# Patient Record
Sex: FEMALE | Race: WHITE | NOT HISPANIC OR LATINO | Employment: OTHER | URBAN - METROPOLITAN AREA
[De-identification: names, ages, dates, MRNs, and addresses within clinical notes are randomized per-mention and may not be internally consistent; named-entity substitution may affect disease eponyms.]

---

## 2017-11-01 ENCOUNTER — ALLSCRIPTS OFFICE VISIT (OUTPATIENT)
Dept: OTHER | Facility: OTHER | Age: 82
End: 2017-11-01

## 2018-01-09 NOTE — PROGRESS NOTES
Chief Complaint  Patient is here today to drop off urine sample per H P , L Mcpherson/LPN      Active Problems    1  Current use of beta blocker (V58 69) (Z79 899)   2  Encounter for lipid screening for cardiovascular disease (V77 91,V81 2) (Z13 220,Z13 6)   3  Hypertension (401 9) (I10)   4  Need for immunization against influenza (V04 81) (Z23)   5  Need for Streptococcus pneumoniae vaccination (V03 82) (Z23)   6  Screening for thyroid disorder (V77 0) (Z13 29)    Current Meds   1  Hydrochlorothiazide 12 5 MG Oral Capsule; TAKE 1 CAPSULE DAILY; Therapy: 26ZIS6691 to (Evaluate:11Nov2016)  Requested for: 24SZX0110; Last   Rx:17Nov2015 Ordered   2  Metoprolol Succinate ER 50 MG Oral Tablet Extended Release 24 Hour; TAKE 1 TABLET   ONCE DAILY; Therapy: 90GXW3749 to (Evaluate:11Nov2016)  Requested for: 07NQD6455; Last   Rx:17Nov2015 Ordered    Allergies    1  Penicillins   2   Sulfa Drugs    Results/Data  Urine Dip Automated- POC 59HWU4525 11:22AM Aziza Sheth     Test Name Result Flag Reference   Color Yellow     Clarity Transparent     Leukocytes negative     Nitrite negative     Blood negative     Bilirubin negative     Urobilinogen normal     Protein negative     Ph 5     Specific Gravity 1 020     Ketone negative     Glucose normal     Color Yellow     Clarity Transparent     Leukocytes negative     Nitrite negative     Blood negative     Bilirubin negative     Urobilinogen normal     Protein negative     Ph 5     Specific Gravity 1 020     Ketone negative     Glucose normal               Plan  Health Maintenance    · Urine Dip Automated- POC; Status:Resulted - Requires Verification,Retrospective By  Protocol Authorization;   Done: 56ZSG4125 11:22AM    Future Appointments    Date/Time Provider Specialty Site   06/06/2016 08:30 AM [facility]     Signatures   Electronically signed by : Remedios Gutierrez MD; May 20 2016 11:36AM EST (Author)

## 2018-01-10 NOTE — PROGRESS NOTES
Assessment   1  Encounter for lipid screening for cardiovascular disease (V77 91,V81 2) (Z13 220,Z13 6)  2  Encounter for preventive health examination (V70 0) (Z00 00)  3  Current use of beta blocker (V58 69) (Z79 899)  4  Hypertension (401 9) (I10)  5  Screening for thyroid disorder (V77 0) (Z13 29)  6  Need for Streptococcus pneumoniae vaccination (V03 82) (Z23)    Plan  Current use of beta blocker, Hypertension, Need for immunization against influenza    · (1) CBC/PLT/DIFF; Status:Active; Requested for:61Osq7563;    · (1) LIPID PANEL, FASTING; Status:Active; Requested for:50Pah0282;    · (1) TSH; Status:Active; Requested for:78Vfo0032;   Current use of beta blocker, Need for immunization against influenza    · (1) VITAMIN D 25-HYDROXY; Status:Active; Requested for:05Ejd8165; Health Maintenance    · (1) URINALYSIS w URINE C/S REFLEX (will reflex a microscopy if leukocytes, occult  blood, or nitrites are not within normal limits); Status:Active; Requested for:23Mvo3582;   Need for immunization against influenza    · (1) COMPREHENSIVE METABOLIC PANEL; Status:Active; Requested for:41Tgk0161;    · Administered: Prevnar 13 Intramuscular Suspension    Discussion/Summary    Healthy diet and exercise  Patient is active and social  Independent in [place]  No recent falls  Monitor BP 3X weekly  Target 140/80  If trending higher, will need additional medication or change  Adequate sleep and rest   Limit salt in diet  Adequate fluids daily  Has had follow up with Dr Luis Alfredo Marie for past hx  breast ca/lumpectomy Mammo in 2015  Has seen Dr Turner Grier, GI who advised no longer need for colonoscopy  No change in bowel or bladder  Is over 80  Impression: Subsequent Annual Wellness Visit  Cardiovascular screening and counseling: the risks and benefits of screening were discussed, screening is current, counseling was given on maintaining a healthy diet and counseling was given on maintaining a healthy weight     Diabetes screening and counseling: the risks and benefits of screening were discussed, counseling was given on maintaining a healthy diet and patient is active and social    Colorectal cancer screening and counseling: the risks and benefits of screening were discussed, screening not indicated and GI advises colon not indicated related to age  Breast cancer screening and counseling: the risks and benefits of screening were discussed and screening is current  Cervical cancer screening and counseling: the risks and benefits of screening were discussed and screening is current  Osteoporosis screening and counseling: the risks and benefits of screening were discussed, screening is current, counseling was given on obtaining adequate amounts of calcium and vitamin D on a daily basis and counseling was given on the importance of regular weightbearing exercise  Abdominal aortic aneurysm screening and counseling: the risks and benefits of screening were discussed and screening not indicated  Glaucoma screening and counseling: the risks and benefits of screening were discussed and screening is current  HIV screening and counseling: screening not indicated  Chief Complaint  Patient is here today for an Annual Wellness Exam  lb/lpn      History of Present Illness  HPI: feeling well  No health concerns  Is active, social and happy  No recent falls  Dances every morning to keep in shape  No hair loss, skin changes, heat or cold intolerance  No dizziness or balance issues  No chest pain, syncope, sob, palpitations, skipped beats  No abdominal or urinary complaints  No change in bowel or bladder  Daily bms  Had colonoscopy 10 years ago but has checked with GI who told patient, no longer needs them  No dark or blood stools  No stress incontinence  Occas  mild joint pain that resolves with movement  +living will  Grab bars in bathroom   Welcome to [employer] and Wellness Visits:  The patient is being seen for the subsequent annual wellness visit  Medicare Screening and Risk Factors   Medicare Screening Tests Risk Questions   Abdominal aortic aneurysm risk assessment: none indicated  Osteoporosis risk assessment: , female gender, over 48years of age, past medical history of fracture(s) and arm  HIV risk assessment: none indicated  Drug and Alcohol Use: The patient has never smoked cigarettes  The patient reports never drinking alcohol  Diet and Physical Activity: Current diet includes well balanced meals  She exercises daily  Exercise: walking, dancing 20 minutes daily minutes per day  Advance Directives: Advance directives: living will  end of life decisions were reviewed with the patient  Co-Managers and Medical Equipment/Suppliers: See Patient Care Team   Reviewed Updated [facility]:   Last Medicare Wellness Visit Information was reviewed, patient interviewed and updates made to the record today  Preventive Quality Program 65 and Older: Falls Risk: The patient fell 0 times in the past 12 months  The fall resulted from falling on stairs and a few years ago, person knocked into patient at top of steps causing fall  The fall resulted in fracture(s) of arm  Associated symptoms:  no pain from the injury  The patient currently has no urinary incontinence symptoms  Date of last glaucoma screen was 2015      Review of Systems    Constitutional: negative  Head and Face: negative  Eyes: negative  ENT: negative  Cardiovascular: negative  Respiratory: negative  Gastrointestinal: negative  Genitourinary: negative  Musculoskeletal: negative  Integumentary and Breasts: mole on upper left chest and back  Neurological: negative  Psychiatric: negative  Endocrine: negative  Hematologic and Lymphatic: negative  Active Problems   1  Current use of beta blocker (V58 69) (Z79 899)  2  Encounter for lipid screening for cardiovascular disease (V77 91,V81 2) (Z13 220,Z13 6)  3   Hypertension (401 9) (I10)  4  Need for immunization against influenza (V04 81) (Z23)  5  Screening for thyroid disorder (V77 0) (Z13 29)    Past Medical History    · History of essential hypertension (V12 59) (Z86 79)   · History of osteoporosis (V13 59) (Z87 39)   · History of Malignant neoplasm of right breast (174 9) (C50 911)    Surgical History    · History of Bladder Surgery   · History of Breast Surgery Lumpectomy    Family History  Mother    · Family history of cerebrovascular accident (V17 1) (Z82 3)   · Family history of hypertension (V17 49) (Z82 49)   · Family history of Ovarian cancer   · Family history of Pancreas carcinoma  Father    · No pertinent family history  Brother    · Family history of Pancreas carcinoma  Maternal Relatives    · Family history of Bipolar depression    The family history was reviewed and updated today  Social History    · Alcohol use (V49 89) (Z78 9)   · wine on occasion   · Caffeine use (V49 89) (F15 90)   · Dental care, regularly   · Drinks coffee   · Never smoked   · Occasional alcohol use   · Tea    Current Meds  1  Hydrochlorothiazide 12 5 MG Oral Capsule; TAKE 1 CAPSULE DAILY; Therapy: 53IZI5272 to (Evaluate:11Nov2016)  Requested for: 44LKT9154; Last   Rx:17Nov2015 Ordered  2  Metoprolol Succinate ER 50 MG Oral Tablet Extended Release 24 Hour; TAKE 1 TABLET   ONCE DAILY; Therapy: 67EYG7520 to (Evaluate:11Nov2016)  Requested for: 95VII7014; Last   Rx:17Nov2015 Ordered    Allergies   1  Penicillins  2   Sulfa Drugs    Immunizations   1 2 3    Influenza  [id number] 94Juq7312 09Zib2252     Vitals  Signs [Data Includes: Current Encounter]    Temperature: 97 5 F, Tympanic  Heart Rate: 76, L Radial  Pulse Quality: Irregular, L Radial  Respiration: 16  Respiration Quality: Normal  Systolic: 774, LUE, Sitting  Diastolic: 94, LUE, Sitting  Height: 5 ft 1 75 in  Weight: 159 lb   BMI Calculated: 29 32  BSA Calculated: 1 73  Patient Refused Height: No  Patient Refused Weight: No  Patient Refused Height: No  Patient Refused Weight: No    Physical Exam    Constitutional   General appearance: No acute distress, well appearing and well nourished  Head and Face   Head and face: Normal     Palpation of the face and sinuses: No sinus tenderness  Eyes   Conjunctiva and lids: No swelling, erythema or discharge  wearing glasses  Pupils and irises: Equal, round, reactive to light  Ophthalmoscopic examination: Normal fundi and optic discs  Ears, Nose, Mouth, and Throat   External inspection of ears and nose: Normal     Otoscopic examination: Tympanic membranes translucent with normal light reflex  Canals patent without erythema  Hearing: Normal     Nasal mucosa, septum, and turbinates: Normal without edema or erythema  Lips, teeth, and gums: Normal, good dentition  Oropharynx: Normal with no erythema, edema, exudate or lesions  Neck   Neck: Supple, symmetric, trachea midline, no masses  Thyroid: Normal, no thyromegaly  Pulmonary   Respiratory effort: No increased work of breathing or signs of respiratory distress  Percussion of chest: Normal     Palpation of chest: Normal     Auscultation of lungs: Clear to auscultation  Cardiovascular   Palpation of heart: Normal PMI, no thrills  Auscultation of heart: Abnormal   The rhythm was regularly irregular  Heart sounds: normal S1, normal S2 and no gallop heard  Carotid pulses: 2+ bilaterally  Abdominal aorta: Normal     Femoral pulses: 2+ bilaterally  Pedal pulses: 2+ bilaterally  Examination of extremities for edema and/or varicosities: Normal     Abdomen   Abdomen: Non-tender, no masses  Liver and spleen: No hepatomegaly or splenomegaly  Examination for hernias: No hernia appreciated  Lymphatic   Palpation of lymph nodes in neck: No lymphadenopathy  Palpation of lymph nodes in axillae: No lymphadenopathy  Palpation of lymph nodes in groin: No lymphadenopathy      Musculoskeletal   Gait and station: Normal  Digits and nails: Normal without clubbing or cyanosis  Joints, bones, and muscles: Normal     Range of motion: Normal     Stability: Normal     Muscle strength/tone: Normal     Skin   Skin and subcutaneous tissue: Abnormal     Examination of the skin for lesions: Abnormal   1 on back, black crust(s)  Alayna Decant macule(s) with symmetrical borders, with smooth borders, left upper chest  Described as not hot  Brown papule(s) with smooth borders  Neurologic   Cranial nerves: Cranial nerves II-XII intact  Reflexes: 2+ and symmetric  Sensation: No sensory loss  Coordination: Normal finger to nose and heel to shin  Psychiatric   Judgment and insight: Normal     Orientation to person, place, and time: Normal     Recent and remote memory: Intact  Mood and affect: Normal        Results/Data  PHQ-2 Adult Depression Screening [id number] 08:52AM User, [employer]     Test Name Result Flag Reference   PHQ-2 Adult Depression Score 0     Over the last two weeks, how often have you been bothered by any of the following problems?   Little interest or pleasure in doing things: Not at all - 0  Feeling down, depressed, or hopeless: Not at all - 0   PHQ-2 Adult Depression Screening Negative       Falls Risk Assessment (Dx V80 09 Screen for Neurologic Disorder) 63GHQ2943 08:52AM User, [employer]     Test Name Result Flag Reference   Falls Risk      No falls in the past year       Future Appointments    Date/Time Provider Specialty Site   06/06/2016 08:30 AM [facility] [address]     Signatures   Electronically signed by : SHELLI Villar; May 17 2016  5:57PM EST                       (Author)    Electronically signed by : Leigh Morgan MD; May 17 2016  7:30PM EST                       (Author)

## 2018-02-20 DIAGNOSIS — I10 ESSENTIAL HYPERTENSION: Primary | ICD-10-CM

## 2018-02-20 RX ORDER — METOPROLOL TARTRATE 50 MG/1
50 TABLET, FILM COATED ORAL DAILY
Qty: 90 TABLET | Refills: 3 | Status: SHIPPED | OUTPATIENT
Start: 2018-02-20 | End: 2018-04-12 | Stop reason: ALTCHOICE

## 2018-02-20 RX ORDER — METOPROLOL SUCCINATE 50 MG/1
50 TABLET, EXTENDED RELEASE ORAL DAILY
Qty: 90 TABLET | Refills: 0 | Status: SHIPPED | OUTPATIENT
Start: 2018-02-20 | End: 2018-06-11 | Stop reason: SDUPTHER

## 2018-02-20 RX ORDER — HYDROCHLOROTHIAZIDE 12.5 MG/1
12.5 CAPSULE, GELATIN COATED ORAL DAILY
Qty: 90 CAPSULE | Refills: 0 | Status: SHIPPED | OUTPATIENT
Start: 2018-02-20 | End: 2018-04-12

## 2018-02-20 RX ORDER — METOPROLOL SUCCINATE 25 MG/1
25 TABLET, EXTENDED RELEASE ORAL DAILY
Qty: 90 TABLET | Refills: 0 | Status: SHIPPED | OUTPATIENT
Start: 2018-02-20 | End: 2018-06-11 | Stop reason: SDUPTHER

## 2018-04-12 ENCOUNTER — OFFICE VISIT (OUTPATIENT)
Dept: FAMILY MEDICINE CLINIC | Facility: CLINIC | Age: 83
End: 2018-04-12
Payer: MEDICARE

## 2018-04-12 VITALS
DIASTOLIC BLOOD PRESSURE: 80 MMHG | TEMPERATURE: 98.7 F | HEIGHT: 63 IN | RESPIRATION RATE: 16 BRPM | WEIGHT: 141 LBS | HEART RATE: 72 BPM | BODY MASS INDEX: 24.98 KG/M2 | SYSTOLIC BLOOD PRESSURE: 134 MMHG | OXYGEN SATURATION: 96 %

## 2018-04-12 DIAGNOSIS — I10 ESSENTIAL HYPERTENSION: Primary | ICD-10-CM

## 2018-04-12 PROCEDURE — 99213 OFFICE O/P EST LOW 20 MIN: CPT | Performed by: FAMILY MEDICINE

## 2018-04-12 RX ORDER — HYDROCHLOROTHIAZIDE 12.5 MG/1
1 CAPSULE, GELATIN COATED ORAL DAILY
COMMUNITY
Start: 2014-10-21 | End: 2018-04-12

## 2018-04-12 RX ORDER — HYDROCHLOROTHIAZIDE 12.5 MG/1
12.5 TABLET ORAL DAILY
Qty: 90 TABLET | Refills: 1
Start: 2018-04-12 | End: 2018-12-05 | Stop reason: SDUPTHER

## 2018-04-12 NOTE — ASSESSMENT & PLAN NOTE
STABLE  DENIES ANY CP, SOB, PALPITATIONS, OR HEADACHE  NOTES NO WATER RETENTION  COMPLIANT WITH MEDICATION  NO CONCERNS    - CONTINUE CURRENT TREATMENT PLAN  - MONITOR DIETARY SODIUM INTAKE  - ENCOURAGE PHYSICAL ACTIVITY  - RV 6 MONTHS

## 2018-04-12 NOTE — PATIENT INSTRUCTIONS
PLENTY OF FLUIDS  MONITOR SODIUM ( SALT ) IN YOUR DIET  ENCOURAGE AN INCREASE IN EXERCISE AND ACTIVITY  MEDICATION AS DIRECTED  REVISIT IN 6 MONTHS, SOONER PRN

## 2018-04-12 NOTE — PROGRESS NOTES
Assessment/Plan:    Problem List Items Addressed This Visit     Hypertension - Primary     STABLE  DENIES ANY CP, SOB, PALPITATIONS, OR HEADACHE  NOTES NO WATER RETENTION  COMPLIANT WITH MEDICATION  NO CONCERNS    - CONTINUE CURRENT TREATMENT PLAN  - MONITOR DIETARY SODIUM INTAKE  - ENCOURAGE PHYSICAL ACTIVITY  - RV 6 MONTHS         Relevant Medications    hydrochlorothiazide (HYDRODIURIL) 12 5 mg tablet          Patient Instructions   PLENTY OF FLUIDS  MONITOR SODIUM ( SALT ) IN YOUR DIET  ENCOURAGE AN INCREASE IN EXERCISE AND ACTIVITY  MEDICATION AS DIRECTED  REVISIT IN 6 MONTHS, SOONER PRN      Return in about 6 months (around 10/12/2018) for Recheck AWV  Subjective:      Patient ID: Bridgre Fam is a 80 y o  female  Chief Complaint   Patient presents with    medicatoin check       REVIEW OF MEDICAL ISSUES        The following portions of the patient's history were reviewed and updated as appropriate: allergies, current medications, past family history, past medical history, past social history, past surgical history and problem list     Review of Systems   Constitutional: Negative for chills, fatigue and fever  HENT: Negative for congestion, ear discharge, ear pain, mouth sores, postnasal drip, sore throat and trouble swallowing  Eyes: Negative for pain, discharge and visual disturbance  Respiratory: Negative for cough, shortness of breath and wheezing  Cardiovascular: Negative for chest pain, palpitations and leg swelling  Gastrointestinal: Negative for abdominal distention, abdominal pain, blood in stool, diarrhea and nausea  Endocrine: Negative for polydipsia, polyphagia and polyuria  Genitourinary: Negative for dysuria, frequency, hematuria and urgency  Musculoskeletal: Negative for arthralgias, gait problem and joint swelling  Skin: Negative for pallor and rash  Neurological: Negative for dizziness, syncope, speech difficulty, weakness, light-headedness, numbness and headaches  Hematological: Negative for adenopathy  Psychiatric/Behavioral: Negative for behavioral problems, confusion and sleep disturbance  The patient is not nervous/anxious  Current Outpatient Prescriptions   Medication Sig Dispense Refill    metoprolol succinate (TOPROL-XL) 25 mg 24 hr tablet Take 1 tablet (25 mg total) by mouth daily 90 tablet 0    metoprolol succinate (TOPROL-XL) 50 mg 24 hr tablet Take 1 tablet (50 mg total) by mouth daily 90 tablet 0    hydrochlorothiazide (HYDRODIURIL) 12 5 mg tablet Take 1 tablet (12 5 mg total) by mouth daily 90 tablet 1     No current facility-administered medications for this visit  Objective:    /80 (BP Location: Left arm, Patient Position: Sitting, Cuff Size: Standard)   Pulse 72   Temp 98 7 °F (37 1 °C) (Temporal)   Resp 16   Ht 5' 3" (1 6 m)   Wt 64 kg (141 lb)   SpO2 96%   BMI 24 98 kg/m²        Physical Exam   Constitutional: She is oriented to person, place, and time  She appears well-developed and well-nourished  HENT:   Head: Normocephalic and atraumatic  Eyes: Conjunctivae and EOM are normal  Pupils are equal, round, and reactive to light  Right eye exhibits no discharge  Left eye exhibits no discharge  Neck: Normal range of motion  Neck supple  No thyromegaly present  Cardiovascular: Normal rate, regular rhythm and normal heart sounds  No murmur heard  Pulmonary/Chest: Effort normal and breath sounds normal  No respiratory distress  She has no wheezes  She has no rales  Abdominal: Soft  Bowel sounds are normal  There is no tenderness  Musculoskeletal: Normal range of motion  She exhibits no edema or tenderness  Lymphadenopathy:     She has no cervical adenopathy  Neurological: She is alert and oriented to person, place, and time  Skin: Skin is warm and dry  No rash noted  No erythema  Psychiatric: She has a normal mood and affect   Her behavior is normal  Judgment and thought content normal               Annie Kaiser Anthony Johnston MD

## 2018-06-11 DIAGNOSIS — I10 ESSENTIAL HYPERTENSION: ICD-10-CM

## 2018-06-11 RX ORDER — METOPROLOL SUCCINATE 50 MG/1
50 TABLET, EXTENDED RELEASE ORAL DAILY
Qty: 90 TABLET | Refills: 1 | Status: SHIPPED | OUTPATIENT
Start: 2018-06-11 | End: 2019-01-15 | Stop reason: SDUPTHER

## 2018-06-11 RX ORDER — METOPROLOL SUCCINATE 25 MG/1
25 TABLET, EXTENDED RELEASE ORAL DAILY
Qty: 90 TABLET | Refills: 1 | Status: SHIPPED | OUTPATIENT
Start: 2018-06-11 | End: 2019-01-15 | Stop reason: SDUPTHER

## 2018-10-11 ENCOUNTER — TELEPHONE (OUTPATIENT)
Dept: FAMILY MEDICINE CLINIC | Facility: CLINIC | Age: 83
End: 2018-10-11

## 2018-10-11 ENCOUNTER — OFFICE VISIT (OUTPATIENT)
Dept: FAMILY MEDICINE CLINIC | Facility: CLINIC | Age: 83
End: 2018-10-11
Payer: MEDICARE

## 2018-10-11 VITALS
TEMPERATURE: 97.9 F | HEIGHT: 63 IN | RESPIRATION RATE: 16 BRPM | BODY MASS INDEX: 24.27 KG/M2 | OXYGEN SATURATION: 97 % | WEIGHT: 137 LBS | SYSTOLIC BLOOD PRESSURE: 138 MMHG | HEART RATE: 72 BPM | DIASTOLIC BLOOD PRESSURE: 70 MMHG

## 2018-10-11 DIAGNOSIS — Z23 NEED FOR INFLUENZA VACCINATION: ICD-10-CM

## 2018-10-11 DIAGNOSIS — Z13.220 SCREENING FOR HYPERLIPIDEMIA: ICD-10-CM

## 2018-10-11 DIAGNOSIS — I10 ESSENTIAL HYPERTENSION: Primary | ICD-10-CM

## 2018-10-11 DIAGNOSIS — Z23 NEED FOR PNEUMOCOCCAL VACCINATION: ICD-10-CM

## 2018-10-11 DIAGNOSIS — Z13.29 SCREENING FOR HYPOTHYROIDISM: ICD-10-CM

## 2018-10-11 LAB
ALBUMIN SERPL BCP-MCNC: 3.8 G/DL (ref 3.5–5)
ALP SERPL-CCNC: 59 U/L (ref 46–116)
ALT SERPL W P-5'-P-CCNC: 20 U/L (ref 12–78)
ANION GAP SERPL CALCULATED.3IONS-SCNC: 4 MMOL/L (ref 4–13)
AST SERPL W P-5'-P-CCNC: 18 U/L (ref 5–45)
BILIRUB SERPL-MCNC: 0.74 MG/DL (ref 0.2–1)
BUN SERPL-MCNC: 23 MG/DL (ref 5–25)
CALCIUM SERPL-MCNC: 9.6 MG/DL (ref 8.3–10.1)
CHLORIDE SERPL-SCNC: 104 MMOL/L (ref 100–108)
CHOLEST SERPL-MCNC: 177 MG/DL (ref 50–200)
CO2 SERPL-SCNC: 30 MMOL/L (ref 21–32)
CREAT SERPL-MCNC: 0.86 MG/DL (ref 0.6–1.3)
ECG INTERP DURING EX: ABNORMAL MS
GFR SERPL CREATININE-BSD FRML MDRD: 61 ML/MIN/1.73SQ M
GLUCOSE SERPL-MCNC: 98 MG/DL (ref 65–140)
HDLC SERPL-MCNC: 61 MG/DL (ref 40–60)
LDLC SERPL CALC-MCNC: 95 MG/DL (ref 0–100)
NONHDLC SERPL-MCNC: 116 MG/DL
POTASSIUM SERPL-SCNC: 4.4 MMOL/L (ref 3.5–5.3)
PROT SERPL-MCNC: 7.5 G/DL (ref 6.4–8.2)
SL AMB  POCT GLUCOSE, UA: 0
SL AMB LEUKOCYTE ESTERASE,UA: 0
SL AMB POCT BILIRUBIN,UA: 0
SL AMB POCT BLOOD,UA: 0
SL AMB POCT CLARITY,UA: CLEAR
SL AMB POCT COLOR,UA: YELLOW
SL AMB POCT KETONES,UA: 0
SL AMB POCT NITRITE,UA: 0
SL AMB POCT PH,UA: 5
SL AMB POCT SPECIFIC GRAVITY,UA: 1.01
SL AMB POCT URINE PROTEIN: 0
SL AMB POCT UROBILINOGEN: 0
SODIUM SERPL-SCNC: 138 MMOL/L (ref 136–145)
TRIGL SERPL-MCNC: 107 MG/DL
TSH SERPL DL<=0.05 MIU/L-ACNC: 1.8 UIU/ML (ref 0.36–3.74)

## 2018-10-11 PROCEDURE — 84443 ASSAY THYROID STIM HORMONE: CPT | Performed by: FAMILY MEDICINE

## 2018-10-11 PROCEDURE — 80053 COMPREHEN METABOLIC PANEL: CPT | Performed by: FAMILY MEDICINE

## 2018-10-11 PROCEDURE — G0008 ADMIN INFLUENZA VIRUS VAC: HCPCS

## 2018-10-11 PROCEDURE — 36415 COLL VENOUS BLD VENIPUNCTURE: CPT | Performed by: FAMILY MEDICINE

## 2018-10-11 PROCEDURE — G0439 PPPS, SUBSEQ VISIT: HCPCS | Performed by: FAMILY MEDICINE

## 2018-10-11 PROCEDURE — 99214 OFFICE O/P EST MOD 30 MIN: CPT | Performed by: FAMILY MEDICINE

## 2018-10-11 PROCEDURE — G0009 ADMIN PNEUMOCOCCAL VACCINE: HCPCS

## 2018-10-11 PROCEDURE — 93000 ELECTROCARDIOGRAM COMPLETE: CPT | Performed by: FAMILY MEDICINE

## 2018-10-11 PROCEDURE — 81003 URINALYSIS AUTO W/O SCOPE: CPT | Performed by: FAMILY MEDICINE

## 2018-10-11 PROCEDURE — 80061 LIPID PANEL: CPT | Performed by: FAMILY MEDICINE

## 2018-10-11 PROCEDURE — 90732 PPSV23 VACC 2 YRS+ SUBQ/IM: CPT

## 2018-10-11 PROCEDURE — 90662 IIV NO PRSV INCREASED AG IM: CPT

## 2018-10-11 NOTE — TELEPHONE ENCOUNTER
States that you were going to call her  Do not use her land line  It is not working    Call [phone]

## 2018-10-11 NOTE — PROGRESS NOTES
Assessment and Plan:    Problem List Items Addressed This Visit     Hypertension - Primary     STABLE  DENIES ANY CP, SOB, PALPITATIONS, OR HEADACHE  NOTES NO WATER RETENTION  COMPLIANT WITH MEDICATION  NO CONCERNS    - CONTINUE CURRENT TREATMENT PLAN  - MONITOR DIETARY SODIUM INTAKE  - ENCOURAGE PHYSICAL ACTIVITY  - RV 3 MONTHS           Relevant Orders    POCT ECG (Completed)    POCT urine dip auto non-scope (Completed)    Comprehensive metabolic panel    Lipid panel    TSH, 3rd generation    Screening for hyperlipidemia    Relevant Orders    Lipid panel    TSH, 3rd generation    Screening for hypothyroidism    Relevant Orders    Lipid panel    TSH, 3rd generation    Need for pneumococcal vaccination    Relevant Orders    PNEUMOCOCCAL POLYSACCHARIDE VACCINE 23-VALENT =>1YO SQ IM (Completed)    Need for influenza vaccination    Relevant Orders    influenza vaccine, 5461-4373, high-dose, PF 0 5 mL, for patients 65 yr+ (FLUZONE HIGH-DOSE) (Completed)        Health Maintenance Due   Topic Date Due    Depression Screening PHQ  08/20/1930    DTaP,Tdap,and Td Vaccines (1 - Tdap) 08/20/1951    Fall Risk  08/20/1995    Urinary Incontinence Screening  08/20/1995    Pneumococcal PPSV23/PCV13 65+ Years / High and Highest Risk (2 of 2 - PPSV23) 07/12/2016    INFLUENZA VACCINE  07/01/2018         HPI:  Brissa Savage is a 80 y o  female here for her Subsequent Wellness Visit      Patient Active Problem List   Diagnosis    Hypertension    Screening for hyperlipidemia    Screening for hypothyroidism    Need for pneumococcal vaccination    Need for influenza vaccination     Past Medical History:   Diagnosis Date    Essential hypertension     LAST ASSESSED: 4/27/15    Malignant neoplasm of breast ([facility] [address] )     RIGHT; LAST ASSESSED: 4/27/15    Osteoporosis     LAST ASSESSED: 4/27/15     Past Surgical History:   Procedure Laterality Date    BLADDER SURGERY      BREAST LUMPECTOMY      LAST ASSESSED: 4/27/15     Family History Problem Relation Age of Onset    Stroke Mother         CEREBROVASCULAR ACCIDENT    Hypertension Mother     Ovarian cancer Mother     Pancreatic cancer Mother         PANCREAS CARCINOMA    Pancreatic cancer Brother         PANCREAS CARCINOMA    Bipolar disorder Other     Substance Abuse Neg Hx     Mental illness Neg Hx      History   Smoking Status    Never Smoker   Smokeless Tobacco    Never Used     History   Alcohol Use No     Comment: ALCOHOL USE: WINE ON OCCASION, OCCASIONAL ALCOHOL USE AS PER [facility]      History   Drug Use No       Current Outpatient Prescriptions   Medication Sig Dispense Refill    metoprolol succinate (TOPROL-XL) 25 mg 24 hr tablet TAKE 1 TABLET (25 MG TOTAL) BY MOUTH DAILY 90 tablet 1    metoprolol succinate (TOPROL-XL) 50 mg 24 hr tablet TAKE 1 TABLET (50 MG TOTAL) BY MOUTH DAILY 90 tablet 1    hydrochlorothiazide (HYDRODIURIL) 12 5 mg tablet Take 1 tablet (12 5 mg total) by mouth daily (Patient not taking: Reported on 10/11/2018 ) 90 tablet 1     No current facility-administered medications for this visit  Allergies   Allergen Reactions    Penicillins     Sulfa Antibiotics      Immunization History   Administered Date(s) Administered    Influenza Quadrivalent Preservative Free 3 years and older IM 10/22/2015    Influenza Quadrivalent, 6-35 Months IM 10/22/2015    Influenza Split High Dose Preservative Free IM 11/01/2017    Influenza TIV (IM) 11/21/2014    Influenza, high dose seasonal 0 5 mL 10/11/2018    Pneumococcal Conjugate 13-Valent 05/17/2016    Pneumococcal Polysaccharide PPV23 10/11/2018       Patient Care Team:  Casper Negron MD as PCP - General    Medicare Screening Tests and Risk Assessments:  Kathy Carr is here for her Subsequent Wellness visit  Last Medicare Wellness visit information reviewed, patient interviewed and updates made to the record today  Health Risk Assessment:  Patient rates overall health as very good   Patient feels that their physical health rating is Same  Eyesight was rated as Slightly worse  Hearing was rated as Same  Patient feels that their emotional and mental health rating is Same  Pain experienced by patient in the last 7 days has been None  Patient states that she has experienced no weight loss or gain in last 6 months  Emotional/Mental Health:  Patient has been feeling nervous/anxious  PHQ-9 Depression Screening:    Frequency of the following problems over the past two weeks:      1  Little interest or pleasure in doing things: 0 - not at all      2  Feeling down, depressed, or hopeless: 0 - not at all  PHQ-2 Score: 0          Broken Bones/Falls: Fall Risk Assessment:    In the past year, patient has experienced: No history of falling in past year          Bladder/Bowel:  Patient has not leaked urine accidently in the last six months  Patient reports no loss of bowel control  Immunizations:  Patient has had a flu vaccination within the last year  Patient has received a pneumonia shot  Patient has not received a shingles shot  Home Safety:  Patient does not have trouble with stairs inside or outside of their home  Patient currently reports that there are no safety hazards present in home, working smoke alarms, working carbon monoxide detectors  Preventative Screenings:   Breast cancer screening performed, 11/15/2017  no colon cancer screen completed, no cholesterol screen completed, no glaucoma eye exam completed    Nutrition:  Current diet: Regular with servings of the following:    Medications:  Patient is not currently taking any over-the-counter supplements  Patient is able to manage medications  Lifestyle Choices:  Patient reports no tobacco use  Patient has not smoked or used tobacco in the past   Patient reports no alcohol use  Patient drives a vehicle  Patient wears seat belt      Current level of exercise of physical activity described by patient as: walk everyday,  weather permitting  Activities of Daily Living:  Can get out of bed by his or her self, able to dress self, able to make own meals, able to do own shopping, able to bathe self, can do own laundry/housekeeping, can manage own money, pay bills and track expenses    Previous Hospitalizations:  No hospitalization or ED visit in past 12 months        Advanced Directives:  Patient has decided on a power of   Patient has spoken to designated power of   Patient has not completed advanced directive  Preventative Screening/Counseling:      Cardiovascular:      General: Screening Current      Counseling: Healthy Weight and Healthy Diet     Due for Labs/Analytes/Optional EKG: Cholesterol          Diabetes:      General: Screening Current      Counseling: Healthy Diet and Healthy Weight      Due for labs: Blood Glucose          Colorectal Cancer:      General: Screening Current          Breast Cancer:      General: Screening Current          Cervical Cancer:      General: Screening Not Indicated          Osteoporosis:      General: Screening Current      Counseling: Calcium and Vitamin D Intake and Regular Weightbearing Exercise          AAA:      General: Screening Not Indicated          Glaucoma:      General: Risks and Benefits Discussed          HIV:      General: Screening Not Indicated          Hepatitis C:      General: Screening Not Indicated        Advanced Directives:   Patient has no living will for healthcare, does not have durable POA for healthcare, patient does not have an advanced directive  Information on ACP and/or AD provided  5 wishes given  No end of life assessment reviewed with patient

## 2018-10-11 NOTE — PATIENT INSTRUCTIONS
DISCUSSED HEALTH ISSUES  HEALTHY DIET AND EXERCISE  BW WILL BE OBTAINED  RECOMMEND CALCIUM 2524-3803 MG DAILY  VITAMIN D3  1000 IU DAILY  RV IN 1 YEAR FOR ANNUAL EXAM, SOONER IF NEEDED

## 2018-10-14 NOTE — PROGRESS NOTES
Assessment/Plan:    Problem List Items Addressed This Visit        Cardiovascular and Mediastinum    Hypertension - Primary     STABLE  DENIES ANY CP, SOB, PALPITATIONS, OR HEADACHE  NOTES NO WATER RETENTION  COMPLIANT WITH MEDICATION  NO CONCERNS    - CONTINUE CURRENT TREATMENT PLAN  - MONITOR DIETARY SODIUM INTAKE  - ENCOURAGE PHYSICAL ACTIVITY  - RV 3 MONTHS           Relevant Orders    POCT ECG (Completed)    POCT urine dip auto non-scope (Completed)    Comprehensive metabolic panel (Completed)    Lipid panel (Completed)    TSH, 3rd generation (Completed)       Other    Screening for hyperlipidemia    Relevant Orders    Lipid panel (Completed)    TSH, 3rd generation (Completed)    Screening for hypothyroidism    Relevant Orders    Lipid panel (Completed)    TSH, 3rd generation (Completed)    Need for pneumococcal vaccination    Relevant Orders    PNEUMOCOCCAL POLYSACCHARIDE VACCINE 23-VALENT =>1YO SQ IM (Completed)    Need for influenza vaccination    Relevant Orders    influenza vaccine, 3360-1558, high-dose, PF 0 5 mL, for patients 65 yr+ (FLUZONE HIGH-DOSE) (Completed)          Patient Instructions   DISCUSSED HEALTH ISSUES  HEALTHY DIET AND EXERCISE  BW WILL BE OBTAINED  RECOMMEND CALCIUM 4902-0430 MG DAILY  VITAMIN D3  1000 IU DAILY  RV IN 1 YEAR FOR ANNUAL EXAM, SOONER IF NEEDED        Return in about 6 months (around 4/11/2019) for Recheck BP  Subjective:      Patient ID: Stiven Yap is a 80 y o  female      Chief Complaint   Patient presents with    Medicare Wellness Visit    Flu Vaccine       PATIENT RETURNS FOR ANNUAL WELLNESS EXAM AND ANNUAL EVALUATION OF PATIENT'S MEDICAL ISSUES    INDIVIDUAL MEDICAL ISSUES WITH THEIR CURRENT STATUS, ASSESSMENT AND PLANS ARE LISTED ABOVE            The following portions of the patient's history were reviewed and updated as appropriate: allergies, current medications, past family history, past medical history, past social history, past surgical history and problem list     Review of Systems   Constitutional: Negative for chills, fatigue and fever  HENT: Negative for congestion, ear discharge, ear pain, mouth sores, postnasal drip, sore throat and trouble swallowing  Eyes: Negative for pain, discharge and visual disturbance  Respiratory: Negative for cough, shortness of breath and wheezing  Cardiovascular: Negative for chest pain, palpitations and leg swelling  Gastrointestinal: Negative for abdominal distention, abdominal pain, blood in stool, diarrhea and nausea  Endocrine: Negative for polydipsia, polyphagia and polyuria  Genitourinary: Negative for dysuria, frequency, hematuria and urgency  Musculoskeletal: Negative for arthralgias, gait problem and joint swelling  Skin: Negative for pallor and rash  Neurological: Negative for dizziness, syncope, speech difficulty, weakness, light-headedness, numbness and headaches  Hematological: Negative for adenopathy  Psychiatric/Behavioral: Negative for behavioral problems, confusion and sleep disturbance  The patient is not nervous/anxious  Current Outpatient Prescriptions   Medication Sig Dispense Refill    metoprolol succinate (TOPROL-XL) 25 mg 24 hr tablet TAKE 1 TABLET (25 MG TOTAL) BY MOUTH DAILY 90 tablet 1    metoprolol succinate (TOPROL-XL) 50 mg 24 hr tablet TAKE 1 TABLET (50 MG TOTAL) BY MOUTH DAILY 90 tablet 1    hydrochlorothiazide (HYDRODIURIL) 12 5 mg tablet Take 1 tablet (12 5 mg total) by mouth daily (Patient not taking: Reported on 10/11/2018 ) 90 tablet 1     No current facility-administered medications for this visit  Objective:    /70   Pulse 72   Temp 97 9 °F (36 6 °C) (Temporal)   Resp 16   Ht 5' 2 5" (1 588 m)   Wt 62 1 kg (137 lb)   SpO2 97%   BMI 24 66 kg/m²        Physical Exam   Constitutional: She is oriented to person, place, and time  She appears well-developed and well-nourished  HENT:   Head: Normocephalic and atraumatic     Eyes: Pupils are equal, round, and reactive to light  Conjunctivae and EOM are normal  Right eye exhibits no discharge  Left eye exhibits no discharge  Neck: Normal range of motion  Neck supple  No thyromegaly present  Cardiovascular: Normal rate, regular rhythm and normal heart sounds  No murmur heard  Pulmonary/Chest: Effort normal and breath sounds normal  No respiratory distress  She has no wheezes  She has no rales  Abdominal: Soft  Bowel sounds are normal  There is no tenderness  Musculoskeletal: Normal range of motion  She exhibits no edema or tenderness  Lymphadenopathy:     She has no cervical adenopathy  Neurological: She is alert and oriented to person, place, and time  Skin: Skin is warm and dry  No rash noted  No erythema  Psychiatric: She has a normal mood and affect   Her behavior is normal  Judgment and thought content normal               Mehreen Blake MD

## 2018-12-05 ENCOUNTER — OFFICE VISIT (OUTPATIENT)
Dept: FAMILY MEDICINE CLINIC | Facility: CLINIC | Age: 83
End: 2018-12-05
Payer: MEDICARE

## 2018-12-05 VITALS
DIASTOLIC BLOOD PRESSURE: 90 MMHG | OXYGEN SATURATION: 98 % | HEART RATE: 88 BPM | HEIGHT: 63 IN | TEMPERATURE: 97.9 F | BODY MASS INDEX: 24.8 KG/M2 | SYSTOLIC BLOOD PRESSURE: 142 MMHG | WEIGHT: 140 LBS | RESPIRATION RATE: 12 BRPM

## 2018-12-05 DIAGNOSIS — G89.29 CHRONIC RIGHT SHOULDER PAIN: ICD-10-CM

## 2018-12-05 DIAGNOSIS — Z23 NEED FOR SHINGLES VACCINE: ICD-10-CM

## 2018-12-05 DIAGNOSIS — M25.511 CHRONIC RIGHT SHOULDER PAIN: ICD-10-CM

## 2018-12-05 DIAGNOSIS — I10 ESSENTIAL HYPERTENSION: Primary | ICD-10-CM

## 2018-12-05 PROBLEM — Z13.220 SCREENING FOR HYPERLIPIDEMIA: Status: RESOLVED | Noted: 2018-10-11 | Resolved: 2018-12-05

## 2018-12-05 PROBLEM — Z13.29 SCREENING FOR HYPOTHYROIDISM: Status: RESOLVED | Noted: 2018-10-11 | Resolved: 2018-12-05

## 2018-12-05 PROCEDURE — 99214 OFFICE O/P EST MOD 30 MIN: CPT | Performed by: FAMILY MEDICINE

## 2018-12-05 RX ORDER — HYDROCHLOROTHIAZIDE 12.5 MG/1
12.5 TABLET ORAL DAILY
Qty: 90 TABLET | Refills: 1
Start: 2018-12-05 | End: 2019-03-05 | Stop reason: SDUPTHER

## 2018-12-05 NOTE — PATIENT INSTRUCTIONS
CONTINUE CURRENT TREATMENT PLAN  MONITOR DIETARY SODIUM, CHOL INTAKE  ENCOURAGE PHYSICAL ACTIVITY    CONSIDER ORTHOPEDIC REFERRAL WITH DR TUCKER FOR R SHOULDER        RV 4  M, SOONER PRN

## 2018-12-05 NOTE — PROGRESS NOTES
Assessment/Plan:    Problem List Items Addressed This Visit        Cardiovascular and Mediastinum    Hypertension - Primary     STABLE  DENIES ANY CP, SOB, PALPITATIONS, OR HEADACHE  NOTES NO WATER RETENTION  COMPLIANT WITH MEDICATION  NO CONCERNS    - CONTINUE CURRENT TREATMENT PLAN  - MONITOR DIETARY SODIUM INTAKE  - ENCOURAGE PHYSICAL ACTIVITY  - RV 4 MONTHS           Relevant Medications    hydrochlorothiazide (HYDRODIURIL) 12 5 mg tablet       Other    Chronic right shoulder pain     LONGSTANDING ISSUE  MILD DISCOMFORT  MARKED DECREASE ROM  HAD AN ACCIDENT  ( FALL ) SEVERAL YEARS AGO    - ORTHO REFERRAL WITH DR TUCKER         Relevant Orders    Ambulatory referral to Orthopedic Surgery    Need for shingles vaccine     DISCUSSED ISSUES  RECOMMEND          Relevant Orders    Zoster Vaccine Recombinant IM          Patient Instructions   CONTINUE CURRENT TREATMENT PLAN  MONITOR DIETARY SODIUM, CHOL INTAKE  ENCOURAGE PHYSICAL ACTIVITY    CONSIDER ORTHOPEDIC REFERRAL WITH DR TUCKER FOR R SHOULDER        RV 4  M, SOONER PRN          Return in about 4 months (around 4/5/2019) for Recheck  Subjective:      Patient ID: Ginna Garcia is a 80 y o  female  Chief Complaint   Patient presents with    med check       PATIENT RETURNS FOR ROUTINE EVALUATION OF PATIENT'S MEDICAL ISSUES    INDIVIDUAL MEDICAL ISSUES WITH THEIR CURRENT STATUS, ASSESSMENT AND PLANS ARE LISTED ABOVE            The following portions of the patient's history were reviewed and updated as appropriate: allergies, current medications, past family history, past medical history, past social history, past surgical history and problem list     Review of Systems   Constitutional: Negative for chills, fatigue and fever  HENT: Negative for congestion, ear discharge, ear pain, mouth sores, postnasal drip, sore throat and trouble swallowing  Eyes: Negative for pain, discharge and visual disturbance     Respiratory: Negative for cough, shortness of breath and wheezing  Cardiovascular: Negative for chest pain, palpitations and leg swelling  Gastrointestinal: Negative for abdominal distention, abdominal pain, blood in stool, diarrhea and nausea  Endocrine: Negative for polydipsia, polyphagia and polyuria  Genitourinary: Negative for dysuria, frequency, hematuria and urgency  Musculoskeletal: Positive for arthralgias  Negative for gait problem and joint swelling  Skin: Negative for pallor and rash  Neurological: Negative for dizziness, syncope, speech difficulty, weakness, light-headedness, numbness and headaches  Hematological: Negative for adenopathy  Psychiatric/Behavioral: Negative for behavioral problems, confusion and sleep disturbance  The patient is not nervous/anxious  Current Outpatient Prescriptions   Medication Sig Dispense Refill    metoprolol succinate (TOPROL-XL) 25 mg 24 hr tablet TAKE 1 TABLET (25 MG TOTAL) BY MOUTH DAILY 90 tablet 1    metoprolol succinate (TOPROL-XL) 50 mg 24 hr tablet TAKE 1 TABLET (50 MG TOTAL) BY MOUTH DAILY 90 tablet 1    hydrochlorothiazide (HYDRODIURIL) 12 5 mg tablet Take 1 tablet (12 5 mg total) by mouth daily 90 tablet 1     No current facility-administered medications for this visit  Objective:    /90 (BP Location: Left arm, Patient Position: Sitting, Cuff Size: Adult)   Pulse 88   Temp 97 9 °F (36 6 °C) (Temporal)   Resp 12   Ht 5' 2 5" (1 588 m)   Wt 63 5 kg (140 lb)   SpO2 98%   BMI 25 20 kg/m²        Physical Exam   Constitutional: She is oriented to person, place, and time  She appears well-developed and well-nourished  HENT:   Head: Normocephalic and atraumatic  Eyes: Pupils are equal, round, and reactive to light  Conjunctivae and EOM are normal  Right eye exhibits no discharge  Left eye exhibits no discharge  Neck: Normal range of motion  Neck supple  No thyromegaly present  Cardiovascular: Normal rate, regular rhythm and normal heart sounds      No murmur heard   Pulmonary/Chest: Effort normal and breath sounds normal  No respiratory distress  She has no wheezes  She has no rales  Abdominal: Soft  Bowel sounds are normal  There is no tenderness  Musculoskeletal: Normal range of motion  She exhibits tenderness  She exhibits no edema  MODERATE DJD CHANGES    R SHOULDER  VERY MILD DISCOMFORT ON PALPATION AND MOVEMENT  PASSIVE ROM DECREASED WITH SOME CREPITUS  ACTIVE ABDUCTION MARKEDLY DECREASED  ? IMPINGEMENT   Lymphadenopathy:     She has no cervical adenopathy  Neurological: She is alert and oriented to person, place, and time  Skin: Skin is warm and dry  No rash noted  No erythema  Psychiatric: She has a normal mood and affect   Her behavior is normal  Judgment and thought content normal               Leigh Morgan MD

## 2018-12-05 NOTE — ASSESSMENT & PLAN NOTE
LONGSTANDING ISSUE  MILD DISCOMFORT  MARKED DECREASE ROM  HAD AN ACCIDENT  ( FALL ) SEVERAL YEARS AGO    - ORTHO REFERRAL WITH DR Michael Ha

## 2018-12-05 NOTE — ASSESSMENT & PLAN NOTE
STABLE  DENIES ANY CP, SOB, PALPITATIONS, OR HEADACHE  NOTES NO WATER RETENTION  COMPLIANT WITH MEDICATION  NO CONCERNS    - CONTINUE CURRENT TREATMENT PLAN  - MONITOR DIETARY SODIUM INTAKE  - ENCOURAGE PHYSICAL ACTIVITY  - RV 4 MONTHS

## 2019-01-15 DIAGNOSIS — I10 ESSENTIAL HYPERTENSION: ICD-10-CM

## 2019-01-15 RX ORDER — METOPROLOL SUCCINATE 25 MG/1
25 TABLET, EXTENDED RELEASE ORAL DAILY
Qty: 90 TABLET | Refills: 1 | Status: SHIPPED | OUTPATIENT
Start: 2019-01-15 | End: 2019-04-11 | Stop reason: SDUPTHER

## 2019-01-15 RX ORDER — METOPROLOL SUCCINATE 50 MG/1
50 TABLET, EXTENDED RELEASE ORAL DAILY
Qty: 90 TABLET | Refills: 1 | Status: SHIPPED | OUTPATIENT
Start: 2019-01-15 | End: 2019-04-11 | Stop reason: SDUPTHER

## 2019-03-05 DIAGNOSIS — I10 ESSENTIAL HYPERTENSION: ICD-10-CM

## 2019-03-05 RX ORDER — HYDROCHLOROTHIAZIDE 12.5 MG/1
12.5 CAPSULE, GELATIN COATED ORAL DAILY
Qty: 90 CAPSULE | Refills: 0 | Status: SHIPPED | OUTPATIENT
Start: 2019-03-05 | End: 2019-04-11 | Stop reason: SDUPTHER

## 2019-04-11 ENCOUNTER — OFFICE VISIT (OUTPATIENT)
Dept: FAMILY MEDICINE CLINIC | Facility: CLINIC | Age: 84
End: 2019-04-11
Payer: MEDICARE

## 2019-04-11 VITALS
DIASTOLIC BLOOD PRESSURE: 80 MMHG | WEIGHT: 141.4 LBS | SYSTOLIC BLOOD PRESSURE: 136 MMHG | HEIGHT: 63 IN | TEMPERATURE: 97.2 F | RESPIRATION RATE: 12 BRPM | BODY MASS INDEX: 25.05 KG/M2 | OXYGEN SATURATION: 98 % | HEART RATE: 90 BPM

## 2019-04-11 DIAGNOSIS — M25.511 CHRONIC RIGHT SHOULDER PAIN: ICD-10-CM

## 2019-04-11 DIAGNOSIS — L84 CORN: ICD-10-CM

## 2019-04-11 DIAGNOSIS — I10 ESSENTIAL HYPERTENSION: Primary | ICD-10-CM

## 2019-04-11 DIAGNOSIS — G89.29 CHRONIC RIGHT SHOULDER PAIN: ICD-10-CM

## 2019-04-11 PROCEDURE — 99214 OFFICE O/P EST MOD 30 MIN: CPT | Performed by: FAMILY MEDICINE

## 2019-04-11 RX ORDER — METOPROLOL SUCCINATE 25 MG/1
25 TABLET, EXTENDED RELEASE ORAL DAILY
Qty: 90 TABLET | Refills: 1
Start: 2019-04-11 | End: 2019-11-18 | Stop reason: SDUPTHER

## 2019-04-11 RX ORDER — HYDROCHLOROTHIAZIDE 12.5 MG/1
12.5 CAPSULE, GELATIN COATED ORAL DAILY
Qty: 90 CAPSULE | Refills: 1
Start: 2019-04-11 | End: 2020-01-02 | Stop reason: SDUPTHER

## 2019-04-11 RX ORDER — METOPROLOL SUCCINATE 50 MG/1
50 TABLET, EXTENDED RELEASE ORAL DAILY
Qty: 90 TABLET | Refills: 1
Start: 2019-04-11 | End: 2019-11-18 | Stop reason: SDUPTHER

## 2019-06-03 ENCOUNTER — OFFICE VISIT (OUTPATIENT)
Dept: FAMILY MEDICINE CLINIC | Facility: CLINIC | Age: 84
End: 2019-06-03
Payer: MEDICARE

## 2019-06-03 VITALS
RESPIRATION RATE: 16 BRPM | TEMPERATURE: 98 F | WEIGHT: 140.2 LBS | HEIGHT: 63 IN | BODY MASS INDEX: 24.84 KG/M2 | OXYGEN SATURATION: 97 % | SYSTOLIC BLOOD PRESSURE: 140 MMHG | DIASTOLIC BLOOD PRESSURE: 70 MMHG | HEART RATE: 71 BPM

## 2019-06-03 DIAGNOSIS — L84 CORN: ICD-10-CM

## 2019-06-03 DIAGNOSIS — I10 ESSENTIAL HYPERTENSION: ICD-10-CM

## 2019-06-03 DIAGNOSIS — R21 RASH: Primary | ICD-10-CM

## 2019-06-03 DIAGNOSIS — L56.8 PHOTODERMATITIS DUE TO SUN: ICD-10-CM

## 2019-06-03 PROBLEM — Z23 NEED FOR SHINGLES VACCINE: Status: RESOLVED | Noted: 2018-12-05 | Resolved: 2019-06-03

## 2019-06-03 PROCEDURE — 99214 OFFICE O/P EST MOD 30 MIN: CPT | Performed by: FAMILY MEDICINE

## 2019-06-21 DIAGNOSIS — I10 ESSENTIAL HYPERTENSION: ICD-10-CM

## 2019-06-21 RX ORDER — HYDROCHLOROTHIAZIDE 12.5 MG/1
12.5 CAPSULE, GELATIN COATED ORAL DAILY
Qty: 90 CAPSULE | Refills: 0 | Status: SHIPPED | OUTPATIENT
Start: 2019-06-21 | End: 2020-01-02

## 2019-10-13 PROBLEM — Z85.3 HISTORY OF BREAST CANCER: Status: ACTIVE | Noted: 2019-10-13

## 2019-10-13 NOTE — PATIENT INSTRUCTIONS
DISCUSSED HEALTH ISSUES  HEALTHY DIET AND EXERCISE  BW WILL BE OBTAINED  RECOMMEND CALCIUM 3046-9150 MG DAILY  VITAMIN D3  1000 IU DAILY  RV IN 1 YEAR FOR ANNUAL EXAM, SOONER IF NEEDED    Medicare Preventive Visit Patient Instructions  Thank you for completing your Welcome to Medicare Visit or Medicare Annual Wellness Visit today  Your next wellness visit will be due in one year (10/14/2020)  The screening/preventive services that you may require over the next 5-10 years are detailed below  Some tests may not apply to you based off risk factors and/or age  Screening tests ordered at today's visit but not completed yet may show as past due  Also, please note that scanned in results may not display below  Preventive Screenings:  Service Recommendations Previous Testing/Comments   Colorectal Cancer Screening  * Colonoscopy    * Fecal Occult Blood Test (FOBT)/Fecal Immunochemical Test (FIT)  * Fecal DNA/Cologuard Test  * Flexible Sigmoidoscopy Age: 54-65 years old   Colonoscopy: every 10 years (may be performed more frequently if at higher risk)  OR  FOBT/FIT: every 1 year  OR  Cologuard: every 3 years  OR  Sigmoidoscopy: every 5 years  Screening may be recommended earlier than age 48 if at higher risk for colorectal cancer  Also, an individualized decision between you and your healthcare provider will decide whether screening between the ages of 74-80 would be appropriate  Colonoscopy: Not on file  FOBT/FIT: Not on file  Cologuard: Not on file  Sigmoidoscopy: Not on file    Screening Not Indicated     Breast Cancer Screening Age: 36 years old  Frequency: every 1-2 years  Not required if history of left and right mastectomy Mammogram: 01/07/2019    History Breast Cancer   Cervical Cancer Screening Between the ages of 21-29, pap smear recommended once every 3 years  Between the ages of 33-67, can perform pap smear with HPV co-testing every 5 years     Recommendations may differ for women with a history of total hysterectomy, cervical cancer, or abnormal pap smears in past  Pap Smear: Not on file    Screening Not Indicated   Hepatitis C Screening Once for adults born between 1945 and 1965  More frequently in patients at high risk for Hepatitis C Hep C Antibody: Not on file       Diabetes Screening 1-2 times per year if you're at risk for diabetes or have pre-diabetes Fasting glucose: No results in last 5 years   A1C: No results in last 5 years       Cholesterol Screening Once every 5 years if you don't have a lipid disorder  May order more often based on risk factors  Lipid panel: 10/11/2018    Screening Current     Other Preventive Screenings Covered by Medicare:  1  Abdominal Aortic Aneurysm (AAA) Screening: covered once if your at risk  You're considered to be at risk if you have a family history of AAA  2  Lung Cancer Screening: covers low dose CT scan once per year if you meet all of the following conditions: (1) Age 50-69; (2) No signs or symptoms of lung cancer; (3) Current smoker or have quit smoking within the last 15 years; (4) You have a tobacco smoking history of at least 30 pack years (packs per day multiplied by number of years you smoked); (5) You get a written order from a healthcare provider  3  Glaucoma Screening: covered annually if you're considered high risk: (1) You have diabetes OR (2) Family history of glaucoma OR (3)  aged 48 and older OR (3)  American aged 72 and older  3  Osteoporosis Screening: covered every 2 years if you meet one of the following conditions: (1) You're estrogen deficient and at risk for osteoporosis based off medical history and other findings; (2) Have a vertebral abnormality; (3) On glucocorticoid therapy for more than 3 months; (4) Have primary hyperparathyroidism; (5) On osteoporosis medications and need to assess response to drug therapy  · Last bone density test (DXA Scan): Not on file    5  HIV Screening: covered annually if you're between the age of 12-76  Also covered annually if you are younger than 13 and older than 72 with risk factors for HIV infection  For pregnant patients, it is covered up to 3 times per pregnancy  Immunizations:  Immunization Recommendations   Influenza Vaccine Annual influenza vaccination during flu season is recommended for all persons aged >= 6 months who do not have contraindications   Pneumococcal Vaccine (Prevnar and Pneumovax)  * Prevnar = PCV13  * Pneumovax = PPSV23   Adults 25-60 years old: 1-3 doses may be recommended based on certain risk factors  Adults 72 years old: Prevnar (PCV13) vaccine recommended followed by Pneumovax (PPSV23) vaccine  If already received PPSV23 since turning 65, then PCV13 recommended at least one year after PPSV23 dose  Hepatitis B Vaccine 3 dose series if at intermediate or high risk (ex: diabetes, end stage renal disease, liver disease)   Tetanus (Td) Vaccine - COST NOT COVERED BY MEDICARE PART B Following completion of primary series, a booster dose should be given every 10 years to maintain immunity against tetanus  Td may also be given as tetanus wound prophylaxis  Tdap Vaccine - COST NOT COVERED BY MEDICARE PART B Recommended at least once for all adults  For pregnant patients, recommended with each pregnancy  Shingles Vaccine (Shingrix) - COST NOT COVERED BY MEDICARE PART B  2 shot series recommended in those aged 48 and above     Health Maintenance Due:  There are no preventive care reminders to display for this patient  Immunizations Due:      Topic Date Due    DTaP,Tdap,and Td Vaccines (1 - Tdap) 08/20/1951    INFLUENZA VACCINE  07/01/2019     Advance Directives   What are advance directives? Advance directives are legal documents that state your wishes and plans for medical care  These plans are made ahead of time in case you lose your ability to make decisions for yourself   Advance directives can apply to any medical decision, such as the treatments you want, and if you want to donate organs  What are the types of advance directives? There are many types of advance directives, and each state has rules about how to use them  You may choose a combination of any of the following:  · Living will: This is a written record of the treatment you want  You can also choose which treatments you do not want, which to limit, and which to stop at a certain time  This includes surgery, medicine, IV fluid, and tube feedings  · Durable power of  for healthcare [facility]): This is a written record that states who you want to make healthcare choices for you when you are unable to make them for yourself  This person, called a proxy, is usually a family member or a friend  You may choose more than 1 proxy  · Do not resuscitate (DNR) order:  A DNR order is used in case your heart stops beating or you stop breathing  It is a request not to have certain forms of treatment, such as CPR  A DNR order may be included in other types of advance directives  · Medical directive: This covers the care that you want if you are in a coma, near death, or unable to make decisions for yourself  You can list the treatments you want for each condition  Treatment may include pain medicine, surgery, blood transfusions, dialysis, IV or tube feedings, and a ventilator (breathing machine)  · Values history: This document has questions about your views, beliefs, and how you feel and think about life  This information can help others choose the care that you would choose  Why are advance directives important? An advance directive helps you control your care  Although spoken wishes may be used, it is better to have your wishes written down  Spoken wishes can be misunderstood, or not followed  Treatments may be given even if you do not want them  An advance directive may make it easier for your family to make difficult choices about your care     Weight Management   Why it is important to manage your weight: Being overweight increases your risk of health conditions such as heart disease, high blood pressure, type 2 diabetes, and certain types of cancer  It can also increase your risk for osteoarthritis, sleep apnea, and other respiratory problems  Aim for a slow, steady weight loss  Even a small amount of weight loss can lower your risk of health problems  How to lose weight safely:  A safe and healthy way to lose weight is to eat fewer calories and get regular exercise  You can lose up about 1 pound a week by decreasing the number of calories you eat by 500 calories each day  Healthy meal plan for weight management:  A healthy meal plan includes a variety of foods, contains fewer calories, and helps you stay healthy  A healthy meal plan includes the following:  · Eat whole-grain foods more often  A healthy meal plan should contain fiber  Fiber is the part of grains, fruits, and vegetables that is not broken down by your body  Whole-grain foods are healthy and provide extra fiber in your diet  Some examples of whole-grain foods are whole-wheat breads and pastas, oatmeal, brown rice, and bulgur  · Eat a variety of vegetables every day  Include dark, leafy greens such as spinach, kale, uma greens, and mustard greens  Eat yellow and orange vegetables such as carrots, sweet potatoes, and winter squash  · Eat a variety of fruits every day  Choose fresh or canned fruit (canned in its own juice or light syrup) instead of juice  Fruit juice has very little or no fiber  · Eat low-fat dairy foods  Drink fat-free (skim) milk or 1% milk  Eat fat-free yogurt and low-fat cottage cheese  Try low-fat cheeses such as mozzarella and other reduced-fat cheeses  · Choose meat and other protein foods that are low in fat  Choose beans or other legumes such as split peas or lentils  Choose fish, skinless poultry (chicken or turkey), or lean cuts of red meat (beef or pork)   Before you cook meat or poultry, cut off any visible fat    · Use less fat and oil  Try baking foods instead of frying them  Add less fat, such as margarine, sour cream, regular salad dressing and mayonnaise to foods  Eat fewer high-fat foods  Some examples of high-fat foods include french fries, doughnuts, ice cream, and cakes  · Eat fewer sweets  Limit foods and drinks that are high in sugar  This includes candy, cookies, regular soda, and sweetened drinks  Exercise:  Exercise at least 30 minutes per day on most days of the week  Some examples of exercise include walking, biking, dancing, and swimming  You can also fit in more physical activity by taking the stairs instead of the elevator or parking farther away from stores  Ask your healthcare provider about the best exercise plan for you  © Copyright [employer] 2018 Information is for End User's use only and may not be sold, redistributed or otherwise used for commercial purposes   All illustrations and images included in CareNotes® are the copyrighted property of [employer]  or [address] [place] [address]

## 2019-10-14 ENCOUNTER — OFFICE VISIT (OUTPATIENT)
Dept: FAMILY MEDICINE CLINIC | Facility: CLINIC | Age: 84
End: 2019-10-14
Payer: MEDICARE

## 2019-10-14 VITALS
SYSTOLIC BLOOD PRESSURE: 158 MMHG | HEART RATE: 65 BPM | OXYGEN SATURATION: 98 % | TEMPERATURE: 97.9 F | BODY MASS INDEX: 25.48 KG/M2 | DIASTOLIC BLOOD PRESSURE: 80 MMHG | RESPIRATION RATE: 16 BRPM | HEIGHT: 63 IN | WEIGHT: 143.8 LBS

## 2019-10-14 DIAGNOSIS — R82.998 LEUKOCYTES IN URINE: ICD-10-CM

## 2019-10-14 DIAGNOSIS — Z13.29 SCREENING FOR HYPOTHYROIDISM: ICD-10-CM

## 2019-10-14 DIAGNOSIS — G89.29 CHRONIC RIGHT SHOULDER PAIN: ICD-10-CM

## 2019-10-14 DIAGNOSIS — Z13.220 SCREENING FOR HYPERLIPIDEMIA: ICD-10-CM

## 2019-10-14 DIAGNOSIS — M25.511 CHRONIC RIGHT SHOULDER PAIN: ICD-10-CM

## 2019-10-14 DIAGNOSIS — I10 ESSENTIAL HYPERTENSION: Primary | ICD-10-CM

## 2019-10-14 DIAGNOSIS — Z85.3 HISTORY OF BREAST CANCER: ICD-10-CM

## 2019-10-14 PROBLEM — L84 CORN: Status: RESOLVED | Noted: 2019-04-11 | Resolved: 2019-10-14

## 2019-10-14 PROBLEM — R21 RASH: Status: RESOLVED | Noted: 2019-06-03 | Resolved: 2019-10-14

## 2019-10-14 PROBLEM — L56.8 PHOTODERMATITIS DUE TO SUN: Status: RESOLVED | Noted: 2019-06-03 | Resolved: 2019-10-14

## 2019-10-14 LAB
ALBUMIN SERPL BCP-MCNC: 3.8 G/DL (ref 3.5–5)
ALP SERPL-CCNC: 57 U/L (ref 46–116)
ALT SERPL W P-5'-P-CCNC: 21 U/L (ref 12–78)
ANION GAP SERPL CALCULATED.3IONS-SCNC: 6 MMOL/L (ref 4–13)
AST SERPL W P-5'-P-CCNC: 20 U/L (ref 5–45)
BILIRUB SERPL-MCNC: 0.88 MG/DL (ref 0.2–1)
BUN SERPL-MCNC: 31 MG/DL (ref 5–25)
CALCIUM SERPL-MCNC: 9.8 MG/DL (ref 8.3–10.1)
CHLORIDE SERPL-SCNC: 103 MMOL/L (ref 100–108)
CO2 SERPL-SCNC: 29 MMOL/L (ref 21–32)
CREAT SERPL-MCNC: 1.06 MG/DL (ref 0.6–1.3)
GFR SERPL CREATININE-BSD FRML MDRD: 47 ML/MIN/1.73SQ M
GLUCOSE SERPL-MCNC: 99 MG/DL (ref 65–140)
POTASSIUM SERPL-SCNC: 4.7 MMOL/L (ref 3.5–5.3)
PROT SERPL-MCNC: 7.3 G/DL (ref 6.4–8.2)
SL AMB  POCT GLUCOSE, UA: 0
SL AMB LEUKOCYTE ESTERASE,UA: ABNORMAL
SL AMB POCT BILIRUBIN,UA: 0
SL AMB POCT BLOOD,UA: 0
SL AMB POCT CLARITY,UA: CLEAR
SL AMB POCT COLOR,UA: YELLOW
SL AMB POCT KETONES,UA: 0
SL AMB POCT NITRITE,UA: ABNORMAL
SL AMB POCT PH,UA: 5
SL AMB POCT SPECIFIC GRAVITY,UA: 1.02
SL AMB POCT URINE PROTEIN: 0
SL AMB POCT UROBILINOGEN: 0
SODIUM SERPL-SCNC: 138 MMOL/L (ref 136–145)

## 2019-10-14 PROCEDURE — G0439 PPPS, SUBSEQ VISIT: HCPCS | Performed by: FAMILY MEDICINE

## 2019-10-14 PROCEDURE — 99213 OFFICE O/P EST LOW 20 MIN: CPT | Performed by: FAMILY MEDICINE

## 2019-10-14 PROCEDURE — 36415 COLL VENOUS BLD VENIPUNCTURE: CPT | Performed by: FAMILY MEDICINE

## 2019-10-14 PROCEDURE — 87077 CULTURE AEROBIC IDENTIFY: CPT | Performed by: FAMILY MEDICINE

## 2019-10-14 PROCEDURE — 81003 URINALYSIS AUTO W/O SCOPE: CPT | Performed by: FAMILY MEDICINE

## 2019-10-14 PROCEDURE — 80053 COMPREHEN METABOLIC PANEL: CPT | Performed by: FAMILY MEDICINE

## 2019-10-14 PROCEDURE — 87186 SC STD MICRODIL/AGAR DIL: CPT | Performed by: FAMILY MEDICINE

## 2019-10-14 PROCEDURE — 87086 URINE CULTURE/COLONY COUNT: CPT | Performed by: FAMILY MEDICINE

## 2019-10-14 NOTE — PROGRESS NOTES
Assessment/Plan:    Hypertension  STABLE  DENIES ANY CP, SOB, PALPITATIONS, OR HEADACHE  NOTES NO WATER RETENTION  COMPLIANT WITH MEDICATION  NO CONCERNS    - CONTINUE CURRENT TREATMENT PLAN  - MONITOR DIETARY SODIUM INTAKE  - ENCOURAGE PHYSICAL ACTIVITY  - RV 6 MONTHS         Diagnoses and all orders for this visit:    Essential hypertension  -     Comprehensive metabolic panel  -     POCT urine dip auto non-scope    Screening for hyperlipidemia    Screening for hypothyroidism    Chronic right shoulder pain    History of breast cancer    Leukocytes in urine  -     POCT urine dip auto non-scope  -     Urine culture          Patient Instructions     DISCUSSED HEALTH ISSUES  HEALTHY DIET AND EXERCISE  BW WILL BE OBTAINED  RECOMMEND CALCIUM 2047-5696 MG DAILY  VITAMIN D3  1000 IU DAILY  RV IN 1 YEAR FOR ANNUAL EXAM, SOONER IF NEEDED    Medicare Preventive Visit Patient Instructions  Thank you for completing your Welcome to Medicare Visit or Medicare Annual Wellness Visit today  Your next wellness visit will be due in one year (10/14/2020)  The screening/preventive services that you may require over the next 5-10 years are detailed below  Some tests may not apply to you based off risk factors and/or age  Screening tests ordered at today's visit but not completed yet may show as past due  Also, please note that scanned in results may not display below  Preventive Screenings:  Service Recommendations Previous Testing/Comments   Colorectal Cancer Screening  * Colonoscopy    * Fecal Occult Blood Test (FOBT)/Fecal Immunochemical Test (FIT)  * Fecal DNA/Cologuard Test  * Flexible Sigmoidoscopy Age: 54-65 years old   Colonoscopy: every 10 years (may be performed more frequently if at higher risk)  OR  FOBT/FIT: every 1 year  OR  Cologuard: every 3 years  OR  Sigmoidoscopy: every 5 years  Screening may be recommended earlier than age 48 if at higher risk for colorectal cancer   Also, an individualized decision between you and your healthcare provider will decide whether screening between the ages of 74-80 would be appropriate  Colonoscopy: Not on file  FOBT/FIT: Not on file  Cologuard: Not on file  Sigmoidoscopy: Not on file    Screening Not Indicated     Breast Cancer Screening Age: 36 years old  Frequency: every 1-2 years  Not required if history of left and right mastectomy Mammogram: 01/07/2019    History Breast Cancer   Cervical Cancer Screening Between the ages of 21-29, pap smear recommended once every 3 years  Between the ages of 33-67, can perform pap smear with HPV co-testing every 5 years  Recommendations may differ for women with a history of total hysterectomy, cervical cancer, or abnormal pap smears in past  Pap Smear: Not on file    Screening Not Indicated   Hepatitis C Screening Once for adults born between 1945 and 1965  More frequently in patients at high risk for Hepatitis C Hep C Antibody: Not on file       Diabetes Screening 1-2 times per year if you're at risk for diabetes or have pre-diabetes Fasting glucose: No results in last 5 years   A1C: No results in last 5 years       Cholesterol Screening Once every 5 years if you don't have a lipid disorder  May order more often based on risk factors  Lipid panel: 10/11/2018    Screening Current     Other Preventive Screenings Covered by Medicare:  1  Abdominal Aortic Aneurysm (AAA) Screening: covered once if your at risk  You're considered to be at risk if you have a family history of AAA  2  Lung Cancer Screening: covers low dose CT scan once per year if you meet all of the following conditions: (1) Age 50-69; (2) No signs or symptoms of lung cancer; (3) Current smoker or have quit smoking within the last 15 years; (4) You have a tobacco smoking history of at least 30 pack years (packs per day multiplied by number of years you smoked); (5) You get a written order from a healthcare provider    3  Glaucoma Screening: covered annually if you're considered high risk: (1) You have diabetes OR (2) Family history of glaucoma OR (3)  aged 48 and older OR (4)  American aged 72 and older  3  Osteoporosis Screening: covered every 2 years if you meet one of the following conditions: (1) You're estrogen deficient and at risk for osteoporosis based off medical history and other findings; (2) Have a vertebral abnormality; (3) On glucocorticoid therapy for more than 3 months; (4) Have primary hyperparathyroidism; (5) On osteoporosis medications and need to assess response to drug therapy  · Last bone density test (DXA Scan): Not on file  5  HIV Screening: covered annually if you're between the age of 12-76  Also covered annually if you are younger than 13 and older than 72 with risk factors for HIV infection  For pregnant patients, it is covered up to 3 times per pregnancy  Immunizations:  Immunization Recommendations   Influenza Vaccine Annual influenza vaccination during flu season is recommended for all persons aged >= 6 months who do not have contraindications   Pneumococcal Vaccine (Prevnar and Pneumovax)  * Prevnar = PCV13  * Pneumovax = PPSV23   Adults 25-60 years old: 1-3 doses may be recommended based on certain risk factors  Adults 72 years old: Prevnar (PCV13) vaccine recommended followed by Pneumovax (PPSV23) vaccine  If already received PPSV23 since turning 65, then PCV13 recommended at least one year after PPSV23 dose  Hepatitis B Vaccine 3 dose series if at intermediate or high risk (ex: diabetes, end stage renal disease, liver disease)   Tetanus (Td) Vaccine - COST NOT COVERED BY MEDICARE PART B Following completion of primary series, a booster dose should be given every 10 years to maintain immunity against tetanus  Td may also be given as tetanus wound prophylaxis  Tdap Vaccine - COST NOT COVERED BY MEDICARE PART B Recommended at least once for all adults  For pregnant patients, recommended with each pregnancy     Shingles Vaccine (Shingrix) - COST NOT COVERED BY MEDICARE PART B  2 shot series recommended in those aged 48 and above     Health Maintenance Due:  There are no preventive care reminders to display for this patient  Immunizations Due:      Topic Date Due    DTaP,Tdap,and Td Vaccines (1 - Tdap) 08/20/1951    INFLUENZA VACCINE  07/01/2019     Advance Directives   What are advance directives? Advance directives are legal documents that state your wishes and plans for medical care  These plans are made ahead of time in case you lose your ability to make decisions for yourself  Advance directives can apply to any medical decision, such as the treatments you want, and if you want to donate organs  What are the types of advance directives? There are many types of advance directives, and each state has rules about how to use them  You may choose a combination of any of the following:  · Living will: This is a written record of the treatment you want  You can also choose which treatments you do not want, which to limit, and which to stop at a certain time  This includes surgery, medicine, IV fluid, and tube feedings  · Durable power of  for healthcare [facility]): This is a written record that states who you want to make healthcare choices for you when you are unable to make them for yourself  This person, called a proxy, is usually a family member or a friend  You may choose more than 1 proxy  · Do not resuscitate (DNR) order:  A DNR order is used in case your heart stops beating or you stop breathing  It is a request not to have certain forms of treatment, such as CPR  A DNR order may be included in other types of advance directives  · Medical directive: This covers the care that you want if you are in a coma, near death, or unable to make decisions for yourself  You can list the treatments you want for each condition   Treatment may include pain medicine, surgery, blood transfusions, dialysis, IV or tube feedings, and a ventilator (breathing machine)  · Values history: This document has questions about your views, beliefs, and how you feel and think about life  This information can help others choose the care that you would choose  Why are advance directives important? An advance directive helps you control your care  Although spoken wishes may be used, it is better to have your wishes written down  Spoken wishes can be misunderstood, or not followed  Treatments may be given even if you do not want them  An advance directive may make it easier for your family to make difficult choices about your care  Weight Management   Why it is important to manage your weight:  Being overweight increases your risk of health conditions such as heart disease, high blood pressure, type 2 diabetes, and certain types of cancer  It can also increase your risk for osteoarthritis, sleep apnea, and other respiratory problems  Aim for a slow, steady weight loss  Even a small amount of weight loss can lower your risk of health problems  How to lose weight safely:  A safe and healthy way to lose weight is to eat fewer calories and get regular exercise  You can lose up about 1 pound a week by decreasing the number of calories you eat by 500 calories each day  Healthy meal plan for weight management:  A healthy meal plan includes a variety of foods, contains fewer calories, and helps you stay healthy  A healthy meal plan includes the following:  · Eat whole-grain foods more often  A healthy meal plan should contain fiber  Fiber is the part of grains, fruits, and vegetables that is not broken down by your body  Whole-grain foods are healthy and provide extra fiber in your diet  Some examples of whole-grain foods are whole-wheat breads and pastas, oatmeal, brown rice, and bulgur  · Eat a variety of vegetables every day  Include dark, leafy greens such as spinach, kale, uma greens, and mustard greens   Eat yellow and orange vegetables such as carrots, sweet potatoes, and winter squash  · Eat a variety of fruits every day  Choose fresh or canned fruit (canned in its own juice or light syrup) instead of juice  Fruit juice has very little or no fiber  · Eat low-fat dairy foods  Drink fat-free (skim) milk or 1% milk  Eat fat-free yogurt and low-fat cottage cheese  Try low-fat cheeses such as mozzarella and other reduced-fat cheeses  · Choose meat and other protein foods that are low in fat  Choose beans or other legumes such as split peas or lentils  Choose fish, skinless poultry (chicken or turkey), or lean cuts of red meat (beef or pork)  Before you cook meat or poultry, cut off any visible fat  · Use less fat and oil  Try baking foods instead of frying them  Add less fat, such as margarine, sour cream, regular salad dressing and mayonnaise to foods  Eat fewer high-fat foods  Some examples of high-fat foods include french fries, doughnuts, ice cream, and cakes  · Eat fewer sweets  Limit foods and drinks that are high in sugar  This includes candy, cookies, regular soda, and sweetened drinks  Exercise:  Exercise at least 30 minutes per day on most days of the week  Some examples of exercise include walking, biking, dancing, and swimming  You can also fit in more physical activity by taking the stairs instead of the elevator or parking farther away from stores  Ask your healthcare provider about the best exercise plan for you  © Copyright [employer] 2018 Information is for End User's use only and may not be sold, redistributed or otherwise used for commercial purposes  All illustrations and images included in CareNotes® are the copyrighted property of [employer]  or [employer]         Return in about 6 months (around 4/14/2020) for Recheck  Subjective:      Patient ID: Mckenzie Sheridan is a 80 y o  female      Chief Complaint   Patient presents with   [facility] Wellness Visit       PATIENT RETURNS FOR ANNUAL WELLNESS EXAM AND ANNUAL EVALUATION OF PATIENT'S MEDICAL ISSUES    INDIVIDUAL MEDICAL ISSUES WITH THEIR CURRENT STATUS, ASSESSMENT AND PLANS ARE LISTED ABOVE          The following portions of the patient's history were reviewed and updated as appropriate: allergies, current medications, past family history, past medical history, past social history, past surgical history and problem list     Review of Systems   Constitutional: Negative for chills, fatigue and fever  HENT: Negative for congestion, ear discharge, ear pain, mouth sores, postnasal drip, sore throat and trouble swallowing  Eyes: Negative for pain, discharge and visual disturbance  Respiratory: Negative for cough, shortness of breath and wheezing  Cardiovascular: Negative for chest pain, palpitations and leg swelling  Gastrointestinal: Negative for abdominal distention, abdominal pain, blood in stool, diarrhea and nausea  Endocrine: Negative for polydipsia, polyphagia and polyuria  Genitourinary: Negative for dysuria, frequency, hematuria and urgency  Musculoskeletal: Negative for arthralgias, gait problem and joint swelling  Skin: Negative for pallor and rash  Neurological: Negative for dizziness, syncope, speech difficulty, weakness, light-headedness, numbness and headaches  Hematological: Negative for adenopathy  Psychiatric/Behavioral: Negative for behavioral problems, confusion and sleep disturbance  The patient is not nervous/anxious            Current Outpatient Medications   Medication Sig Dispense Refill    hydrochlorothiazide (MICROZIDE) 12 5 mg capsule Take 1 capsule (12 5 mg total) by mouth daily 90 capsule 1    metoprolol succinate (TOPROL-XL) 25 mg 24 hr tablet Take 1 tablet (25 mg total) by mouth daily 90 tablet 1    metoprolol succinate (TOPROL-XL) 50 mg 24 hr tablet Take 1 tablet (50 mg total) by mouth daily 90 tablet 1    hydrochlorothiazide (MICROZIDE) 12 5 mg capsule TAKE 1 CAPSULE (12 5 MG TOTAL) BY MOUTH DAILY (Patient not taking: Reported on 10/14/2019) 90 capsule 0     No current facility-administered medications for this visit  Objective:    /80 (BP Location: Left arm, Patient Position: Sitting, Cuff Size: Standard)   Pulse 65   Temp 97 9 °F (36 6 °C) (Temporal)   Resp 16   Ht 5' 3" (1 6 m)   Wt 65 2 kg (143 lb 12 8 oz)   SpO2 98%   BMI 25 47 kg/m²        Physical Exam   Constitutional: She is oriented to person, place, and time  She appears well-developed and well-nourished  HENT:   Head: Normocephalic and atraumatic  Right Ear: External ear normal    Left Ear: External ear normal    Nose: Nose normal    Mouth/Throat: Oropharynx is clear and moist    Eyes: Pupils are equal, round, and reactive to light  Conjunctivae and EOM are normal  Right eye exhibits no discharge  Left eye exhibits no discharge  Neck: Normal range of motion  Neck supple  No thyromegaly present  Cardiovascular: Normal rate, regular rhythm, normal heart sounds and intact distal pulses  No murmur heard  Pulmonary/Chest: Effort normal and breath sounds normal  She has no wheezes  She has no rales  No breast tenderness or discharge  Abdominal: Soft  Bowel sounds are normal  She exhibits no distension and no mass  There is no tenderness  There is no rebound and no guarding  Genitourinary: No breast tenderness or discharge  Musculoskeletal: Normal range of motion  She exhibits edema  She exhibits no tenderness or deformity  MILD DJD CHANGES  MILD LYMPHEDEMA   Lymphadenopathy:     She has no cervical adenopathy  Neurological: She is alert and oriented to person, place, and time  She has normal reflexes  No cranial nerve deficit  She exhibits normal muscle tone  Coordination normal    Skin: Skin is warm and dry  No rash noted  No erythema  Psychiatric: She has a normal mood and affect  Her behavior is normal  Judgment and thought content normal    Vitals reviewed               Lolis Mancera MD

## 2019-10-14 NOTE — PROGRESS NOTES
Assessment and Plan:     Problem List Items Addressed This Visit        Cardiovascular and Mediastinum    Hypertension - Primary     STABLE  DENIES ANY CP, SOB, PALPITATIONS, OR HEADACHE  NOTES NO WATER RETENTION  COMPLIANT WITH MEDICATION  NO CONCERNS    - CONTINUE CURRENT TREATMENT PLAN  - MONITOR DIETARY SODIUM INTAKE  - ENCOURAGE PHYSICAL ACTIVITY  - RV 6 MONTHS           Relevant Orders    Comprehensive metabolic panel    POCT urine dip auto non-scope (Completed)       Other    Chronic right shoulder pain    History of breast cancer    RESOLVED: Screening for hyperlipidemia    RESOLVED: Screening for hypothyroidism      Other Visit Diagnoses     Leukocytes in urine        Relevant Orders    POCT urine dip auto non-scope (Completed)    Urine culture           Preventive health issues were discussed with patient, and age appropriate screening tests were ordered as noted in patient's After Visit Summary  Personalized health advice and appropriate referrals for health education or preventive services given if needed, as noted in patient's After Visit Summary       History of Present Illness:     Patient presents for Medicare Annual Wellness visit    Patient Care Team:  Kimberly Russo MD as PCP - Adeola Sharpe MD (Oncology)     Problem List:     Patient Active Problem List   Diagnosis    Hypertension    Chronic right shoulder pain    History of breast cancer      Past Medical and Surgical History:     Past Medical History:   Diagnosis Date    Essential hypertension     LAST ASSESSED: 4/27/15    Malignant neoplasm of breast ([address] )     RIGHT; LAST ASSESSED: 4/27/15    Osteoporosis     LAST ASSESSED: 4/27/15     Past Surgical History:   Procedure Laterality Date    BLADDER SURGERY      BREAST LUMPECTOMY      LAST ASSESSED: 4/27/15      Family History:     Family History   Problem Relation Age of Onset    Stroke Mother         CEREBROVASCULAR ACCIDENT    Hypertension Mother     Ovarian cancer Mother     Pancreatic cancer Mother         PANCREAS CARCINOMA    Pancreatic cancer Brother         PANCREAS CARCINOMA    Bipolar disorder Other     Substance Abuse Neg Hx     Mental illness Neg Hx       Social History:     Social History     Socioeconomic History    Marital status:       Spouse name: None    Number of children: None    Years of education: None    Highest education level: None   Occupational History    None   Social Needs    Financial resource strain: None    Food insecurity:     Worry: None     Inability: None    Transportation needs:     Medical: None     Non-medical: None   Tobacco Use    Smoking status: Never Smoker    Smokeless tobacco: Never Used   Substance and Sexual Activity    Alcohol use: No     Comment: ALCOHOL USE: WINE ON OCCASION, OCCASIONAL ALCOHOL USE AS PER ALLSCRIPTS    Drug use: No    Sexual activity: None   Lifestyle    Physical activity:     Days per week: None     Minutes per session: None    Stress: None   Relationships    Social connections:     Talks on phone: None     Gets together: None     Attends Jew service: None     Active member of club or organization: None     Attends meetings of clubs or organizations: None     Relationship status: None    Intimate partner violence:     Fear of current or ex partner: None     Emotionally abused: None     Physically abused: None     Forced sexual activity: None   Other Topics Concern    None   Social History Narrative    CAFFEINE USE    DENTAL CARE, REGULARLY    DRINKS COFFEE    TEA       Medications and Allergies:     Current Outpatient Medications   Medication Sig Dispense Refill    hydrochlorothiazide (MICROZIDE) 12 5 mg capsule Take 1 capsule (12 5 mg total) by mouth daily 90 capsule 1    metoprolol succinate (TOPROL-XL) 25 mg 24 hr tablet Take 1 tablet (25 mg total) by mouth daily 90 tablet 1    metoprolol succinate (TOPROL-XL) 50 mg 24 hr tablet Take 1 tablet (50 mg total) by mouth daily 90 tablet 1    hydrochlorothiazide (MICROZIDE) 12 5 mg capsule TAKE 1 CAPSULE (12 5 MG TOTAL) BY MOUTH DAILY (Patient not taking: Reported on 10/14/2019) 90 capsule 0     No current facility-administered medications for this visit  Allergies   Allergen Reactions    Penicillins     Sulfa Antibiotics       Immunizations:     Immunization History   Administered Date(s) Administered    Influenza Quadrivalent Preservative Free 3 years and older IM 10/22/2015    Influenza Quadrivalent, 6-35 Months IM 10/22/2015    Influenza Split High Dose Preservative Free IM 11/01/2017    Influenza TIV (IM) 11/21/2014    Influenza, high dose seasonal 0 5 mL 10/11/2018    Pneumococcal Conjugate 13-Valent 05/17/2016    Pneumococcal Polysaccharide PPV23 10/11/2018      Health Maintenance: There are no preventive care reminders to display for this patient  Topic Date Due    DTaP,Tdap,and Td Vaccines (1 - Tdap) 08/20/1951    INFLUENZA VACCINE  07/01/2019      Medicare Health Risk Assessment:     /80 (BP Location: Left arm, Patient Position: Sitting, Cuff Size: Standard)   Pulse 65   Temp 97 9 °F (36 6 °C) (Temporal)   Resp 16   Ht 5' 3" (1 6 m)   Wt 65 2 kg (143 lb 12 8 oz)   SpO2 98%   BMI 25 47 kg/m²      Minerva is here for her Subsequent Wellness visit  Health Risk Assessment:   Patient rates overall health as very good  Patient feels that their physical health rating is same  Eyesight was rated as much worse  Hearing was rated as same  Patient feels that their emotional and mental health rating is same  Pain experienced in the last 7 days has been none  Depression Screening:   PHQ-2 Score: 0      Fall Risk Screening: In the past year, patient has experienced: no history of falling in past year      Urinary Incontinence Screening:   Patient has not leaked urine accidently in the last six months  Home Safety:  Patient does not have trouble with stairs inside or outside of their home  Patient has working smoke alarms and has working carbon monoxide detector  Home safety hazards include: none  Nutrition:   Current diet is Regular  Medications:   Patient is currently taking over-the-counter supplements  OTC medications include: see medication list  Patient is able to manage medications  Activities of Daily Living (ADLs)/Instrumental Activities of Daily Living (IADLs):   Walk and transfer into and out of bed and chair?: Yes  Dress and groom yourself?: Yes    Bathe or shower yourself?: Yes    Feed yourself?  Yes  Do your laundry/housekeeping?: Yes  Manage your money, pay your bills and track your expenses?: Yes  Make your own meals?: Yes    Do your own shopping?: Yes    Previous Hospitalizations:   Any hospitalizations or ED visits within the last 12 months?: No      Advance Care Planning:   Living will: No    Durable POA for healthcare: No    Advanced directive: No    Five wishes given: No    Patient declined ACP directive: No    End of Life Decisions reviewed with patient: No      Cognitive Screening:   Provider or family/friend/caregiver concerned regarding cognition?: No    PREVENTIVE SCREENINGS      Cardiovascular Screening:    General: Screening Current      Diabetes Screening:     General: Screening Current    Due for: Blood Glucose      Colorectal Cancer Screening:     General: Screening Not Indicated      Breast Cancer Screening:     General: History Breast Cancer      Cervical Cancer Screening:    General: Screening Not Indicated      Osteoporosis Screening:    General: Screening Not Indicated      Abdominal Aortic Aneurysm (AAA) Screening:        General: Screening Not Indicated      Lung Cancer Screening:     General: Screening Not Indicated      Hepatitis C Screening:    General: Screening Not Indicated      Bekah Grimaldo MD

## 2019-10-16 DIAGNOSIS — N30.90 CYSTITIS: Primary | ICD-10-CM

## 2019-10-16 LAB — BACTERIA UR CULT: ABNORMAL

## 2019-10-16 RX ORDER — CIPROFLOXACIN 250 MG/1
250 TABLET, FILM COATED ORAL EVERY 12 HOURS SCHEDULED
Qty: 20 TABLET | Refills: 0 | Status: SHIPPED | OUTPATIENT
Start: 2019-10-16 | End: 2019-10-26

## 2019-11-18 DIAGNOSIS — I10 ESSENTIAL HYPERTENSION: ICD-10-CM

## 2019-11-18 RX ORDER — METOPROLOL SUCCINATE 25 MG/1
25 TABLET, EXTENDED RELEASE ORAL DAILY
Qty: 90 TABLET | Refills: 1 | Status: SHIPPED | OUTPATIENT
Start: 2019-11-18 | End: 2020-05-26 | Stop reason: SDUPTHER

## 2019-11-18 RX ORDER — METOPROLOL SUCCINATE 50 MG/1
50 TABLET, EXTENDED RELEASE ORAL DAILY
Qty: 90 TABLET | Refills: 1 | Status: SHIPPED | OUTPATIENT
Start: 2019-11-18 | End: 2020-05-26 | Stop reason: SDUPTHER

## 2020-01-02 DIAGNOSIS — I10 ESSENTIAL HYPERTENSION: ICD-10-CM

## 2020-01-02 RX ORDER — HYDROCHLOROTHIAZIDE 12.5 MG/1
12.5 CAPSULE, GELATIN COATED ORAL DAILY
Qty: 90 CAPSULE | Refills: 1 | Status: SHIPPED | OUTPATIENT
Start: 2020-01-02 | End: 2020-07-06 | Stop reason: SDUPTHER

## 2020-02-20 PROBLEM — Z01.818 PREOPERATIVE CLEARANCE: Status: ACTIVE | Noted: 2020-02-20

## 2020-02-20 PROBLEM — H25.013 CORTICAL AGE-RELATED CATARACT OF BOTH EYES: Status: ACTIVE | Noted: 2020-02-20

## 2020-02-26 ENCOUNTER — OFFICE VISIT (OUTPATIENT)
Dept: FAMILY MEDICINE CLINIC | Facility: CLINIC | Age: 85
End: 2020-02-26
Payer: MEDICARE

## 2020-02-26 VITALS
HEIGHT: 63 IN | BODY MASS INDEX: 25.52 KG/M2 | OXYGEN SATURATION: 97 % | RESPIRATION RATE: 16 BRPM | WEIGHT: 144 LBS | DIASTOLIC BLOOD PRESSURE: 74 MMHG | HEART RATE: 66 BPM | SYSTOLIC BLOOD PRESSURE: 130 MMHG | TEMPERATURE: 98.1 F

## 2020-02-26 DIAGNOSIS — I10 ESSENTIAL HYPERTENSION: ICD-10-CM

## 2020-02-26 DIAGNOSIS — Z01.818 PREOPERATIVE CLEARANCE: Primary | ICD-10-CM

## 2020-02-26 DIAGNOSIS — H25.013 CORTICAL AGE-RELATED CATARACT OF BOTH EYES: ICD-10-CM

## 2020-02-26 DIAGNOSIS — Z85.3 HISTORY OF BREAST CANCER: ICD-10-CM

## 2020-02-26 LAB — ECG INTERP DURING EX: ABNORMAL MS

## 2020-02-26 PROCEDURE — 4040F PNEUMOC VAC/ADMIN/RCVD: CPT | Performed by: FAMILY MEDICINE

## 2020-02-26 PROCEDURE — 3008F BODY MASS INDEX DOCD: CPT | Performed by: FAMILY MEDICINE

## 2020-02-26 PROCEDURE — 3075F SYST BP GE 130 - 139MM HG: CPT | Performed by: FAMILY MEDICINE

## 2020-02-26 PROCEDURE — 99214 OFFICE O/P EST MOD 30 MIN: CPT | Performed by: FAMILY MEDICINE

## 2020-02-26 PROCEDURE — 1160F RVW MEDS BY RX/DR IN RCRD: CPT | Performed by: FAMILY MEDICINE

## 2020-02-26 PROCEDURE — 93000 ELECTROCARDIOGRAM COMPLETE: CPT | Performed by: FAMILY MEDICINE

## 2020-02-26 PROCEDURE — 3078F DIAST BP <80 MM HG: CPT | Performed by: FAMILY MEDICINE

## 2020-02-26 PROCEDURE — 1036F TOBACCO NON-USER: CPT | Performed by: FAMILY MEDICINE

## 2020-02-26 NOTE — PROGRESS NOTES
BMI Counseling: Body mass index is 25 51 kg/m²  The BMI is above normal  Nutrition recommendations include encouraging healthy choices of fruits and vegetables and moderation in carbohydrate intake  Exercise recommendations include exercising 3-5 times per week  No pharmacotherapy was ordered  Assessment/Plan:    No problem-specific Assessment & Plan notes found for this encounter  Diagnoses and all orders for this visit:    Preoperative clearance  -     POCT ECG  -     Basic metabolic panel    Cortical age-related cataract of both eyes    Essential hypertension  -     POCT ECG  -     Basic metabolic panel    History of breast cancer          Patient Instructions   THERE IS NO OBVIOUS MEDICAL CONTRAINDICATION FOR SURGERY UNDER LOCAL ANESTHESIA WITH SEDATION      Return if symptoms worsen or fail to improve, for Next scheduled follow up  Subjective:      Patient ID: Ge Velázquez is a 80 y o  female  Chief Complaint   Patient presents with    Pre-op Exam     cataracts       PATIENT COMES IN FOR PRE OPERATIVE CLEARANCE FOR ELECTIVE CATARACT SURGERY    DATE OF PROCEDURE 3/3/2020    SURGEON - DR SARAH    PAST MEDICAL HISTORY WAS REVIEWED    ECG WAS REVIEWED    THERE IS NO OBVIOUS MEDICAL CONTRAINDICATION FOR SURGERY UNDER LOCAL ANESTHESIA WITH SEDATION      The following portions of the patient's history were reviewed and updated as appropriate: allergies, current medications, past family history, past medical history, past social history, past surgical history and problem list     Review of Systems   Constitutional: Negative for chills, fatigue and fever  HENT: Negative for congestion, ear discharge, ear pain, mouth sores, postnasal drip, sore throat and trouble swallowing  Eyes: Negative for pain, discharge and visual disturbance  Respiratory: Negative for cough, shortness of breath and wheezing  Cardiovascular: Negative for chest pain, palpitations and leg swelling     Gastrointestinal: Negative for abdominal distention, abdominal pain, blood in stool, diarrhea and nausea  Endocrine: Negative for polydipsia, polyphagia and polyuria  Genitourinary: Negative for dysuria, frequency, hematuria and urgency  Musculoskeletal: Negative for arthralgias, gait problem and joint swelling  Skin: Negative for pallor and rash  Neurological: Negative for dizziness, syncope, speech difficulty, weakness, light-headedness, numbness and headaches  Hematological: Negative for adenopathy  Psychiatric/Behavioral: Negative for behavioral problems, confusion and sleep disturbance  The patient is not nervous/anxious  Current Outpatient Medications   Medication Sig Dispense Refill    hydrochlorothiazide (MICROZIDE) 12 5 mg capsule Take 1 capsule (12 5 mg total) by mouth daily 90 capsule 1    metoprolol succinate (TOPROL-XL) 25 mg 24 hr tablet Take 1 tablet (25 mg total) by mouth daily 90 tablet 1    metoprolol succinate (TOPROL-XL) 50 mg 24 hr tablet Take 1 tablet (50 mg total) by mouth daily 90 tablet 1     No current facility-administered medications for this visit  Objective:    /74   Pulse 66   Temp 98 1 °F (36 7 °C) (Temporal)   Resp 16   Ht 5' 3" (1 6 m)   Wt 65 3 kg (144 lb)   SpO2 97%   BMI 25 51 kg/m²        Physical Exam   Constitutional: She is oriented to person, place, and time  She appears well-developed and well-nourished  HENT:   Head: Normocephalic and atraumatic  Eyes: Pupils are equal, round, and reactive to light  Conjunctivae and EOM are normal  Right eye exhibits no discharge  Left eye exhibits no discharge  Neck: Normal range of motion  Neck supple  No thyromegaly present  Cardiovascular: Normal rate, regular rhythm and normal heart sounds  No murmur heard  Pulmonary/Chest: Effort normal and breath sounds normal  No respiratory distress  She has no wheezes  She has no rales  Abdominal: Soft  Bowel sounds are normal  There is no tenderness  Musculoskeletal: Normal range of motion  She exhibits no edema or tenderness  Lymphadenopathy:     She has no cervical adenopathy  Neurological: She is alert and oriented to person, place, and time  Skin: Skin is warm and dry  No rash noted  No erythema  Psychiatric: She has a normal mood and affect   Her behavior is normal  Judgment and thought content normal               Alysia Dorsey MD

## 2020-02-27 ENCOUNTER — TELEPHONE (OUTPATIENT)
Dept: FAMILY MEDICINE CLINIC | Facility: CLINIC | Age: 85
End: 2020-02-27

## 2020-02-27 LAB
BUN SERPL-MCNC: 27 MG/DL (ref 8–27)
BUN/CREAT SERPL: 23 (ref 12–28)
CALCIUM SERPL-MCNC: 9.8 MG/DL (ref 8.7–10.3)
CHLORIDE SERPL-SCNC: 97 MMOL/L (ref 96–106)
CO2 SERPL-SCNC: 25 MMOL/L (ref 20–29)
CREAT SERPL-MCNC: 1.2 MG/DL (ref 0.57–1)
GLUCOSE SERPL-MCNC: 87 MG/DL (ref 65–99)
POTASSIUM SERPL-SCNC: 5.1 MMOL/L (ref 3.5–5.2)
SL AMB EGFR AFRICAN AMERICAN: 46 ML/MIN/1.73
SL AMB EGFR NON AFRICAN AMERICAN: 40 ML/MIN/1.73
SODIUM SERPL-SCNC: 139 MMOL/L (ref 134–144)

## 2020-02-27 NOTE — TELEPHONE ENCOUNTER
Called to see if her medical clearance was done and approved  They have not received anything    Her surgery is on Tuesday    Fax - [phone]

## 2020-05-26 DIAGNOSIS — I10 ESSENTIAL HYPERTENSION: ICD-10-CM

## 2020-05-26 RX ORDER — METOPROLOL SUCCINATE 25 MG/1
25 TABLET, EXTENDED RELEASE ORAL DAILY
Qty: 90 TABLET | Refills: 1 | Status: SHIPPED | OUTPATIENT
Start: 2020-05-26 | End: 2020-11-19 | Stop reason: SDUPTHER

## 2020-05-26 RX ORDER — METOPROLOL SUCCINATE 50 MG/1
50 TABLET, EXTENDED RELEASE ORAL DAILY
Qty: 90 TABLET | Refills: 1 | Status: SHIPPED | OUTPATIENT
Start: 2020-05-26 | End: 2020-11-19 | Stop reason: SDUPTHER

## 2020-07-06 DIAGNOSIS — I10 ESSENTIAL HYPERTENSION: ICD-10-CM

## 2020-07-06 RX ORDER — HYDROCHLOROTHIAZIDE 12.5 MG/1
12.5 CAPSULE, GELATIN COATED ORAL DAILY
Qty: 90 CAPSULE | Refills: 1 | Status: SHIPPED | OUTPATIENT
Start: 2020-07-06 | End: 2021-01-04 | Stop reason: SDUPTHER

## 2020-11-19 ENCOUNTER — TELEPHONE (OUTPATIENT)
Dept: FAMILY MEDICINE CLINIC | Facility: CLINIC | Age: 85
End: 2020-11-19

## 2020-11-19 DIAGNOSIS — I10 ESSENTIAL HYPERTENSION: ICD-10-CM

## 2020-11-19 RX ORDER — METOPROLOL SUCCINATE 50 MG/1
50 TABLET, EXTENDED RELEASE ORAL DAILY
Qty: 90 TABLET | Refills: 1 | Status: SHIPPED | OUTPATIENT
Start: 2020-11-19 | End: 2021-05-27 | Stop reason: SDUPTHER

## 2020-11-19 RX ORDER — METOPROLOL SUCCINATE 25 MG/1
25 TABLET, EXTENDED RELEASE ORAL DAILY
Qty: 90 TABLET | Refills: 1 | Status: SHIPPED | OUTPATIENT
Start: 2020-11-19 | End: 2021-05-10 | Stop reason: ALTCHOICE

## 2021-01-04 DIAGNOSIS — I10 ESSENTIAL HYPERTENSION: ICD-10-CM

## 2021-01-04 RX ORDER — HYDROCHLOROTHIAZIDE 12.5 MG/1
12.5 CAPSULE, GELATIN COATED ORAL DAILY
Qty: 90 CAPSULE | Refills: 1 | Status: SHIPPED | OUTPATIENT
Start: 2021-01-04 | End: 2022-03-28

## 2021-03-12 ENCOUNTER — IMMUNIZATIONS (OUTPATIENT)
Dept: FAMILY MEDICINE CLINIC | Facility: HOSPITAL | Age: 86
End: 2021-03-12

## 2021-03-12 DIAGNOSIS — Z23 ENCOUNTER FOR IMMUNIZATION: Primary | ICD-10-CM

## 2021-03-12 PROCEDURE — 0011A SARS-COV-2 / COVID-19 MRNA VACCINE (MODERNA) 100 MCG: CPT

## 2021-03-12 PROCEDURE — 91301 SARS-COV-2 / COVID-19 MRNA VACCINE (MODERNA) 100 MCG: CPT

## 2021-04-23 ENCOUNTER — TELEPHONE (OUTPATIENT)
Dept: FAMILY MEDICINE CLINIC | Facility: CLINIC | Age: 86
End: 2021-04-23

## 2021-04-23 NOTE — TELEPHONE ENCOUNTER
Wanted to let you know that Cheri Bryan fell on 4/14 while at [place]  Went to [facility] and she broke her hip  While she was there she was having heart issues and they put in a pacemaker  She also had a catherization  Then on 4/16 she had hip replacement  [facility]  She will call when she gets discharged

## 2021-04-27 ENCOUNTER — TELEPHONE (OUTPATIENT)
Dept: FAMILY MEDICINE CLINIC | Facility: CLINIC | Age: 86
End: 2021-04-27

## 2021-04-27 NOTE — TELEPHONE ENCOUNTER
Coming in on 5/7 for SALLIE with Carlitos Sotelo    [facility] and being discharged on 5/1/21

## 2021-05-03 ENCOUNTER — TRANSITIONAL CARE MANAGEMENT (OUTPATIENT)
Dept: FAMILY MEDICINE CLINIC | Facility: CLINIC | Age: 86
End: 2021-05-03

## 2021-05-04 ENCOUNTER — TELEPHONE (OUTPATIENT)
Dept: FAMILY MEDICINE CLINIC | Facility: CLINIC | Age: 86
End: 2021-05-04

## 2021-05-04 RX ORDER — APIXABAN 5 MG/1
TABLET, FILM COATED ORAL
COMMUNITY
Start: 2021-04-30 | End: 2021-05-27 | Stop reason: SDUPTHER

## 2021-05-04 RX ORDER — AMIODARONE HYDROCHLORIDE 200 MG/1
TABLET ORAL
COMMUNITY
Start: 2021-04-30 | End: 2021-05-27 | Stop reason: SDUPTHER

## 2021-05-04 RX ORDER — NITROFURANTOIN 25; 75 MG/1; MG/1
CAPSULE ORAL
COMMUNITY
Start: 2021-04-30 | End: 2021-05-10 | Stop reason: ALTCHOICE

## 2021-05-04 RX ORDER — LOSARTAN POTASSIUM 25 MG/1
TABLET ORAL
COMMUNITY
Start: 2021-04-30 | End: 2021-05-27 | Stop reason: SDUPTHER

## 2021-05-04 RX ORDER — FUROSEMIDE 20 MG/1
TABLET ORAL
COMMUNITY
Start: 2021-04-30 | End: 2021-05-27 | Stop reason: SDUPTHER

## 2021-05-04 NOTE — TELEPHONE ENCOUNTER
Patient stated she is not coming in the office for her visit   She is currently living at her sons home in [address] [place] [address] Way did offer a virtual appt but she declined also  Patient stated she has a lot  of support at her sons home  Her daughter is a RN and handles all her medications   Patient also stated if she has any questions she will call the office Earnest Cramer LPN

## 2021-05-06 ENCOUNTER — TELEPHONE (OUTPATIENT)
Dept: FAMILY MEDICINE CLINIC | Facility: CLINIC | Age: 86
End: 2021-05-06

## 2021-05-06 NOTE — TELEPHONE ENCOUNTER
Jessica Gloria today  He noted her BP was 160/82  Also he reviewed her meds and noticed their is a interaction with lasix and amiodarone  Please advise Meghann Johnson [phone]    thanks

## 2021-05-10 ENCOUNTER — TRANSITIONAL CARE MANAGEMENT (OUTPATIENT)
Dept: FAMILY MEDICINE CLINIC | Facility: CLINIC | Age: 86
End: 2021-05-10

## 2021-05-10 ENCOUNTER — OFFICE VISIT (OUTPATIENT)
Dept: FAMILY MEDICINE CLINIC | Facility: CLINIC | Age: 86
End: 2021-05-10
Payer: MEDICARE

## 2021-05-10 VITALS — BODY MASS INDEX: 24.75 KG/M2 | HEIGHT: 64 IN | WEIGHT: 145 LBS

## 2021-05-10 DIAGNOSIS — Z76.89 ENCOUNTER FOR SUPPORT AND COORDINATION OF TRANSITION OF CARE: Primary | ICD-10-CM

## 2021-05-10 DIAGNOSIS — I44.2 AV BLOCK, 3RD DEGREE (HCC): ICD-10-CM

## 2021-05-10 DIAGNOSIS — Z95.0 PACEMAKER: ICD-10-CM

## 2021-05-10 DIAGNOSIS — S72.001A CLOSED FRACTURE OF RIGHT HIP REQUIRING OPERATIVE REPAIR, INITIAL ENCOUNTER (HCC): ICD-10-CM

## 2021-05-10 DIAGNOSIS — I10 ESSENTIAL HYPERTENSION: ICD-10-CM

## 2021-05-10 PROBLEM — Z01.818 PREOPERATIVE CLEARANCE: Status: RESOLVED | Noted: 2020-02-20 | Resolved: 2021-05-10

## 2021-05-10 PROCEDURE — 99495 TRANSJ CARE MGMT MOD F2F 14D: CPT | Performed by: FAMILY MEDICINE

## 2021-05-10 RX ORDER — POLYETHYLENE GLYCOL 3350 17 G/17G
17 POWDER, FOR SOLUTION ORAL DAILY
COMMUNITY
End: 2022-03-28

## 2021-05-10 RX ORDER — CHOLECALCIFEROL (VITAMIN D3) 25 MCG
CAPSULE ORAL DAILY
COMMUNITY

## 2021-05-10 NOTE — PROGRESS NOTES
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)       Musculoskeletal and Integument    Closed fracture of right hip requiring operative repair [facility])       Other    Encounter for support and coordination of transition of care - Primary    Pacemaker             Reason for visit is [address]    Encounter provider Virgil Kraft MD       Provider located at [address] [place], [place] [id number]      Recent Visits  Date Type Provider Dept   05/06/21 Telephone [address] [facility]   05/04/21 Telephone Jeniffer Felipe LPN [address] [place] [address] Physicians   Showing recent visits within past 7 days and meeting all other requirements     Today's Visits  Date Type Provider Dept   05/10/21 Office Visit Virgil Kraft MD [facility]   Showing today's visits and meeting all other requirements     Future Appointments  No visits were found meeting these conditions  Showing future appointments within next 150 days and meeting all other requirements        After connecting through [employer], the patient was identified by name and date of birth  Malvin Hawkins was informed that this is a telemedicine visit and that the visit is being conducted through [address] [place] [address] Now and patient was informed that this is a secure, HIPAA-compliant platform  She agrees to proceed     My office door was closed  No one else was in the room  She acknowledged consent and understanding of privacy and security of the video platform  The patient has agreed to participate and understands they can discontinue the visit at any time  Patient is aware this is a billable service  Subjective:     Patient ID: Malvin Hawkins is a 80 y o  female      TRANSITION OF CARE    PATIENT IS S/P [facility] AND SUBSEQUENT [facility] ADMISSION FOR R HIP FRACTURE, R HIP REPAIR, VA BLOCK, AND PACEMAKER PLACEMENT    DATE OF DISCHARGE - 5/1/2021    REVIEWED HOSPITAL AND NENA COURSE  REVIEWED DISCHARGE INSTRUCTIONS AND MEDICATIONS    PATIENT FEELS WELL  MINIMAL DISCOMFORT  NO CONCERNS      Review of Systems   Constitutional: Negative for chills, fatigue and fever  HENT: Negative for congestion, ear discharge, ear pain, mouth sores, postnasal drip, sore throat and trouble swallowing  Eyes: Negative for pain, discharge and visual disturbance  Respiratory: Negative for cough, shortness of breath and wheezing  Cardiovascular: Negative for chest pain, palpitations and leg swelling  Gastrointestinal: Negative for abdominal distention, abdominal pain, blood in stool, diarrhea and nausea  Endocrine: Negative for polydipsia, polyphagia and polyuria  Genitourinary: Negative for dysuria, frequency, hematuria and urgency  Musculoskeletal: Positive for arthralgias and gait problem  Negative for joint swelling  Skin: Negative for pallor and rash  Neurological: Negative for dizziness, syncope, speech difficulty, weakness, light-headedness, numbness and headaches  Hematological: Negative for adenopathy  Psychiatric/Behavioral: Negative for behavioral problems, confusion and sleep disturbance  The patient is not nervous/anxious  Objective:    Vitals:    05/10/21 1046   Weight: 65 8 kg (145 lb)   Height: 5' 4" (1 626 m)       Physical Exam      PATIENT VISUALLY APPEARS  IN NO OBVIOUS DISTRESS        Transitional Care Management Review:  Elva Sarmiento is a 80 y o  female here for TCM follow up       During the TCM phone call patient stated:    TCM Call (since 4/9/2021)     Date and time call was made  5/4/2021  8:32 AM    Hospital care reviewed  Records reviewed    Patient was hospitialized at  Other (comment)        Comment  [facility]    Date of Admission  04/14/21    Date of discharge  04/20/21    Diagnosis  Right Hop hemiarthroplasty 4/16/21    Disposition  Home    Were the patients medications reviewed and updated  Yes    Current Symptoms  None      TCM Call (since 4/9/2021)     Post hospital issues  None    Should patient be enrolled in anticoag monitoring? Yes    Patients specialists  Cardiologist; Urologist; Other (comment)    Cardiologist name  Ne Huertas    Cardiologist contact #  [phone]    Urologist name  Dr Yao Vazquez    Urologist contact #  [phone]    Other specialists names  Orthopaedist    Other specialists contcat #  Dr Tomás Gonsalez    Referrals needed  [phone]    Did you obtain your prescribed medications  Yes    Do you need help managing your prescriptions or medications  No    Is transportation to your appointment needed  Yes    Specify why  [facility] Left)    I have advised the patient to call PCP with any new or worsening symptoms  [address]  Family; Children    The type of support provided  Physical; Emotional; Financial    Do you have social support  No, not at all    Are you recieving any outpatient services  No    Are you recieving home care services  Yes    Types of home care services  Nurse visit    Are you using any community resources  No    Interperter language line needed  No          I spent 25 minutes with the patient during this visit      Sandra Garg MD

## 2021-05-18 ENCOUNTER — IMMUNIZATIONS (OUTPATIENT)
Dept: FAMILY MEDICINE CLINIC | Facility: HOSPITAL | Age: 86
End: 2021-05-18

## 2021-05-18 DIAGNOSIS — Z23 ENCOUNTER FOR IMMUNIZATION: Primary | ICD-10-CM

## 2021-05-18 PROCEDURE — 91301 SARS-COV-2 / COVID-19 MRNA VACCINE (MODERNA) 100 MCG: CPT

## 2021-05-18 PROCEDURE — 0012A SARS-COV-2 / COVID-19 MRNA VACCINE (MODERNA) 100 MCG: CPT

## 2021-05-27 DIAGNOSIS — I10 ESSENTIAL HYPERTENSION: ICD-10-CM

## 2021-05-27 DIAGNOSIS — I44.2 AV BLOCK, 3RD DEGREE (HCC): Primary | ICD-10-CM

## 2021-05-27 RX ORDER — AMIODARONE HYDROCHLORIDE 200 MG/1
200 TABLET ORAL DAILY
Qty: 30 TABLET | Refills: 1 | Status: SHIPPED | OUTPATIENT
Start: 2021-05-27 | End: 2021-06-22 | Stop reason: SDUPTHER

## 2021-05-27 RX ORDER — LOSARTAN POTASSIUM 25 MG/1
25 TABLET ORAL DAILY
Qty: 30 TABLET | Refills: 0 | Status: SHIPPED | OUTPATIENT
Start: 2021-05-27 | End: 2021-06-22 | Stop reason: SDUPTHER

## 2021-05-27 RX ORDER — FUROSEMIDE 20 MG/1
20 TABLET ORAL DAILY
Qty: 30 TABLET | Refills: 0 | Status: SHIPPED | OUTPATIENT
Start: 2021-05-27 | End: 2021-06-22 | Stop reason: SDUPTHER

## 2021-05-27 RX ORDER — APIXABAN 5 MG/1
5 TABLET, FILM COATED ORAL 2 TIMES DAILY
Qty: 60 TABLET | Refills: 0 | Status: SHIPPED | OUTPATIENT
Start: 2021-05-27 | End: 2021-06-22 | Stop reason: SDUPTHER

## 2021-05-27 RX ORDER — METOPROLOL SUCCINATE 50 MG/1
50 TABLET, EXTENDED RELEASE ORAL EVERY EVENING
Qty: 30 TABLET | Refills: 0 | Status: SHIPPED | OUTPATIENT
Start: 2021-05-27 | End: 2021-08-23 | Stop reason: SDUPTHER

## 2021-06-07 ENCOUNTER — TELEPHONE (OUTPATIENT)
Dept: FAMILY MEDICINE CLINIC | Facility: CLINIC | Age: 86
End: 2021-06-07

## 2021-06-07 NOTE — TELEPHONE ENCOUNTER
Wanted to call with her update    5/7 Saw Dr Misty Telles looked good  5/28 Walking well with her cane and doing well  7/9 She has a follow up appt with Dr Oralia Smith    Cardiology Dr Maite Martinez  5/12 he checked pacemaker   6/4 Had echo  6/11 will have follow up    Received her second covid shot

## 2021-06-22 DIAGNOSIS — I44.2 AV BLOCK, 3RD DEGREE (HCC): ICD-10-CM

## 2021-06-22 DIAGNOSIS — I10 ESSENTIAL HYPERTENSION: ICD-10-CM

## 2021-06-22 RX ORDER — AMIODARONE HYDROCHLORIDE 200 MG/1
200 TABLET ORAL DAILY
Qty: 30 TABLET | Refills: 1 | Status: SHIPPED | OUTPATIENT
Start: 2021-06-22 | End: 2021-08-25 | Stop reason: SDUPTHER

## 2021-06-22 RX ORDER — APIXABAN 5 MG/1
5 TABLET, FILM COATED ORAL 2 TIMES DAILY
Qty: 60 TABLET | Refills: 0 | Status: SHIPPED | OUTPATIENT
Start: 2021-06-22 | End: 2021-07-26 | Stop reason: SDUPTHER

## 2021-06-22 RX ORDER — LOSARTAN POTASSIUM 25 MG/1
25 TABLET ORAL DAILY
Qty: 30 TABLET | Refills: 0 | Status: SHIPPED | OUTPATIENT
Start: 2021-06-22 | End: 2021-07-23 | Stop reason: SDUPTHER

## 2021-06-22 RX ORDER — FUROSEMIDE 20 MG/1
20 TABLET ORAL DAILY
Qty: 30 TABLET | Refills: 0 | Status: SHIPPED | OUTPATIENT
Start: 2021-06-22 | End: 2021-07-23 | Stop reason: SDUPTHER

## 2021-07-12 ENCOUNTER — TELEPHONE (OUTPATIENT)
Dept: FAMILY MEDICINE CLINIC | Facility: CLINIC | Age: 86
End: 2021-07-12

## 2021-07-12 NOTE — TELEPHONE ENCOUNTER
[facility] is on lasix from when she had her hip surgery  She was wondering if she can stop taking the lasix  She is feeling well and is getting around

## 2021-07-14 NOTE — TELEPHONE ENCOUNTER
Marcus Oreilly called and stated that her leg did start to swell, so she is going to continue to take the lasix    Her bp has been fine though

## 2021-07-23 DIAGNOSIS — I10 ESSENTIAL HYPERTENSION: ICD-10-CM

## 2021-07-23 RX ORDER — LOSARTAN POTASSIUM 25 MG/1
25 TABLET ORAL DAILY
Qty: 30 TABLET | Refills: 0 | Status: SHIPPED | OUTPATIENT
Start: 2021-07-23 | End: 2021-08-23 | Stop reason: SDUPTHER

## 2021-07-23 RX ORDER — FUROSEMIDE 20 MG/1
20 TABLET ORAL DAILY
Qty: 30 TABLET | Refills: 0 | Status: SHIPPED | OUTPATIENT
Start: 2021-07-23 | End: 2021-08-23 | Stop reason: SDUPTHER

## 2021-07-23 NOTE — TELEPHONE ENCOUNTER
Requested medication(s) are due for refill today: Yes  Patient has already received a courtesy refill: No  Other reason request has been forwarded to provider:    No BP completed in the last 6 months     K in normal range and within 360 days     Na in normal range and within 360 days     Cr in normal range and within 360 days

## 2021-07-26 DIAGNOSIS — I44.2 AV BLOCK, 3RD DEGREE (HCC): ICD-10-CM

## 2021-07-26 RX ORDER — APIXABAN 5 MG/1
5 TABLET, FILM COATED ORAL 2 TIMES DAILY
Qty: 60 TABLET | Refills: 0 | Status: SHIPPED | OUTPATIENT
Start: 2021-07-26 | End: 2021-08-23 | Stop reason: SDUPTHER

## 2021-08-23 DIAGNOSIS — I44.2 AV BLOCK, 3RD DEGREE (HCC): ICD-10-CM

## 2021-08-23 DIAGNOSIS — I10 ESSENTIAL HYPERTENSION: ICD-10-CM

## 2021-08-23 RX ORDER — LOSARTAN POTASSIUM 25 MG/1
25 TABLET ORAL DAILY
Qty: 30 TABLET | Refills: 0 | Status: SHIPPED | OUTPATIENT
Start: 2021-08-23 | End: 2021-09-23 | Stop reason: SDUPTHER

## 2021-08-23 RX ORDER — APIXABAN 5 MG/1
5 TABLET, FILM COATED ORAL 2 TIMES DAILY
Qty: 60 TABLET | Refills: 0 | Status: SHIPPED | OUTPATIENT
Start: 2021-08-23 | End: 2021-09-27 | Stop reason: SDUPTHER

## 2021-08-23 RX ORDER — FUROSEMIDE 20 MG/1
20 TABLET ORAL DAILY
Qty: 30 TABLET | Refills: 0 | Status: SHIPPED | OUTPATIENT
Start: 2021-08-23 | End: 2021-09-28 | Stop reason: SDUPTHER

## 2021-08-23 RX ORDER — METOPROLOL SUCCINATE 50 MG/1
50 TABLET, EXTENDED RELEASE ORAL EVERY EVENING
Qty: 30 TABLET | Refills: 0 | Status: SHIPPED | OUTPATIENT
Start: 2021-08-23 | End: 2021-09-28 | Stop reason: SDUPTHER

## 2021-08-25 DIAGNOSIS — I44.2 AV BLOCK, 3RD DEGREE (HCC): ICD-10-CM

## 2021-08-25 RX ORDER — AMIODARONE HYDROCHLORIDE 200 MG/1
200 TABLET ORAL DAILY
Qty: 90 TABLET | Refills: 3 | Status: SHIPPED | OUTPATIENT
Start: 2021-08-25

## 2021-09-17 ENCOUNTER — TELEPHONE (OUTPATIENT)
Dept: FAMILY MEDICINE CLINIC | Facility: CLINIC | Age: 86
End: 2021-09-17

## 2021-09-23 DIAGNOSIS — I10 ESSENTIAL HYPERTENSION: ICD-10-CM

## 2021-09-23 RX ORDER — LOSARTAN POTASSIUM 25 MG/1
25 TABLET ORAL DAILY
Qty: 90 TABLET | Refills: 2 | Status: SHIPPED | OUTPATIENT
Start: 2021-09-23 | End: 2022-03-28

## 2021-09-27 DIAGNOSIS — I44.2 AV BLOCK, 3RD DEGREE (HCC): ICD-10-CM

## 2021-09-27 RX ORDER — APIXABAN 5 MG/1
5 TABLET, FILM COATED ORAL 2 TIMES DAILY
Qty: 60 TABLET | Refills: 0 | Status: SHIPPED | OUTPATIENT
Start: 2021-09-27 | End: 2021-10-25 | Stop reason: SDUPTHER

## 2021-09-28 DIAGNOSIS — I10 ESSENTIAL HYPERTENSION: ICD-10-CM

## 2021-09-28 RX ORDER — METOPROLOL SUCCINATE 50 MG/1
50 TABLET, EXTENDED RELEASE ORAL EVERY EVENING
Qty: 90 TABLET | Refills: 0 | Status: SHIPPED | OUTPATIENT
Start: 2021-09-28 | End: 2021-12-20 | Stop reason: SDUPTHER

## 2021-09-28 RX ORDER — FUROSEMIDE 20 MG/1
20 TABLET ORAL DAILY
Qty: 90 TABLET | Refills: 0 | Status: SHIPPED | OUTPATIENT
Start: 2021-09-28 | End: 2022-03-28

## 2021-10-25 DIAGNOSIS — I44.2 AV BLOCK, 3RD DEGREE (HCC): ICD-10-CM

## 2021-10-25 RX ORDER — APIXABAN 5 MG/1
5 TABLET, FILM COATED ORAL 2 TIMES DAILY
Qty: 60 TABLET | Refills: 0 | Status: SHIPPED | OUTPATIENT
Start: 2021-10-25 | End: 2021-11-22 | Stop reason: SDUPTHER

## 2021-11-22 DIAGNOSIS — I44.2 AV BLOCK, 3RD DEGREE (HCC): ICD-10-CM

## 2021-11-22 RX ORDER — APIXABAN 5 MG/1
5 TABLET, FILM COATED ORAL 2 TIMES DAILY
Qty: 60 TABLET | Refills: 0 | Status: SHIPPED | OUTPATIENT
Start: 2021-11-22 | End: 2021-12-20 | Stop reason: SDUPTHER

## 2021-12-20 DIAGNOSIS — I44.2 AV BLOCK, 3RD DEGREE (HCC): ICD-10-CM

## 2021-12-20 DIAGNOSIS — I10 ESSENTIAL HYPERTENSION: ICD-10-CM

## 2021-12-20 RX ORDER — LOSARTAN POTASSIUM 25 MG/1
25 TABLET ORAL DAILY
Qty: 90 TABLET | Refills: 2 | OUTPATIENT
Start: 2021-12-20

## 2021-12-20 RX ORDER — APIXABAN 5 MG/1
5 TABLET, FILM COATED ORAL 2 TIMES DAILY
Qty: 60 TABLET | Refills: 2 | Status: SHIPPED | OUTPATIENT
Start: 2021-12-20 | End: 2022-03-21 | Stop reason: SDUPTHER

## 2021-12-20 RX ORDER — METOPROLOL SUCCINATE 50 MG/1
50 TABLET, EXTENDED RELEASE ORAL EVERY EVENING
Qty: 90 TABLET | Refills: 0 | Status: SHIPPED | OUTPATIENT
Start: 2021-12-20 | End: 2022-03-28 | Stop reason: ALTCHOICE

## 2022-03-21 DIAGNOSIS — I44.2 AV BLOCK, 3RD DEGREE (HCC): ICD-10-CM

## 2022-03-21 RX ORDER — APIXABAN 5 MG/1
5 TABLET, FILM COATED ORAL 2 TIMES DAILY
Qty: 60 TABLET | Refills: 2 | Status: SHIPPED | OUTPATIENT
Start: 2022-03-21

## 2022-03-24 RX ORDER — CLINDAMYCIN HYDROCHLORIDE 300 MG/1
CAPSULE ORAL
COMMUNITY
Start: 2021-10-28 | End: 2022-03-28

## 2022-03-24 RX ORDER — SENNOSIDES 8.6 MG
TABLET ORAL DAILY
COMMUNITY
End: 2022-03-28

## 2022-03-24 RX ORDER — SACCHAROMYCES BOULARDII 250 MG
CAPSULE ORAL
COMMUNITY
End: 2022-03-28

## 2022-03-24 RX ORDER — CARVEDILOL 12.5 MG/1
1 TABLET ORAL 2 TIMES DAILY WITH MEALS
COMMUNITY
Start: 2022-02-11 | End: 2022-03-28

## 2022-03-24 RX ORDER — CEPHALEXIN 500 MG/1
TABLET ORAL EVERY 12 HOURS
COMMUNITY
End: 2022-03-28

## 2022-03-28 ENCOUNTER — TELEMEDICINE (OUTPATIENT)
Dept: FAMILY MEDICINE CLINIC | Facility: CLINIC | Age: 87
End: 2022-03-28
Payer: MEDICARE

## 2022-03-28 VITALS — HEIGHT: 64 IN | BODY MASS INDEX: 24.75 KG/M2 | WEIGHT: 145 LBS

## 2022-03-28 DIAGNOSIS — I44.2 AV BLOCK, 3RD DEGREE (HCC): ICD-10-CM

## 2022-03-28 DIAGNOSIS — S72.001A CLOSED FRACTURE OF RIGHT HIP REQUIRING OPERATIVE REPAIR, INITIAL ENCOUNTER (HCC): ICD-10-CM

## 2022-03-28 DIAGNOSIS — I10 PRIMARY HYPERTENSION: Primary | ICD-10-CM

## 2022-03-28 DIAGNOSIS — Z95.0 PACEMAKER: ICD-10-CM

## 2022-03-28 DIAGNOSIS — I48.92 PAROXYSMAL ATRIAL FLUTTER (HCC): ICD-10-CM

## 2022-03-28 DIAGNOSIS — C50.919 PRIMARY MALIGNANT NEOPLASM OF FEMALE BREAST (HCC): ICD-10-CM

## 2022-03-28 PROBLEM — Z76.89 ENCOUNTER FOR SUPPORT AND COORDINATION OF TRANSITION OF CARE: Status: RESOLVED | Noted: 2021-05-10 | Resolved: 2022-03-28

## 2022-03-28 PROCEDURE — 99214 OFFICE O/P EST MOD 30 MIN: CPT | Performed by: FAMILY MEDICINE

## 2022-03-28 RX ORDER — LOSARTAN POTASSIUM 50 MG/1
TABLET ORAL
COMMUNITY
Start: 2022-03-24 | End: 2022-03-28

## 2022-03-28 RX ORDER — CARVEDILOL 25 MG/1
TABLET ORAL
COMMUNITY
Start: 2022-03-28

## 2022-03-28 NOTE — PROGRESS NOTES
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)     STABLE  S/P PACEMAKER         Relevant Medications    carvedilol (COREG) 25 mg tablet    Paroxysmal atrial flutter (HCC)     STABLE  FOLLOWED BY CARDIOLOGY           Relevant Medications    carvedilol (COREG) 25 mg tablet       Musculoskeletal and Integument    Closed fracture of right hip requiring operative repair ([facility] )     DOING WELL            Other    Pacemaker    Primary malignant ne[facility] )            Depression Screening and Follow-up Plan: Patient was screened for depression during today's encounter  They screened negative with a PHQ-2 score of 0  Reason for visit is   Chief Complaint   Patient presents with    Medication Management    Virtual Regular Visit        Encounter provider Ashely Matthews MD    Provider located at [address] [place] [id number]      Recent Visits  No visits were found meeting these conditions  Showing recent visits within past 7 days and meeting all other requirements  Today's Visits  Date Type Provider Dept   03/28/22 Telemedicine Ashely Matthews MD [facility]   Showing today's visits and meeting all other requirements  Future Appointments  No visits were found meeting these conditions    Showing future appointments within next 150 days and meeting all other requirements       The patient was identified by name and date of birth  Joanne Pickens was informed that this is a telemedicine visit and that the visit is being conducted through [address] Now and patient was informed that this is a secure, HIPAA-compliant platform  She agrees to proceed     My office door was closed  No one else was in the room  She acknowledged consent and understanding of privacy and security of the video platform  The patient has agreed to participate and understands they can discontinue the visit at any time  Patient is aware this is a billable service  Subjective  Joanne Pickens is a 80 y o  female      PATIENT RETURNS FOR ROUTINE EVALUATION OF PATIENT'S MEDICAL ISSUES    INDIVIDUAL MEDICAL ISSUES WITH THEIR CURRENT STATUS, ASSESSMENT AND PLANS ARE LISTED ABOVE           Past Medical History:   Diagnosis Date    Essential hypertension     LAST ASSESSED: 4/27/15    Malignant neoplasm of breast ([facility] )     RIGHT; LAST ASSESSED: 4/27/15    Osteoporosis     LAST ASSESSED: 4/27/15    Pacemaker 04/15/2021       Past Surgical History:   Procedure Laterality Date    BLADDER SURGERY      BREAST LUMPECTOMY      LAST ASSESSED: 4/27/15    HIP SURGERY Right 04/16/2021       Current Outpatient Medications   Medication Sig Dispense Refill    amiodarone 200 mg tablet Take 1 tablet (200 mg total) by mouth daily 90 tablet 3    carvedilol (COREG) 25 mg tablet       Cholecalciferol (Vitamin D-3) 25 MCG (1000 UT) CAPS Take by mouth daily      Eliquis 5 MG Take 1 tablet (5 mg total) by mouth 2 (two) times a day 60 tablet 2     No current facility-administered medications for this visit  Allergies   Allergen Reactions    Penicillins Hives    Sulfa Antibiotics        Review of Systems   Constitutional: Negative for chills, fatigue and fever  HENT: Negative for congestion, ear discharge, ear pain, mouth sores, postnasal drip, sore throat and trouble swallowing      Eyes: Negative for pain, discharge and visual disturbance  Respiratory: Negative for cough, shortness of breath and wheezing  Cardiovascular: Negative for chest pain, palpitations and leg swelling  Gastrointestinal: Negative for abdominal distention, abdominal pain, blood in stool, diarrhea and nausea  Endocrine: Negative for polydipsia, polyphagia and polyuria  Genitourinary: Negative for dysuria, frequency, hematuria and urgency  Musculoskeletal: Positive for arthralgias, back pain and gait problem  Negative for joint swelling  Skin: Negative for pallor and rash  Neurological: Negative for dizziness, syncope, speech difficulty, weakness, light-headedness, numbness and headaches  Hematological: Negative for adenopathy  Psychiatric/Behavioral: Negative for behavioral problems, confusion and sleep disturbance  The patient is not nervous/anxious  Video Exam    Vitals:    03/28/22 1057   Weight: 65 8 kg (145 lb)   Height: 5' 4" (1 626 m)       Physical Exam     PATIENT VISUALLY APPEARS WELL IN NO OBVIOUS DISTRESS    I spent 20 minutes directly with the patient during this visit    VIRTUAL VISIT DISCLAIMER      Malvin Hawkins verbally agrees to participate in [employer]  Pt is aware that [employer] could be limited without vital signs or the ability to perform a full hands-on physical Ligia Melendez understands she or the provider may request at any time to terminate the video visit and request the patient to seek care or treatment in person

## 2022-04-21 ENCOUNTER — TELEPHONE (OUTPATIENT)
Dept: FAMILY MEDICINE CLINIC | Facility: CLINIC | Age: 87
End: 2022-04-21

## 2022-04-21 NOTE — TELEPHONE ENCOUNTER
We received a request from [facility] renewing Furosemide 20mg 1 daily, but I only see Eliquis in her chart  Please send rx for whichever she is supposed to be taking    [facility]

## 2022-07-22 ENCOUNTER — TELEPHONE (OUTPATIENT)
Dept: FAMILY MEDICINE CLINIC | Facility: CLINIC | Age: 87
End: 2022-07-22

## 2022-07-22 NOTE — TELEPHONE ENCOUNTER
Kathie Anthony states her daughter passed away and Kathie Anthony would like to speak to a beraevment counselor  Kathie Anthony asked who you recommend

## 2022-11-11 DIAGNOSIS — Z00.00 MEDICARE ANNUAL WELLNESS VISIT, SUBSEQUENT: Primary | ICD-10-CM

## 2022-11-11 DIAGNOSIS — I10 PRIMARY HYPERTENSION: ICD-10-CM

## 2022-11-11 DIAGNOSIS — I10 ESSENTIAL HYPERTENSION: ICD-10-CM

## 2022-11-11 DIAGNOSIS — Z13.220 SCREENING CHOLESTEROL LEVEL: ICD-10-CM

## 2022-12-08 ENCOUNTER — RA CDI HCC (OUTPATIENT)
Dept: OTHER | Facility: HOSPITAL | Age: 87
End: 2022-12-08

## 2022-12-08 NOTE — PROGRESS NOTES
[facility] [address]  coding opportunities       Chart reviewed, no opportunity found: CHART REVIEWED, NO OPPORTUNITY FOUND        Patients Insurance     Medicare Insurance: Medicare

## 2022-12-14 RX ORDER — RIVAROXABAN 15 MG/1
TABLET, FILM COATED ORAL
COMMUNITY
Start: 2022-09-09

## 2022-12-14 RX ORDER — LOSARTAN POTASSIUM 50 MG/1
TABLET ORAL
COMMUNITY
Start: 2022-10-19

## 2022-12-15 ENCOUNTER — OFFICE VISIT (OUTPATIENT)
Dept: FAMILY MEDICINE CLINIC | Facility: CLINIC | Age: 87
End: 2022-12-15

## 2022-12-15 VITALS
HEIGHT: 64 IN | RESPIRATION RATE: 12 BRPM | OXYGEN SATURATION: 97 % | BODY MASS INDEX: 23.73 KG/M2 | DIASTOLIC BLOOD PRESSURE: 80 MMHG | SYSTOLIC BLOOD PRESSURE: 138 MMHG | TEMPERATURE: 98.4 F | WEIGHT: 139 LBS | HEART RATE: 65 BPM

## 2022-12-15 DIAGNOSIS — N18.31 STAGE 3A CHRONIC KIDNEY DISEASE (HCC): ICD-10-CM

## 2022-12-15 DIAGNOSIS — I10 PRIMARY HYPERTENSION: Primary | ICD-10-CM

## 2022-12-15 DIAGNOSIS — I44.2 AV BLOCK, 3RD DEGREE (HCC): ICD-10-CM

## 2022-12-15 DIAGNOSIS — C50.919 PRIMARY MALIGNANT NEOPLASM OF FEMALE BREAST (HCC): ICD-10-CM

## 2022-12-15 DIAGNOSIS — I48.92 PAROXYSMAL ATRIAL FLUTTER (HCC): ICD-10-CM

## 2022-12-15 DIAGNOSIS — Z00.00 MEDICARE ANNUAL WELLNESS VISIT, SUBSEQUENT: ICD-10-CM

## 2022-12-15 NOTE — PROGRESS NOTES
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)    Relevant Medications    Xarelto 15 MG tablet    Paroxysmal atrial flutter (HCC)     STABLE  FOLLOWED BY CARDIOLOGY            Genitourinary    Stage 3a chronic kidney disease (HCC)       Other    Primary malignant neoplasm of female breast ([facility] [address] )     IN REMISSION         Medicare annual wellness visit, subsequent       Depression Screening and Follow-up Plan: Patient was screened for depression during today's encounter  They screened negative with a PHQ-2 score of 0  Preventive health issues were discussed with patient, and age appropriate screening tests were ordered as noted in patient's After Visit Summary  Personalized health advice and appropriate referrals for health education or preventive services given if needed, as noted in patient's After Visit Summary  History of Present Illness:     Patient presents for a Medicare Wellness Visit    PATIENT RETURNS FOR ANNUAL WELLNESS EXAM AND ANNUAL EVALUATION OF PATIENT'S MEDICAL ISSUES    INDIVIDUAL MEDICAL ISSUES WITH THEIR CURRENT STATUS, ASSESSMENT AND PLANS ARE LISTED ABOVE         Patient Care Team:  Pau Negron MD as PCP - Marivel Hines MD (Oncology)     Review of Systems:     Review of Systems   Constitutional: Negative for chills, fatigue and fever  HENT: Negative for congestion, ear discharge, ear pain, mouth sores, postnasal drip, sore throat and trouble swallowing  Eyes: Negative for pain, discharge and visual disturbance     Respiratory: Negative for cough, shortness of breath and wheezing  Cardiovascular: Negative for chest pain, palpitations and leg swelling  Gastrointestinal: Negative for abdominal distention, abdominal pain, blood in stool, diarrhea and nausea  Endocrine: Negative for polydipsia, polyphagia and polyuria  Genitourinary: Negative for dysuria, frequency, hematuria and urgency  Musculoskeletal: Negative for arthralgias, gait problem and joint swelling  Skin: Negative for pallor and rash  Neurological: Negative for dizziness, syncope, speech difficulty, weakness, light-headedness, numbness and headaches  Hematological: Negative for adenopathy  Psychiatric/Behavioral: Negative for behavioral problems, confusion and sleep disturbance  The patient is not nervous/anxious           Problem List:     Patient Active Problem List   Diagnosis   • Hypertension   • Chronic right shoulder pain   • History of breast cancer   • Cortical age-related cataract of both eyes   • Closed fracture of right hip requiring operative repair (HCC)   • AV block, 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)   • Pacemaker   • Paroxysmal atrial flutter (HCC)   • Primary malignant neoplasm of female breast ([facility] [address] )   • Stage 3a chronic kidney disease ([facility] [address] )   • Medicare annual wellness visit, subsequent      Past Medical and Surgical History:     Past Medical History:   Diagnosis Date   • Essential hypertension     LAST ASSESSED: 4/27/15   • Malignant neoplasm of breast ([facility] [address] )     RIGHT; LAST ASSESSED: 4/27/15   • Osteoporosis     LAST ASSESSED: 4/27/15   • Pacemaker 04/15/2021     Past Surgical History:   Procedure Laterality Date   • BLADDER SURGERY     • BREAST LUMPECTOMY      LAST ASSESSED: 4/27/15   • HIP SURGERY Right 04/16/2021      Family History:     Family History   Problem Relation Age of Onset   • Stroke Mother         CEREBROVASCULAR ACCIDENT   • Hypertension Mother    • Ovarian cancer Mother    • Pancreatic cancer Mother         PANCREAS CARCINOMA   • Pancreatic cancer Brother PANCREAS CARCINOMA   • Bipolar disorder Other    • Substance Abuse Neg Hx    • Mental illness Neg Hx       Social History:     Social History     Socioeconomic History   • Marital status:      Spouse name: None   • Number of children: None   • Years of education: None   • Highest education level: None   Occupational History   • None   Tobacco Use   • Smoking status: Never   • Smokeless tobacco: Never   Vaping Use   • Vaping Use: Never used   Substance and Sexual Activity   • Alcohol use: No     Comment: ALCOHOL USE: WINE ON OCCASION, OCCASIONAL ALCOHOL USE AS PER [employer]SCRIPTS   • Drug use: No   • Sexual activity: None   Other Topics Concern   • None   Social History Narrative    CAFFEINE USE    DENTAL CARE, REGULARLY    DRINKS COFFEE    TEA     Social Determinants of Health     Financial Resource Strain: Low Risk    • Difficulty of Paying Living Expenses: Not hard at all   Food Insecurity: Not on file   Transportation Needs: No Transportation Needs   • Lack of Transportation (Medical): No   • Lack of Transportation (Non-Medical): No   Physical Activity: Not on file   Stress: Not on file   Social Connections: Not on file   Intimate Partner Violence: Not on file   Housing Stability: Not on file      Medications and Allergies:     Current Outpatient Medications   Medication Sig Dispense Refill   • amiodarone 200 mg tablet Take 1 tablet (200 mg total) by mouth daily 90 tablet 3   • carvedilol (COREG) 25 mg tablet      • Cholecalciferol (Vitamin D-3) 25 MCG (1000 UT) CAPS Take by mouth daily     • losartan (COZAAR) 50 mg tablet      • Xarelto 15 MG tablet        No current facility-administered medications for this visit       Allergies   Allergen Reactions   • Penicillins Hives   • Sulfa Antibiotics       Immunizations:     Immunization History   Administered Date(s) Administered   • COVID-19 MODERNA VACC 0 5 ML IM 03/12/2021, 05/18/2021   • INFLUENZA 09/15/2019   • Influenza Quadrivalent 3 years and older 10/02/2017   • Influenza Quadrivalent Preservative Free 3 years and older IM 10/22/2015   • Influenza Quadrivalent, 6-35 Months IM 10/22/2015   • Influenza Split High Dose Preservative Free IM 11/01/2017   • Influenza, high dose seasonal 0 7 mL 10/11/2018   • Influenza, seasonal, injectable 11/21/2014, 11/09/2016   • Pneumococcal Conjugate 13-Valent 05/17/2016   • Pneumococcal Polysaccharide PPV23 10/11/2018      Health Maintenance:         Topic Date Due   • Breast Cancer Screening: Mammogram  12/13/2020         Topic Date Due   • Hepatitis B Vaccine (1 of 3 - 3-dose series) Never done   • COVID-19 Vaccine (3 - Booster for Moderna series) 10/18/2021   • Influenza Vaccine (1) 09/01/2022      Medicare Screening Tests and Risk Assessments:         Health Risk Assessment:   Patient rates overall health as very good  Patient feels that their physical health rating is same  Patient is very satisfied with their life  Eyesight was rated as same  Hearing was rated as same  Patient feels that their emotional and mental health rating is same  Patients states they are never, rarely angry  Patient states they are never, rarely unusually tired/fatigued  Pain experienced in the last 7 days has been none  Patient states that she has experienced no weight loss or gain in last 6 months  Depression Screening:   PHQ-2 Score: 0      Fall Risk Screening: In the past year, patient has experienced: no history of falling in past year      Urinary Incontinence Screening:   Patient has not leaked urine accidently in the last six months  Home Safety:  Patient does not have trouble with stairs inside or outside of their home  Patient has working smoke alarms and has working carbon monoxide detector  Home safety hazards include: none  Nutrition:   Current diet is Regular  Medications:   Patient is not currently taking any over-the-counter supplements  Patient is able to manage medications       Activities of Daily Living (ADLs)/Instrumental Activities of Daily Living (IADLs):   Walk and transfer into and out of bed and chair?: Yes  Dress and groom yourself?: Yes    Bathe or shower yourself?: Yes    Feed yourself? Yes  Do your laundry/housekeeping?: Yes  Manage your money, pay your bills and track your expenses?: Yes  Make your own meals?: Yes    Do your own shopping?: Yes    Previous Hospitalizations:   Any hospitalizations or ED visits within the last 12 months?: No      Advance Care Planning:   Living will: Yes    Durable POA for healthcare: Yes    Advanced directive: Yes    Provider agrees with end of life decisions: Yes      PREVENTIVE SCREENINGS      Cardiovascular Screening:    General: Screening Current      Diabetes Screening:     General: Screening Current      Colorectal Cancer Screening:     General: Screening Not Indicated      Breast Cancer Screening:     General: History Breast Cancer      Cervical Cancer Screening:    General: Screening Not Indicated      Osteoporosis Screening:    General: Screening Not Indicated      Abdominal Aortic Aneurysm (AAA) Screening:        General: Screening Not Indicated      Lung Cancer Screening:     General: Screening Not Indicated      Hepatitis C Screening:    General: Screening Not Indicated    Screening, Brief Intervention, and Referral to Treatment (SBIRT)    Screening  Typical number of drinks in a day: 0  Typical number of drinks in a week: 0  Interpretation: Low risk drinking behavior  Single Item Drug Screening:  How often have you used an illegal drug (including marijuana) or a prescription medication for non-medical reasons in the past year? never    Single Item Drug Screen Score: 0  Interpretation: Negative screen for possible drug use disorder    No results found       Physical Exam:     /80   Pulse 65   Temp 98 4 °F (36 9 °C) (Temporal)   Resp 12   Ht 5' 4" (1 626 m)   Wt 63 kg (139 lb)   SpO2 97%   BMI 23 86 kg/m²     Physical Exam  Constitutional: General: She is not in acute distress  Appearance: Normal appearance  She is well-developed and normal weight  HENT:      Head: Normocephalic and atraumatic  Right Ear: Tympanic membrane and external ear normal       Left Ear: Tympanic membrane and external ear normal       Nose: Nose normal       Mouth/Throat:      Pharynx: No oropharyngeal exudate  Eyes:      General: No scleral icterus  Right eye: No discharge  Left eye: No discharge  Conjunctiva/sclera: Conjunctivae normal       Pupils: Pupils are equal, round, and reactive to light  Neck:      Thyroid: No thyromegaly  Trachea: No tracheal deviation  Cardiovascular:      Rate and Rhythm: Normal rate  Rhythm irregular  Heart sounds: Normal heart sounds  No murmur heard  No friction rub  No gallop  Pulmonary:      Effort: Pulmonary effort is normal  No respiratory distress  Breath sounds: Normal breath sounds  No wheezing or rales  Chest:      Chest wall: No tenderness  Abdominal:      General: Bowel sounds are normal  There is no distension  Palpations: Abdomen is soft  There is no mass  Tenderness: There is no abdominal tenderness  There is no guarding or rebound  Hernia: No hernia is present  Musculoskeletal:         General: No tenderness or deformity  Normal range of motion  Cervical back: Normal range of motion and neck supple  Comments: MILD DJD CHANGES     Lymphadenopathy:      Cervical: No cervical adenopathy  Skin:     General: Skin is warm and dry  Findings: No erythema or rash  Neurological:      General: No focal deficit present  Mental Status: She is alert and oriented to person, place, and time  Cranial Nerves: No cranial nerve deficit  Sensory: No sensory deficit  Motor: No weakness or abnormal muscle tone        Coordination: Coordination normal       Gait: Gait normal       Deep Tendon Reflexes: Reflexes normal    Psychiatric: Mood and Affect: Mood normal          Behavior: Behavior normal          Thought Content:  Thought content normal          Judgment: Judgment normal           Ema Christopher MD

## 2022-12-15 NOTE — PATIENT INSTRUCTIONS
DISCUSSED HEALTH ISSUES  HEALTHY DIET AND EXERCISE  BW WILL BE REVIEWED    RECOMMEND CALCIUM 3010-5290 MG DAILY  VITAMIN D3  1000 IU DAILY  RV IN 1 YEAR FOR ANNUAL EXAM, SOONER IF NEEDED    RV 6M      Medicare Preventive Visit Patient Instructions  Thank you for completing your Welcome to Medicare Visit or Medicare Annual Wellness Visit today  Your next wellness visit will be due in one year (12/16/2023)  The screening/preventive services that you may require over the next 5-10 years are detailed below  Some tests may not apply to you based off risk factors and/or age  Screening tests ordered at today's visit but not completed yet may show as past due  Also, please note that scanned in results may not display below  Preventive Screenings:  Service Recommendations Previous Testing/Comments   Colorectal Cancer Screening  * Colonoscopy    * Fecal Occult Blood Test (FOBT)/Fecal Immunochemical Test (FIT)  * Fecal DNA/Cologuard Test  * Flexible Sigmoidoscopy Age: 39-70 years old   Colonoscopy: every 10 years (may be performed more frequently if at higher risk)  OR  FOBT/FIT: every 1 year  OR  Cologuard: every 3 years  OR  Sigmoidoscopy: every 5 years  Screening may be recommended earlier than age 39 if at higher risk for colorectal cancer  Also, an individualized decision between you and your healthcare provider will decide whether screening between the ages of 74-80 would be appropriate  Colonoscopy: Not on file  FOBT/FIT: Not on file  Cologuard: Not on file  Sigmoidoscopy: Not on file    Screening Not Indicated     Breast Cancer Screening Age: 36 years old  Frequency: every 1-2 years  Not required if history of left and right mastectomy Mammogram: 12/13/2019    History Breast Cancer   Cervical Cancer Screening Between the ages of 21-29, pap smear recommended once every 3 years  Between the ages of 33-67, can perform pap smear with HPV co-testing every 5 years     Recommendations may differ for women with a history of total hysterectomy, cervical cancer, or abnormal pap smears in past  Pap Smear: Not on file    Screening Not Indicated   Hepatitis C Screening Once for adults born between 1945 and 1965  More frequently in patients at high risk for Hepatitis C Hep C Antibody: Not on file        Diabetes Screening 1-2 times per year if you're at risk for diabetes or have pre-diabetes Fasting glucose: No results in last 5 years (No results in last 5 years)  A1C: No results in last 5 years (No results in last 5 years)      Cholesterol Screening Once every 5 years if you don't have a lipid disorder  May order more often based on risk factors  Lipid panel: 10/11/2018    Screening Current     Other Preventive Screenings Covered by Medicare:  1  Abdominal Aortic Aneurysm (AAA) Screening: covered once if your at risk  You're considered to be at risk if you have a family history of AAA  2  Lung Cancer Screening: covers low dose CT scan once per year if you meet all of the following conditions: (1) Age 50-69; (2) No signs or symptoms of lung cancer; (3) Current smoker or have quit smoking within the last 15 years; (4) You have a tobacco smoking history of at least 20 pack years (packs per day multiplied by number of years you smoked); (5) You get a written order from a healthcare provider  3  Glaucoma Screening: covered annually if you're considered high risk: (1) You have diabetes OR (2) Family history of glaucoma OR (3)  aged 48 and older OR (3)  American aged 72 and older  3  Osteoporosis Screening: covered every 2 years if you meet one of the following conditions: (1) You're estrogen deficient and at risk for osteoporosis based off medical history and other findings; (2) Have a vertebral abnormality; (3) On glucocorticoid therapy for more than 3 months; (4) Have primary hyperparathyroidism; (5) On osteoporosis medications and need to assess response to drug therapy  · Last bone density test (DXA Scan):  Not on file  5  HIV Screening: covered annually if you're between the age of 12-76  Also covered annually if you are younger than 13 and older than 72 with risk factors for HIV infection  For pregnant patients, it is covered up to 3 times per pregnancy  Immunizations:  Immunization Recommendations   Influenza Vaccine Annual influenza vaccination during flu season is recommended for all persons aged >= 6 months who do not have contraindications   Pneumococcal Vaccine   * Pneumococcal conjugate vaccine = PCV13 (Prevnar 13), PCV15 (Vaxneuvance), PCV20 (Prevnar 20)  * Pneumococcal polysaccharide vaccine = PPSV23 (Pneumovax) Adults 25-60 years old: 1-3 doses may be recommended based on certain risk factors  Adults 72 years old: 1-2 doses may be recommended based off what pneumonia vaccine you previously received   Hepatitis B Vaccine 3 dose series if at intermediate or high risk (ex: diabetes, end stage renal disease, liver disease)   Tetanus (Td) Vaccine - COST NOT COVERED BY MEDICARE PART B Following completion of primary series, a booster dose should be given every 10 years to maintain immunity against tetanus  Td may also be given as tetanus wound prophylaxis  Tdap Vaccine - COST NOT COVERED BY MEDICARE PART B Recommended at least once for all adults  For pregnant patients, recommended with each pregnancy  Shingles Vaccine (Shingrix) - COST NOT COVERED BY MEDICARE PART B  2 shot series recommended in those aged 48 and above     Health Maintenance Due:      Topic Date Due   • Breast Cancer Screening: Mammogram  12/13/2020     Immunizations Due:      Topic Date Due   • Hepatitis B Vaccine (1 of 3 - 3-dose series) Never done   • COVID-19 Vaccine (3 - Booster for Moderna series) 10/18/2021   • Influenza Vaccine (1) 09/01/2022     Advance Directives   What are advance directives? Advance directives are legal documents that state your wishes and plans for medical care   These plans are made ahead of time in case you lose your ability to make decisions for yourself  Advance directives can apply to any medical decision, such as the treatments you want, and if you want to donate organs  What are the types of advance directives? There are many types of advance directives, and each state has rules about how to use them  You may choose a combination of any of the following:  · Living will: This is a written record of the treatment you want  You can also choose which treatments you do not want, which to limit, and which to stop at a certain time  This includes surgery, medicine, IV fluid, and tube feedings  · Durable power of  for healthcare [facility]): This is a written record that states who you want to make healthcare choices for you when you are unable to make them for yourself  This person, called a proxy, is usually a family member or a friend  You may choose more than 1 proxy  · Do not resuscitate (DNR) order:  A DNR order is used in case your heart stops beating or you stop breathing  It is a request not to have certain forms of treatment, such as CPR  A DNR order may be included in other types of advance directives  · Medical directive: This covers the care that you want if you are in a coma, near death, or unable to make decisions for yourself  You can list the treatments you want for each condition  Treatment may include pain medicine, surgery, blood transfusions, dialysis, IV or tube feedings, and a ventilator (breathing machine)  · Values history: This document has questions about your views, beliefs, and how you feel and think about life  This information can help others choose the care that you would choose  Why are advance directives important? An advance directive helps you control your care  Although spoken wishes may be used, it is better to have your wishes written down  Spoken wishes can be misunderstood, or not followed  Treatments may be given even if you do not want them   An advance directive may make it easier for your family to make difficult choices about your care  © Copyright [employer] 2018 Information is for End User's use only and may not be sold, redistributed or otherwise used for commercial purposes   All illustrations and images included in CareNotes® are the copyrighted property of A D A M , Inc  or [address] [place], [place] [address]benjamin negro

## 2022-12-16 ENCOUNTER — TELEPHONE (OUTPATIENT)
Dept: ADMINISTRATIVE | Facility: OTHER | Age: 87
End: 2022-12-16

## 2022-12-16 NOTE — LETTER
Vaccination Request Form: Influenza      Date Requested: 22  Patient: Vitaliy Agudelo  Patient : [id number]   Referring Provider: Loren Johns MD       The above patient has informed us that they have had their   most recent Influenza administered at your facility  Please   complete this form and attach all corresponding documentation  Date of Vaccine(s) Given  ______________________________    Lot Number(s) _______________________________________    Manufacture(s) ______________________________________    Dose Amount (s) _____________________________________    Expiration Date(s) ____________________________________    Comments __________________________________________________________  ____________________________________________________________________  ____________________________________________________________________  ____________________________________________________________________    Administering Facility  ________________________________________________    Vaccine Administered By (print name) ___________________________________      Form Completed By (print name) _______________________________________      Signature ___________________________________________________________      These reports are needed for  compliance  Please fax this completed form and a copy of the Vaccine Document(s) to our office located at [place] [address] as soon as possible to [phone] marybel Bennett: Phone [phone]    We thank you for your assistance in treating our mutual patient     Rosalio - [phone] F [phone]

## 2022-12-16 NOTE — TELEPHONE ENCOUNTER
----- Message from Quentin Pittman sent at 12/15/2022  3:17 PM EST -----  Regarding: CARE GAP REQUST - Immunization  12/15/22 3:17 PM    Hello, our patient attached above has had Flu Immunization(s) completed/performed  Please assist in updating the patient chart by making an External outreach to [employer] [facility] facility located in [place], [place]  The date of service is Fall 2022      Thank you,  Alexis Zheng, CMA  [address]

## 2022-12-20 NOTE — TELEPHONE ENCOUNTER
Upon review of the In Basket request and the patient's chart, initial outreach has been made via fax to facility  Please see Contacts section for details       Thank you  Alex Dela Cruz

## 2022-12-27 NOTE — TELEPHONE ENCOUNTER
Upon review of the In Basket request we were able to locate, review, and update the patient chart as requested for Immunization(s) Flu  Any additional questions or concerns should be emailed to the Practice Liaisons via the appropriate education email address, please do not reply via In Basket      Thank you  Nasir Mars

## 2023-01-09 DIAGNOSIS — I44.2 AV BLOCK, 3RD DEGREE (HCC): ICD-10-CM

## 2023-01-10 NOTE — TELEPHONE ENCOUNTER
Requested medication(s) are due for refill today: Yes  Patient has already received a courtesy refill: No  Other reason request has been forwarded to provider:    PFT completed within the last 12 months    This refill cannot be delegated    TSH in normal range and within 180 days    Mg Level in normal range and within 180 days    K in normal range and within 180 days    Ca in normal range and within 180 days    AST in normal range and within 180 days    ALT in normal range and within 180 days    ECG within 180 days

## 2023-01-11 RX ORDER — AMIODARONE HYDROCHLORIDE 200 MG/1
200 TABLET ORAL DAILY
Qty: 90 TABLET | Refills: 2 | Status: SHIPPED | OUTPATIENT
Start: 2023-01-11

## 2023-02-13 PROBLEM — Z00.00 MEDICARE ANNUAL WELLNESS VISIT, SUBSEQUENT: Status: RESOLVED | Noted: 2022-12-15 | Resolved: 2023-02-13

## 2023-05-08 ENCOUNTER — OFFICE VISIT (OUTPATIENT)
Dept: FAMILY MEDICINE CLINIC | Facility: CLINIC | Age: 88
End: 2023-05-08

## 2023-05-08 VITALS
DIASTOLIC BLOOD PRESSURE: 62 MMHG | HEART RATE: 68 BPM | WEIGHT: 134 LBS | RESPIRATION RATE: 12 BRPM | TEMPERATURE: 97.3 F | OXYGEN SATURATION: 95 % | BODY MASS INDEX: 22.88 KG/M2 | HEIGHT: 64 IN | SYSTOLIC BLOOD PRESSURE: 108 MMHG

## 2023-05-08 DIAGNOSIS — C50.919 PRIMARY MALIGNANT NEOPLASM OF FEMALE BREAST (HCC): ICD-10-CM

## 2023-05-08 DIAGNOSIS — N18.31 STAGE 3A CHRONIC KIDNEY DISEASE (HCC): ICD-10-CM

## 2023-05-08 DIAGNOSIS — I10 PRIMARY HYPERTENSION: ICD-10-CM

## 2023-05-08 DIAGNOSIS — R55 VASOVAGAL SYNCOPE: Primary | ICD-10-CM

## 2023-05-08 DIAGNOSIS — I27.20 PULMONARY HYPERTENSION (HCC): ICD-10-CM

## 2023-05-08 DIAGNOSIS — I44.2 AV BLOCK, 3RD DEGREE (HCC): ICD-10-CM

## 2023-05-08 RX ORDER — CLINDAMYCIN HYDROCHLORIDE 300 MG/1
CAPSULE ORAL
COMMUNITY
Start: 2023-04-14

## 2023-05-08 RX ORDER — AMIODARONE HYDROCHLORIDE 100 MG/1
100 TABLET ORAL DAILY
COMMUNITY
Start: 2023-04-13

## 2023-05-08 NOTE — PROGRESS NOTES
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)  Assessment & Plan:  Stable, no issues at this time  5  Stage 3a chronic kidney disease ([facility] [address] )  Assessment & Plan:  Encourage hydration throughout the day  Recheck BMP r/t recent syncopal episode  Orders:  -     Basic metabolic panel    6  Primary hypertension  Assessment & Plan:  BP wnl in office today  Stable, no changes  Return if symptoms worsen or fail to improve  Subjective:      Patient ID: Kayla Romano is a 80 y o  female  Chief Complaint   Patient presents with   • Loss of Consciousness     Pt here for syncope on 5/6       Willam Barrios is a 80year old female who with HTN, AV block paroxysmal aflutter, pulmonary hypertension, CKD III who presents to the office for evaluation and management syncopal episode x's 1  Pt reports that she had a  come to her home to do her hair  Reports that as her hair was being dried, she put her head down and was told that she did not respond for about 1-2 minutes  States that she was drooling  Pt reports that she felt slightly nauseas the day after the initial incident  States that she has been feeling well without any issues since the incident         The following portions of the patient's history were reviewed and "updated as appropriate: allergies, current medications, past family history, past medical history, past social history, past surgical history and problem list     Review of Systems   Constitutional: Negative for chills, diaphoresis, fatigue and fever  HENT: Negative for congestion, ear discharge, ear pain, postnasal drip, rhinorrhea, sinus pressure, sinus pain and sore throat  Eyes: Negative for pain and discharge  Respiratory: Negative for cough, chest tightness, shortness of breath and wheezing  Cardiovascular: Negative for chest pain, palpitations and leg swelling  Gastrointestinal: Negative for diarrhea, nausea and vomiting  Genitourinary: Negative for dysuria  Musculoskeletal: Negative for myalgias  Skin: Negative for rash  Neurological: Positive for syncope (one episode on 5/6)  Negative for dizziness and headaches  Hematological: Negative for adenopathy  Current Outpatient Medications   Medication Sig Dispense Refill   • amiodarone 100 mg tablet Take 100 mg by mouth daily     • carvedilol (COREG) 25 mg tablet Take 25 mg by mouth 2 (two) times a day with meals     • Cholecalciferol (Vitamin D-3) 25 MCG (1000 UT) CAPS Take by mouth daily     • clindamycin (CLEOCIN) 300 MG capsule TAKE 600MG (2 CAPSULES) ONE HOUR PRIOR TO DENTAL WORK     • losartan (COZAAR) 50 mg tablet 50 mg daily     • amiodarone 200 mg tablet TAKE 1 TABLET (200 MG TOTAL) BY MOUTH DAILY (Patient not taking: Reported on 5/8/2023) 90 tablet 2   • Xarelto 15 MG tablet Take 15 mg by mouth daily       No current facility-administered medications for this visit  Objective:    /62 (BP Location: Left arm, Patient Position: Sitting, Cuff Size: Large)   Pulse 68   Temp (!) 97 3 °F (36 3 °C) (Temporal)   Resp 12   Ht 5' 4\" (1 626 m)   Wt 60 8 kg (134 lb)   SpO2 95%   BMI 23 00 kg/m²        Physical Exam  Vitals reviewed  Constitutional:       General: She is not in acute distress       Appearance: Normal " appearance  She is well-developed  She is not diaphoretic  HENT:      Head: Normocephalic and atraumatic  Eyes:      General:         Right eye: No discharge  Left eye: No discharge  Conjunctiva/sclera: Conjunctivae normal    Neck:      Thyroid: No thyromegaly  Cardiovascular:      Rate and Rhythm: Normal rate and regular rhythm  Heart sounds: Normal heart sounds  Pulmonary:      Effort: Pulmonary effort is normal  No respiratory distress  Breath sounds: Normal breath sounds  No decreased breath sounds, wheezing, rhonchi or rales  Musculoskeletal:      Cervical back: Normal range of motion and neck supple  Lymphadenopathy:      Cervical: No cervical adenopathy  Skin:     General: Skin is warm and dry  Findings: No rash  Neurological:      General: No focal deficit present  Mental Status: She is alert and oriented to person, place, and time  Cranial Nerves: Cranial nerves 2-12 are intact  Motor: Motor function is intact  Coordination: Coordination is intact  Deep Tendon Reflexes: Reflexes are normal and symmetric  Psychiatric:         Mood and Affect: Mood normal          Behavior: Behavior normal          Thought Content:  Thought content normal          Judgment: Judgment normal                 Braxton Bending, CRNP

## 2023-05-09 LAB
BASOPHILS # BLD AUTO: 0 X10E3/UL (ref 0–0.2)
BASOPHILS NFR BLD AUTO: 1 %
BUN SERPL-MCNC: 34 MG/DL (ref 10–36)
BUN/CREAT SERPL: 21 (ref 12–28)
CALCIUM SERPL-MCNC: 9.8 MG/DL (ref 8.7–10.3)
CHLORIDE SERPL-SCNC: 101 MMOL/L (ref 96–106)
CO2 SERPL-SCNC: 21 MMOL/L (ref 20–29)
CREAT SERPL-MCNC: 1.61 MG/DL (ref 0.57–1)
CRP SERPL-MCNC: 2 MG/L (ref 0–10)
EGFR: 30 ML/MIN/1.73
EOSINOPHIL # BLD AUTO: 0.1 X10E3/UL (ref 0–0.4)
EOSINOPHIL NFR BLD AUTO: 1 %
ERYTHROCYTE [DISTWIDTH] IN BLOOD BY AUTOMATED COUNT: 13.3 % (ref 11.7–15.4)
ERYTHROCYTE [SEDIMENTATION RATE] IN BLOOD BY WESTERGREN METHOD: 46 MM/HR (ref 0–40)
GLUCOSE SERPL-MCNC: 94 MG/DL (ref 70–99)
HCT VFR BLD AUTO: 39.8 % (ref 34–46.6)
HGB BLD-MCNC: 13.5 G/DL (ref 11.1–15.9)
IMM GRANULOCYTES # BLD: 0 X10E3/UL (ref 0–0.1)
IMM GRANULOCYTES NFR BLD: 0 %
LYMPHOCYTES # BLD AUTO: 1.3 X10E3/UL (ref 0.7–3.1)
LYMPHOCYTES NFR BLD AUTO: 24 %
MCH RBC QN AUTO: 31.7 PG (ref 26.6–33)
MCHC RBC AUTO-ENTMCNC: 33.9 G/DL (ref 31.5–35.7)
MCV RBC AUTO: 93 FL (ref 79–97)
MONOCYTES # BLD AUTO: 0.5 X10E3/UL (ref 0.1–0.9)
MONOCYTES NFR BLD AUTO: 9 %
NEUTROPHILS # BLD AUTO: 3.6 X10E3/UL (ref 1.4–7)
NEUTROPHILS NFR BLD AUTO: 65 %
PLATELET # BLD AUTO: 255 X10E3/UL (ref 150–450)
POTASSIUM SERPL-SCNC: 5.1 MMOL/L (ref 3.5–5.2)
RBC # BLD AUTO: 4.26 X10E6/UL (ref 3.77–5.28)
SODIUM SERPL-SCNC: 137 MMOL/L (ref 134–144)
WBC # BLD AUTO: 5.5 X10E3/UL (ref 3.4–10.8)

## 2023-05-23 ENCOUNTER — TELEPHONE (OUTPATIENT)
Dept: FAMILY MEDICINE CLINIC | Facility: CLINIC | Age: 88
End: 2023-05-23

## 2023-06-08 ENCOUNTER — RA CDI HCC (OUTPATIENT)
Dept: OTHER | Facility: HOSPITAL | Age: 88
End: 2023-06-08

## 2023-06-15 ENCOUNTER — OFFICE VISIT (OUTPATIENT)
Dept: FAMILY MEDICINE CLINIC | Facility: CLINIC | Age: 88
End: 2023-06-15
Payer: MEDICARE

## 2023-06-15 VITALS
BODY MASS INDEX: 23.05 KG/M2 | DIASTOLIC BLOOD PRESSURE: 58 MMHG | SYSTOLIC BLOOD PRESSURE: 102 MMHG | OXYGEN SATURATION: 96 % | WEIGHT: 135 LBS | RESPIRATION RATE: 12 BRPM | HEART RATE: 61 BPM | HEIGHT: 64 IN | TEMPERATURE: 97.3 F

## 2023-06-15 DIAGNOSIS — C50.919 PRIMARY MALIGNANT NEOPLASM OF FEMALE BREAST (HCC): ICD-10-CM

## 2023-06-15 DIAGNOSIS — I44.2 AV BLOCK, 3RD DEGREE (HCC): ICD-10-CM

## 2023-06-15 DIAGNOSIS — I10 PRIMARY HYPERTENSION: Primary | ICD-10-CM

## 2023-06-15 DIAGNOSIS — N18.31 STAGE 3A CHRONIC KIDNEY DISEASE (HCC): ICD-10-CM

## 2023-06-15 PROCEDURE — 99214 OFFICE O/P EST MOD 30 MIN: CPT | Performed by: FAMILY MEDICINE

## 2023-06-15 NOTE — PROGRESS NOTES
Name: Reina Wetzel      : 1930      MRN: [id number]  Encounter Provider: John Yoder MD  Encounter Date: 6/15/2023   Encounter department: [id number] [facility]     1  Primary hypertension  Assessment & Plan:  STABLE  DENIES ANY CP, SOB, PALPITATIONS, OR HEADACHE  NOTES NO WATER RETENTION  COMPLIANT WITH MEDICATION  NO CONCERNS    - CONTINUE CURRENT TREATMENT PLAN  - MONITOR DIETARY SODIUM INTAKE  - ENCOURAGE PHYSICAL ACTIVITY  - RV 3 MONTHS      Orders:  -     Basic metabolic panel    2  Stage 3a chronic kidney disease (HCC)  -     Basic metabolic panel    3  AV block, 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)  Assessment & Plan:  Followed by cardiology        4  Primary malignant neoplasm of female breast [facility])  Assessment & Plan:  remission             Subjective      PATIENT RETURNS FOR ROUTINE EVALUATION OF PATIENT'S MEDICAL ISSUES    INDIVIDUAL MEDICAL ISSUES WITH THEIR CURRENT STATUS, ASSESSMENT AND PLANS ARE LISTED ABOVE        Review of Systems   Constitutional: Negative for chills, fatigue and fever  HENT: Negative for congestion, ear discharge, ear pain, mouth sores, postnasal drip, sore throat and trouble swallowing  Eyes: Negative for pain, discharge and visual disturbance  Respiratory: Negative for cough, shortness of breath and wheezing  Cardiovascular: Negative for chest pain, palpitations and leg swelling  Gastrointestinal: Negative for abdominal distention, abdominal pain, blood in stool, diarrhea and nausea  Endocrine: Negative for polydipsia, polyphagia and polyuria  Genitourinary: Negative for dysuria, frequency, hematuria and urgency  Musculoskeletal: Positive for arthralgias  Negative for gait problem and joint swelling  Skin: Negative for pallor and rash  Neurological: Negative for dizziness, syncope, speech difficulty, weakness, light-headedness, numbness and headaches  Hematological: Negative for adenopathy     Psychiatric/Behavioral: "Negative for behavioral problems, confusion and sleep disturbance  The patient is not nervous/anxious  Current Outpatient Medications on File Prior to Visit   Medication Sig   • amiodarone 100 mg tablet Take 100 mg by mouth daily   • carvedilol (COREG) 25 mg tablet Take 25 mg by mouth 2 (two) times a day with meals   • Cholecalciferol (Vitamin D-3) 25 MCG (1000 UT) CAPS Take by mouth daily   • clindamycin (CLEOCIN) 300 MG capsule TAKE 600MG (2 CAPSULES) ONE HOUR PRIOR TO DENTAL WORK   • losartan (COZAAR) 50 mg tablet 50 mg daily   • Xarelto 15 MG tablet Take 15 mg by mouth daily   • [DISCONTINUED] amiodarone 200 mg tablet TAKE 1 TABLET (200 MG TOTAL) BY MOUTH DAILY (Patient not taking: Reported on 5/8/2023)       Objective     /58 (BP Location: Left arm, Patient Position: Sitting, Cuff Size: Large)   Pulse 61   Temp (!) 97 3 °F (36 3 °C) (Temporal)   Resp 12   Ht 5' 4\" (1 626 m)   Wt 61 2 kg (135 lb)   SpO2 96%   BMI 23 17 kg/m²     Physical Exam  Constitutional:       General: She is not in acute distress  Appearance: Normal appearance  She is well-developed and normal weight  She is not ill-appearing  HENT:      Head: Normocephalic and atraumatic  Eyes:      General:         Right eye: No discharge  Left eye: No discharge  Conjunctiva/sclera: Conjunctivae normal       Pupils: Pupils are equal, round, and reactive to light  Neck:      Thyroid: No thyromegaly  Cardiovascular:      Rate and Rhythm: Normal rate and regular rhythm  Heart sounds: Normal heart sounds  No murmur heard  Pulmonary:      Effort: Pulmonary effort is normal  No respiratory distress  Breath sounds: Normal breath sounds  No wheezing or rales  Abdominal:      General: Bowel sounds are normal       Palpations: Abdomen is soft  Tenderness: There is no abdominal tenderness  Musculoskeletal:         General: No tenderness  Cervical back: Normal range of motion and neck supple        " Comments: MODERATE DJD CHANGES       Lymphadenopathy:      Cervical: No cervical adenopathy  Skin:     General: Skin is warm and dry  Findings: No erythema or rash  Neurological:      General: No focal deficit present  Mental Status: She is alert and oriented to person, place, and time  Psychiatric:         Mood and Affect: Mood normal          Behavior: Behavior normal          Thought Content:  Thought content normal          Judgment: Judgment normal        Ras Richardson MD

## 2023-06-16 LAB
BUN SERPL-MCNC: 29 MG/DL (ref 10–36)
BUN/CREAT SERPL: 23 (ref 12–28)
CALCIUM SERPL-MCNC: 9.3 MG/DL (ref 8.7–10.3)
CHLORIDE SERPL-SCNC: 104 MMOL/L (ref 96–106)
CO2 SERPL-SCNC: 24 MMOL/L (ref 20–29)
CREAT SERPL-MCNC: 1.26 MG/DL (ref 0.57–1)
EGFR: 40 ML/MIN/1.73
GLUCOSE SERPL-MCNC: 111 MG/DL (ref 70–99)
POTASSIUM SERPL-SCNC: 5 MMOL/L (ref 3.5–5.2)
SODIUM SERPL-SCNC: 140 MMOL/L (ref 134–144)

## 2023-11-22 DIAGNOSIS — Z13.220 SCREENING CHOLESTEROL LEVEL: ICD-10-CM

## 2023-11-22 DIAGNOSIS — I48.92 PAROXYSMAL ATRIAL FLUTTER (HCC): Primary | ICD-10-CM

## 2023-11-22 DIAGNOSIS — Z95.0 PACEMAKER: ICD-10-CM

## 2023-11-22 DIAGNOSIS — Z00.00 ENCOUNTER FOR ANNUAL WELLNESS VISIT (AWV) IN MEDICARE PATIENT: ICD-10-CM

## 2023-11-22 DIAGNOSIS — I10 PRIMARY HYPERTENSION: ICD-10-CM

## 2023-11-22 DIAGNOSIS — I44.2 AV BLOCK, 3RD DEGREE (HCC): ICD-10-CM

## 2023-12-14 PROBLEM — S72.001A CLOSED FRACTURE OF RIGHT HIP REQUIRING OPERATIVE REPAIR (HCC): Status: RESOLVED | Noted: 2021-05-10 | Resolved: 2023-12-14

## 2023-12-15 ENCOUNTER — OFFICE VISIT (OUTPATIENT)
Dept: FAMILY MEDICINE CLINIC | Facility: CLINIC | Age: 88
End: 2023-12-15
Payer: MEDICARE

## 2023-12-15 ENCOUNTER — TELEPHONE (OUTPATIENT)
Dept: ADMINISTRATIVE | Facility: OTHER | Age: 88
End: 2023-12-15

## 2023-12-15 VITALS
SYSTOLIC BLOOD PRESSURE: 132 MMHG | OXYGEN SATURATION: 97 % | HEART RATE: 68 BPM | DIASTOLIC BLOOD PRESSURE: 82 MMHG | BODY MASS INDEX: 23.22 KG/M2 | TEMPERATURE: 97.9 F | RESPIRATION RATE: 12 BRPM | WEIGHT: 136 LBS | HEIGHT: 64 IN

## 2023-12-15 DIAGNOSIS — Z95.0 PACEMAKER: ICD-10-CM

## 2023-12-15 DIAGNOSIS — I48.92 PAROXYSMAL ATRIAL FLUTTER (HCC): ICD-10-CM

## 2023-12-15 DIAGNOSIS — I10 PRIMARY HYPERTENSION: Primary | ICD-10-CM

## 2023-12-15 DIAGNOSIS — Z00.00 MEDICARE ANNUAL WELLNESS VISIT, SUBSEQUENT: ICD-10-CM

## 2023-12-15 DIAGNOSIS — I44.2 AV BLOCK, 3RD DEGREE (HCC): ICD-10-CM

## 2023-12-15 DIAGNOSIS — C50.919 PRIMARY MALIGNANT NEOPLASM OF FEMALE BREAST (HCC): ICD-10-CM

## 2023-12-15 DIAGNOSIS — M15.9 PRIMARY OSTEOARTHRITIS INVOLVING MULTIPLE JOINTS: ICD-10-CM

## 2023-12-15 DIAGNOSIS — N18.31 STAGE 3A CHRONIC KIDNEY DISEASE (HCC): ICD-10-CM

## 2023-12-15 DIAGNOSIS — I27.20 PULMONARY HYPERTENSION (HCC): ICD-10-CM

## 2023-12-15 PROBLEM — M15.0 PRIMARY OSTEOARTHRITIS INVOLVING MULTIPLE JOINTS: Status: ACTIVE | Noted: 2023-12-15

## 2023-12-15 PROCEDURE — G0439 PPPS, SUBSEQ VISIT: HCPCS | Performed by: FAMILY MEDICINE

## 2023-12-15 PROCEDURE — 99215 OFFICE O/P EST HI 40 MIN: CPT | Performed by: FAMILY MEDICINE

## 2023-12-15 NOTE — PATIENT INSTRUCTIONS
DISCUSSED HEALTH MAINTENENCE ISSUES  BW WILL BE REVIEWED  ENCOURAGED HEALTHY DIET AND EXERCISE    RV FOR ANNUAL HEALTH EXAM IN 1 YEAR  RV SOONER IF THERE ARE ANY CONCERNS      RV 6M        Medicare Preventive Visit Patient Instructions  Thank you for completing your Welcome to Medicare Visit or Medicare Annual Wellness Visit today. Your next wellness visit will be due in one year (12/15/2024). The screening/preventive services that you may require over the next 5-10 years are detailed below. Some tests may not apply to you based off risk factors and/or age. Screening tests ordered at today's visit but not completed yet may show as past due. Also, please note that scanned in results may not display below. Preventive Screenings:  Service Recommendations Previous Testing/Comments   Colorectal Cancer Screening  * Colonoscopy    * Fecal Occult Blood Test (FOBT)/Fecal Immunochemical Test (FIT)  * Fecal DNA/Cologuard Test  * Flexible Sigmoidoscopy Age: 43-73 years old   Colonoscopy: every 10 years (may be performed more frequently if at higher risk)  OR  FOBT/FIT: every 1 year  OR  Cologuard: every 3 years  OR  Sigmoidoscopy: every 5 years  Screening may be recommended earlier than age 39 if at higher risk for colorectal cancer. Also, an individualized decision between you and your healthcare provider will decide whether screening between the ages of 77-80 would be appropriate. Colonoscopy: Not on file  FOBT/FIT: Not on file  Cologuard: Not on file  Sigmoidoscopy: Not on file    Screening Not Indicated     Breast Cancer Screening Age: 36 years old  Frequency: every 1-2 years  Not required if history of left and right mastectomy Mammogram: 12/13/2019    History Breast Cancer   Cervical Cancer Screening Between the ages of 21-29, pap smear recommended once every 3 years. Between the ages of 32-69, can perform pap smear with HPV co-testing every 5 years.    Recommendations may differ for women with a history of total hysterectomy, cervical cancer, or abnormal pap smears in past. Pap Smear: Not on file    Screening Not Indicated   Hepatitis C Screening Once for adults born between 1945 and 1965  More frequently in patients at high risk for Hepatitis C Hep C Antibody: Not on file        Diabetes Screening 1-2 times per year if you're at risk for diabetes or have pre-diabetes Fasting glucose: No results in last 5 years (No results in last 5 years)  A1C: No results in last 5 years (No results in last 5 years)  Screening Current   Cholesterol Screening Once every 5 years if you don't have a lipid disorder. May order more often based on risk factors. Lipid panel: Not on file          Other Preventive Screenings Covered by Medicare:  Abdominal Aortic Aneurysm (AAA) Screening: covered once if your at risk. You're considered to be at risk if you have a family history of AAA. Lung Cancer Screening: covers low dose CT scan once per year if you meet all of the following conditions: (1) Age 48-67; (2) No signs or symptoms of lung cancer; (3) Current smoker or have quit smoking within the last 15 years; (4) You have a tobacco smoking history of at least 20 pack years (packs per day multiplied by number of years you smoked); (5) You get a written order from a healthcare provider. Glaucoma Screening: covered annually if you're considered high risk: (1) You have diabetes OR (2) Family history of glaucoma OR (3)  aged 48 and older OR (3)  American aged 72 and older  Osteoporosis Screening: covered every 2 years if you meet one of the following conditions: (1) You're estrogen deficient and at risk for osteoporosis based off medical history and other findings; (2) Have a vertebral abnormality; (3) On glucocorticoid therapy for more than 3 months; (4) Have primary hyperparathyroidism; (5) On osteoporosis medications and need to assess response to drug therapy. Last bone density test (DXA Scan): Not on file.   HIV Screening: covered annually if you're between the age of 15-65. Also covered annually if you are younger than 13 and older than 72 with risk factors for HIV infection. For pregnant patients, it is covered up to 3 times per pregnancy. Immunizations:  Immunization Recommendations   Influenza Vaccine Annual influenza vaccination during flu season is recommended for all persons aged >= 6 months who do not have contraindications   Pneumococcal Vaccine   * Pneumococcal conjugate vaccine = PCV13 (Prevnar 13), PCV15 (Vaxneuvance), PCV20 (Prevnar 20)  * Pneumococcal polysaccharide vaccine = PPSV23 (Pneumovax) Adults 32-44 yo with certain risk factors or if 69+ yo  If never received any pneumonia vaccine: recommend Prevnar 20 (PCV20)  Give PCV20 if previously received 1 dose of PCV13 or PPSV23   Hepatitis B Vaccine 3 dose series if at intermediate or high risk (ex: diabetes, end stage renal disease, liver disease)   Respiratory syncytial virus (RSV) Vaccine - COVERED BY MEDICARE PART D  * RSVPreF3 (Arexvy) CDC recommends that adults 61years of age and older may receive a single dose of RSV vaccine using shared clinical decision-making (SCDM)   Tetanus (Td) Vaccine - COST NOT COVERED BY MEDICARE PART B Following completion of primary series, a booster dose should be given every 10 years to maintain immunity against tetanus. Td may also be given as tetanus wound prophylaxis. Tdap Vaccine - COST NOT COVERED BY MEDICARE PART B Recommended at least once for all adults. For pregnant patients, recommended with each pregnancy.    Shingles Vaccine (Shingrix) - COST NOT COVERED BY MEDICARE PART B  2 shot series recommended in those 19 years and older who have or will have weakened immune systems or those 50 years and older     Health Maintenance Due:      Topic Date Due   • Breast Cancer Screening: Mammogram  12/13/2020     Immunizations Due:      Topic Date Due   • Influenza Vaccine (1) 09/01/2023   • COVID-19 Vaccine (3 - 2023-24 season) 09/01/2023     Advance Directives   What are advance directives? Advance directives are legal documents that state your wishes and plans for medical care. These plans are made ahead of time in case you lose your ability to make decisions for yourself. Advance directives can apply to any medical decision, such as the treatments you want, and if you want to donate organs. What are the types of advance directives? There are many types of advance directives, and each state has rules about how to use them. You may choose a combination of any of the following:  Living will: This is a written record of the treatment you want. You can also choose which treatments you do not want, which to limit, and which to stop at a certain time. This includes surgery, medicine, IV fluid, and tube feedings. Durable power of  for healthcare [facility]): This is a written record that states who you want to make healthcare choices for you when you are unable to make them for yourself. This person, called a proxy, is usually a family member or a friend. You may choose more than 1 proxy. Do not resuscitate (DNR) order:  A DNR order is used in case your heart stops beating or you stop breathing. It is a request not to have certain forms of treatment, such as CPR. A DNR order may be included in other types of advance directives. Medical directive: This covers the care that you want if you are in a coma, near death, or unable to make decisions for yourself. You can list the treatments you want for each condition. Treatment may include pain medicine, surgery, blood transfusions, dialysis, IV or tube feedings, and a ventilator (breathing machine). Values history: This document has questions about your views, beliefs, and how you feel and think about life. This information can help others choose the care that you would choose. Why are advance directives important? An advance directive helps you control your care. Although spoken wishes may be used, it is better to have your wishes written down. Spoken wishes can be misunderstood, or not followed. Treatments may be given even if you do not want them. An advance directive may make it easier for your family to make difficult choices about your care. © Copyright [employer] 2018 Information is for End User's use only and may not be sold, redistributed or otherwise used for commercial purposes.  All illustrations and images included in CareNotes® are the copyrighted property of [employer] Inc[employer] or [address] [place] [address]

## 2023-12-15 NOTE — PROGRESS NOTES
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 They screened negative with a PHQ-2 score of 0. Preventive health issues were discussed with patient, and age appropriate screening tests were ordered as noted in patient's After Visit Summary. Personalized health advice and appropriate referrals for health education or preventive services given if needed, as noted in patient's After Visit Summary. History of Present Illness:     Patient presents for a Medicare Wellness Visit    PATIENT [facility] AND ANNUAL EVALUATION OF PATIENT'S MEDICAL ISSUES    INDIVIDUAL MEDICAL ISSUES WITH THEIR CURRENT STATUS, ASSESSMENT AND PLANS ARE LISTED ABOVE      REVIEWED BW RESULTS AND HEALTH ISSUES       Patient Care Team:  Wendy Rogers MD as PCP - General Neetu Shirley MD (Oncology)     Review of Systems:     Review of Systems   Constitutional:  Negative for chills, fatigue and fever.    HENT:  Negative for congestion, ear discharge, ear pain, mouth sores, postnasal drip, sore throat and trouble swallowing. Eyes:  Negative for pain, discharge and visual disturbance. Respiratory:  Negative for cough, shortness of breath and wheezing. Cardiovascular:  Positive for leg swelling. Negative for chest pain and palpitations. Gastrointestinal:  Negative for abdominal distention, abdominal pain, blood in stool, diarrhea and nausea. Endocrine: Negative for polydipsia, polyphagia and polyuria. Genitourinary:  Negative for dysuria, frequency, hematuria and urgency. Musculoskeletal:  Positive for arthralgias. Negative for gait problem and joint swelling. Skin:  Negative for pallor and rash. Neurological:  Negative for dizziness, syncope, speech difficulty, weakness, light-headedness, numbness and headaches. Hematological:  Negative for adenopathy. Psychiatric/Behavioral:  Negative for behavioral problems, confusion and sleep disturbance. The patient is not nervous/anxious.          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)   • Pacemaker   • Paroxysmal atrial flutter (HCC)   • Primary malignant neoplasm of female breast ([address])   • Stage 3a chronic kidney disease ([address])   • Pulmonary hypertension (HCC)   • Primary osteoarthritis involving multiple joints      Past Medical and Surgical History:     Past Medical History:   Diagnosis Date   • Essential hypertension     LAST ASSESSED: 4/27/15   • Malignant neoplasm of breast ([address])     RIGHT; LAST ASSESSED: 4/27/15   • Osteoporosis     LAST ASSESSED: 4/27/15   • Pacemaker 04/15/2021     Past Surgical History:   Procedure Laterality Date   • BLADDER SURGERY     • BREAST LUMPECTOMY      LAST ASSESSED: 4/27/15   • HIP SURGERY Right 04/16/2021      Family History:     Family History   Problem Relation Age of Onset   • Stroke Mother         CEREBROVASCULAR ACCIDENT   • Hypertension Mother    • Ovarian cancer Mother    • Pancreatic cancer Mother         PANCREAS CARCINOMA   • Pancreatic cancer Brother         PANCREAS CARCINOMA   • Bipolar disorder Other    • Substance Abuse Neg Hx    • Mental illness Neg Hx       Social History:     Social History     Socioeconomic History   • Marital status:      Spouse name: None   • Number of children: None   • Years of education: None   • Highest education level: None   Occupational History   • None   Tobacco Use   • Smoking status: Never   • Smokeless tobacco: Never   Vaping Use   • Vaping status: Never Used   Substance and Sexual Activity   • Alcohol use: No     Comment: ALCOHOL USE: WINE ON OCCASION, OCCASIONAL ALCOHOL USE AS PER ALLSCRIPTS   • Drug use: No   • Sexual activity: None   Other Topics Concern   • None   Social History Narrative    CAFFEINE USE    DENTAL CARE, REGULARLY    DRINKS COFFEE    TEA     Social Determinants of Health     Financial Resource Strain: Low Risk  (12/15/2023)    Overall Financial Resource Strain (CARDIA)    • Difficulty of Paying Living Expenses: Not hard at all   Food Insecurity: Not on file   Transportation Needs: No Transportation Needs (12/15/2023)    PRAPARE - Transportation    • Lack of Transportation (Medical): No    • Lack of Transportation (Non-Medical):  No   Physical Activity: Not on file   Stress: Not on file   Social Connections: Not on file   Intimate Partner Violence: Not on file   Housing Stability: Not on file      Medications and Allergies:     Current Outpatient Medications   Medication Sig Dispense Refill   • amiodarone 100 mg tablet Take 100 mg by mouth daily     • carvedilol (COREG) 25 mg tablet Take 25 mg by mouth 2 (two) times a day with meals     • Cholecalciferol (Vitamin D-3) 25 MCG (1000 UT) CAPS Take by mouth daily     • clindamycin (CLEOCIN) 300 MG capsule TAKE 600MG (2 CAPSULES) ONE HOUR PRIOR TO DENTAL WORK     • losartan (COZAAR) 50 mg tablet 50 mg daily     • Xarelto 15 MG tablet Take 15 mg by mouth daily       No current facility-administered medications for this visit. Allergies   Allergen Reactions   • Penicillins Hives   • Sulfa Antibiotics Other (See Comments)     Pt doesn't remember      Immunizations:     Immunization History   Administered Date(s) Administered   • COVID-19 MODERNA VACC 0.5 ML IM 03/12/2021, 05/18/2021   • INFLUENZA 09/15/2019   • Influenza Quadrivalent 3 years and older 10/02/2017   • Influenza Quadrivalent Preservative Free 3 years and older IM 10/22/2015   • Influenza Quadrivalent, 6-35 Months IM 10/22/2015   • Influenza Split High Dose Preservative Free IM 11/01/2017   • Influenza, high dose seasonal 0.7 mL 10/11/2018, 10/24/2022   • Influenza, seasonal, injectable 11/21/2014, 11/09/2016   • Pneumococcal Conjugate 13-Valent 05/17/2016   • Pneumococcal Polysaccharide PPV23 10/11/2018      Health Maintenance:         Topic Date Due   • Breast Cancer Screening: Mammogram  12/13/2020         Topic Date Due   • Influenza Vaccine (1) 09/01/2023   • COVID-19 Vaccine (3 - 2023-24 season) 09/01/2023      Medicare Screening Tests and Risk Assessments:         Health Risk Assessment:   Patient rates overall health as very good. Patient feels that their physical health rating is same. Patient is very satisfied with their life. Eyesight was rated as same. Hearing was rated as slightly worse. Patient feels that their emotional and mental health rating is much better. Patients states they are never, rarely angry. Patient states they are sometimes unusually tired/fatigued. Pain experienced in the last 7 days has been none. Patient states that she has experienced no weight loss or gain in last 6 months. Depression Screening:   PHQ-2 Score: 0  PHQ-9 Score: 0      Fall Risk Screening: In the past year, patient has experienced: no history of falling in past year      Urinary Incontinence Screening:   Patient has not leaked urine accidently in the last six months.      Home Safety:  Patient does not have trouble with stairs inside or outside of their home. Patient has working smoke alarms and has working carbon monoxide detector. Home safety hazards include: none. Nutrition:   Current diet is Regular. Medications:   Patient is currently taking over-the-counter supplements. OTC medications include: see medication list. Patient is able to manage medications. Activities of Daily Living (ADLs)/Instrumental Activities of Daily Living (IADLs):   Walk and transfer into and out of bed and chair?: Yes  Dress and groom yourself?: Yes    Bathe or shower yourself?: Yes    Feed yourself? Yes  Do your laundry/housekeeping?: Yes  Manage your money, pay your bills and track your expenses?: Yes  Make your own meals?: Yes    Do your own shopping?: Yes    Previous Hospitalizations:   Any hospitalizations or ED visits within the last 12 months?: No      Advance Care Planning:   Living will: Yes    Durable POA for healthcare: Yes    Advanced directive: Yes    Provider agrees with end of life decisions: Yes      PREVENTIVE SCREENINGS      Cardiovascular Screening:    General: Screening Current      Diabetes Screening:     General: Screening Current      Colorectal Cancer Screening:     General: Screening Not Indicated      Breast Cancer Screening:     General: History Breast Cancer      Cervical Cancer Screening:    General: Screening Not Indicated      Abdominal Aortic Aneurysm (AAA) Screening:        General: Screening Not Indicated      Lung Cancer Screening:     General: Screening Not Indicated      Hepatitis C Screening:    General: Screening Not Indicated    Screening, Brief Intervention, and Referral to Treatment (SBIRT)    Screening  Typical number of drinks in a day: 0  Typical number of drinks in a week: 0  Interpretation: Low risk drinking behavior.     Single Item Drug Screening:  How often have you used an illegal drug (including marijuana) or a prescription medication for non-medical reasons in the past year? never    Single Item Drug Screen Score: 0  Interpretation: Negative screen for possible drug use disorder    No results found. Physical Exam:     /82   Pulse 68   Temp 97.9 °F (36.6 °C) (Temporal)   Resp 12   Ht 5' 4" (1.626 m)   Wt 61.7 kg (136 lb)   SpO2 97%   BMI 23.34 kg/m²     Physical Exam  Constitutional:       General: She is not in acute distress. Appearance: Normal appearance. She is well-developed and normal weight. She is not ill-appearing. HENT:      Head: Normocephalic and atraumatic. Right Ear: Tympanic membrane and external ear normal.      Left Ear: Tympanic membrane and external ear normal.      Nose: Nose normal.      Mouth/Throat:      Mouth: Mucous membranes are moist.      Pharynx: No oropharyngeal exudate. Eyes:      General: No scleral icterus. Right eye: No discharge. Left eye: No discharge. Conjunctiva/sclera: Conjunctivae normal.      Pupils: Pupils are equal, round, and reactive to light. Neck:      Thyroid: No thyromegaly. Trachea: No tracheal deviation. Cardiovascular:      Rate and Rhythm: Normal rate and regular rhythm. Heart sounds: Normal heart sounds. No murmur heard. No friction rub. No gallop. Pulmonary:      Effort: Pulmonary effort is normal. No respiratory distress. Breath sounds: Normal breath sounds. No wheezing or rales. Chest:      Chest wall: No tenderness. Abdominal:      General: Bowel sounds are normal. There is no distension. Palpations: Abdomen is soft. There is no mass. Tenderness: There is no abdominal tenderness. There is no guarding or rebound. Hernia: No hernia is present. Musculoskeletal:         General: No tenderness or deformity. Normal range of motion. Cervical back: Normal range of motion and neck supple. Comments: MILD DJD CHANGES   Lymphadenopathy:      Cervical: No cervical adenopathy.    Skin:     General: Skin is warm and dry. Findings: No erythema or rash. Neurological:      General: No focal deficit present. Mental Status: She is alert and oriented to person, place, and time. Cranial Nerves: No cranial nerve deficit. Sensory: No sensory deficit. Motor: No weakness or abnormal muscle tone. Coordination: Coordination normal.      Gait: Gait normal.      Deep Tendon Reflexes: Reflexes normal.   Psychiatric:         Mood and Affect: Mood normal.         Behavior: Behavior normal.         Thought Content:  Thought content normal.         Judgment: Judgment normal.        REVIEWED HEALTH ISSUES   REVIEWED  LENGTH OF VISIT 54 MIN  Valerie Rojas MD

## 2023-12-15 NOTE — TELEPHONE ENCOUNTER
Upon review of the In Basket request and the patient's chart, initial outreach has been made via fax to facility. Please see Contacts section for details.     Thank you  Mala Camarena

## 2023-12-15 NOTE — LETTER
Vaccination Request Form: COVID-19 aka SARS-CoV-2 (Moderna or Pfizer or J & J), Influenza, and Zoster      Date Requested: 12/15/23  Patient: Minerva Crockett  Patient : 1930   Referring Provider: Zaki Anderson MD       The above patient has informed us that they have had their   most recent COVID-19 aka SARS-CoV-2 (Moderna or Pfizer or J & J), Influenza, and Zoster administered at your facility. Please   complete this form and attach all corresponding documentation.    Date of Vaccine(s) Given  ______________________________    Lot Number(s) _______________________________________    Manufacture(s) ______________________________________    Dose Amount (s) _____________________________________    Expiration Date(s) ____________________________________    Comments __________________________________________________________  ____________________________________________________________________  ____________________________________________________________________  ____________________________________________________________________    Administering Facility  ________________________________________________    Vaccine Administered By (print name) ___________________________________      Form Completed By (print name) _______________________________________      Signature ___________________________________________________________      These reports are needed for  compliance.    Please fax this completed form and a copy of the Vaccine Document(s) to our office located at [address] [place] [address] as soon as possible to Fax [phone] [email or web address] Medeiros: Phone [phone]    We thank you for your assistance in treating our mutual patient.

## 2023-12-15 NOTE — TELEPHONE ENCOUNTER
----- Message from Alexis Donis sent at 12/15/2023  8:05 AM EST -----  Regarding: CARE GAP REQUST- immunization  12/15/23 8:05 AM    Hello, our patient attached above has had Immunization(s) completed/performed. Please assist in updating the patient chart by making an External outreach to [facility] facility located in [place], [place]. The date of service is Fall 2023.    Thank you,  Alexis Donis, [facility]  [facility]

## 2023-12-15 NOTE — LETTER
Vaccination Request Form: COVID-19 aka SARS-CoV-2 (Moderna or Pfizer or J & J), Influenza, and Zoster      Date Requested: 12/15/23  Patient: Minerva Crockett  Patient : 1930   Referring Provider: Zaki Anderson MD       The above patient has informed us that they have had their   most recent COVID-19 aka SARS-CoV-2 (Moderna or Pfizer or J & J), Influenza, and Zoster administered at your facility. Please   complete this form and attach all corresponding documentation.    Date of Vaccine(s) Given  ______________________________    Lot Number(s) _______________________________________    Manufacture(s) ______________________________________    Dose Amount (s) _____________________________________    Expiration Date(s) ____________________________________    Comments __________________________________________________________  ____________________________________________________________________  ____________________________________________________________________  ____________________________________________________________________    Administering Facility  ________________________________________________    Vaccine Administered By (print name) ___________________________________      Form Completed By (print name) _______________________________________      Signature ___________________________________________________________      These reports are needed for  compliance.    Please fax this completed form and a copy of the Vaccine Document(s) to our office located at [address] [place] [address] as soon as possible to Fax [phone] [email or web address] Medeiros: Phone [phone]    We thank you for your assistance in treating our mutual patient.

## 2023-12-19 ENCOUNTER — TELEPHONE (OUTPATIENT)
Age: 88
End: 2023-12-19

## 2023-12-19 NOTE — TELEPHONE ENCOUNTER
FYI: Patient was pleased with office service. She stated the office was decorated nicely and she felt much better coming out than when she came in. She said everyone is wonderful and it was a great experience.

## 2023-12-22 NOTE — TELEPHONE ENCOUNTER
Upon review of the In Basket request we were able to locate, review, and update the patient chart as requested for Immunization(s) Flu 10-25-23 and 10-12-21. Covid 11-18-21 has been updated form is in the media tab..    Any additional questions or concerns should be emailed to the Practice Liaisons via the appropriate education email address, please do not reply via In Basket.    Thank you  Mala Camarena

## 2024-02-12 ENCOUNTER — APPOINTMENT (OUTPATIENT)
Dept: RADIOLOGY | Facility: CLINIC | Age: 89
End: 2024-02-12
Payer: MEDICARE

## 2024-02-12 ENCOUNTER — OFFICE VISIT (OUTPATIENT)
Dept: FAMILY MEDICINE CLINIC | Facility: CLINIC | Age: 89
End: 2024-02-12
Payer: MEDICARE

## 2024-02-12 VITALS
WEIGHT: 134.8 LBS | BODY MASS INDEX: 23.14 KG/M2 | SYSTOLIC BLOOD PRESSURE: 150 MMHG | HEART RATE: 71 BPM | OXYGEN SATURATION: 95 % | DIASTOLIC BLOOD PRESSURE: 80 MMHG

## 2024-02-12 DIAGNOSIS — M54.50 ACUTE MIDLINE LOW BACK PAIN WITHOUT SCIATICA: ICD-10-CM

## 2024-02-12 DIAGNOSIS — I44.2 AV BLOCK, 3RD DEGREE (HCC): ICD-10-CM

## 2024-02-12 DIAGNOSIS — C50.919 PRIMARY MALIGNANT NEOPLASM OF FEMALE BREAST (HCC): ICD-10-CM

## 2024-02-12 DIAGNOSIS — N18.31 STAGE 3A CHRONIC KIDNEY DISEASE (HCC): ICD-10-CM

## 2024-02-12 DIAGNOSIS — M25.571 ACUTE RIGHT ANKLE PAIN: Primary | ICD-10-CM

## 2024-02-12 DIAGNOSIS — I27.20 PULMONARY HYPERTENSION (HCC): ICD-10-CM

## 2024-02-12 DIAGNOSIS — M25.571 ACUTE RIGHT ANKLE PAIN: ICD-10-CM

## 2024-02-12 PROCEDURE — 99214 OFFICE O/P EST MOD 30 MIN: CPT | Performed by: STUDENT IN AN ORGANIZED HEALTH CARE EDUCATION/TRAINING PROGRAM

## 2024-02-12 PROCEDURE — 72100 X-RAY EXAM L-S SPINE 2/3 VWS: CPT

## 2024-02-12 PROCEDURE — 73610 X-RAY EXAM OF ANKLE: CPT

## 2024-02-12 NOTE — PROGRESS NOTES
Name: Minerva Crocektt      : 1930      MRN: [id number]  Encounter Provider: Yeimy Sarabia MD  Encounter Date: 2024   Encounter department: [facility]    Assessment & Plan     1. Acute right ankle pain  -     XR ankle 3+ vw right; Future; Expected date: 2024    2. Acute midline low back pain without sciatica  -     XR spine lumbar 2 or 3 views injury; Future; Expected date: 2024    3. Pulmonary hypertension (HCC)  Assessment & Plan:  Stable      4. 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 Primary malignant neoplasm of female breast (HCC)  Assessment & Plan:  Stable      6. Stage 3a chronic kidney disease (HCC)  Assessment & Plan:  Lab Results   Component Value Date    EGFR 40 (L) 06/15/2023    EGFR 30 (L) 2023    EGFR 47 10/14/2019    CREATININE 1.26 (H) 06/15/2023    CREATININE 1.61 (H) 2023    CREATININE 1.20 (H) 2020       Stable       Rest, Ice, and Elevation of right ankle  Take Advil as needed  Monitor pain if worsens patient should follow up in office  Xray imaging ordered  If unable to bear any weight on foot or walk she should proceed to ER    Subjective      HPI    Earlier last week she slipped and fell on her butt. She was able to get up after slipping and proceed with her day.  She notes a couple days ago she woke up with pain in her left ankle and worsening pain when she puts weight on it. She is able to walk normally in office today. She has been elevating her leg and taking advil for the pain which helps. She is able to walk around with her walker normally.     Review of Systems   Constitutional:  Negative for activity change, appetite change, chills, fatigue and fever.   HENT:  Negative for congestion.    Respiratory:  Negative for cough, shortness of breath and wheezing.    Cardiovascular:  Negative for chest pain, palpitations and leg swelling.   Gastrointestinal:  Negative for abdominal pain, constipation,  diarrhea, nausea and vomiting.   Musculoskeletal:  Positive for arthralgias, back pain and gait problem.   Neurological:  Negative for weakness, light-headedness and headaches.   Psychiatric/Behavioral:  The patient is not nervous/anxious.        Current Outpatient Medications on File Prior to Visit   Medication Sig    amiodarone 100 mg tablet Take 100 mg by mouth daily    carvedilol (COREG) 25 mg tablet Take 25 mg by mouth 2 (two) times a day with meals    Cholecalciferol (Vitamin D-3) 25 MCG (1000 UT) CAPS Take by mouth daily    clindamycin (CLEOCIN) 300 MG capsule TAKE 600MG (2 CAPSULES) ONE HOUR PRIOR TO DENTAL WORK    losartan (COZAAR) 50 mg tablet 50 mg daily    Xarelto 15 MG tablet Take 15 mg by mouth daily       Objective     /80   Pulse 71   Wt 61.1 kg (134 lb 12.8 oz)   SpO2 95%   BMI 23.14 kg/m²     Physical Exam  Constitutional:       Appearance: Normal appearance.   HENT:      Head: Normocephalic and atraumatic.   Cardiovascular:      Rate and Rhythm: Normal rate and regular rhythm.      Pulses: Normal pulses.      Heart sounds: Normal heart sounds.   Pulmonary:      Effort: Pulmonary effort is normal.      Breath sounds: Normal breath sounds.   Musculoskeletal:      Right lower leg: Edema present.      Left lower leg: Edema present.      Right ankle: No tenderness. No ATF ligament tenderness.      Left ankle: Tenderness present over the ATF ligament.      Comments: Baseline lower extremity edema   Neurological:      Mental Status: She is alert.   Psychiatric:         Mood and Affect: Mood normal.         Behavior: Behavior normal.         Thought Content: Thought content normal.         Judgment: Judgment normal.       Yeimy Sarabia MD

## 2024-02-12 NOTE — ASSESSMENT & PLAN NOTE
Lab Results   Component Value Date    EGFR 40 (L) 06/15/2023    EGFR 30 (L) 05/08/2023    EGFR 47 10/14/2019    CREATININE 1.26 (H) 06/15/2023    CREATININE 1.61 (H) 05/08/2023    CREATININE 1.20 (H) 02/26/2020       Stable

## 2024-02-14 ENCOUNTER — TELEPHONE (OUTPATIENT)
Age: 89
End: 2024-02-14

## 2024-02-14 NOTE — TELEPHONE ENCOUNTER
The patient call to report that she still have pain in her ankle. The patient had done her XR no fracture per the patient appear in the report.     The patient apply ice and  would like to know if she can take extra tylenol for the pain     Please contact the patient for advice

## 2024-02-14 NOTE — TELEPHONE ENCOUNTER
Spoke with Minerva  - per DC, ice, rest and elevation. Advil for pain.  No further action required.

## 2024-02-16 ENCOUNTER — TELEPHONE (OUTPATIENT)
Age: 89
End: 2024-02-16

## 2024-02-16 NOTE — TELEPHONE ENCOUNTER
Please call patient and let her know that she may alternate between advil and tylenol until pain subsides. She may also apply an ace bandage to affected ankle to reduce pain and swelling.

## 2024-02-16 NOTE — TELEPHONE ENCOUNTER
Pt evaluated s/p fall on 02/12/2024 by Dr. Sarabia.  She is feeling a little better, but her right ankle pain has only decreased to an 8/10 from a 10/10 at the time of the visit.  She needs to use a walker to ambulate. She states that she is using ice, elevation, compression and Extra Strength Tylenol TID.  She states that the tylenol helps a little, but she is wondering if there is anything else she can take for the pain?  Please advise and call her back.

## 2024-02-19 ENCOUNTER — OFFICE VISIT (OUTPATIENT)
Dept: FAMILY MEDICINE CLINIC | Facility: CLINIC | Age: 89
End: 2024-02-19
Payer: MEDICARE

## 2024-02-19 ENCOUNTER — TELEPHONE (OUTPATIENT)
Age: 89
End: 2024-02-19

## 2024-02-19 VITALS
HEIGHT: 64 IN | WEIGHT: 134 LBS | TEMPERATURE: 97.7 F | BODY MASS INDEX: 22.88 KG/M2 | RESPIRATION RATE: 20 BRPM | SYSTOLIC BLOOD PRESSURE: 150 MMHG | HEART RATE: 68 BPM | OXYGEN SATURATION: 98 % | DIASTOLIC BLOOD PRESSURE: 90 MMHG

## 2024-02-19 DIAGNOSIS — S93.401A SPRAIN OF RIGHT ANKLE, UNSPECIFIED LIGAMENT, INITIAL ENCOUNTER: ICD-10-CM

## 2024-02-19 DIAGNOSIS — M25.571 ACUTE RIGHT ANKLE PAIN: Primary | ICD-10-CM

## 2024-02-19 PROCEDURE — 99213 OFFICE O/P EST LOW 20 MIN: CPT | Performed by: NURSE PRACTITIONER

## 2024-02-19 RX ORDER — PREDNISONE 20 MG/1
20 TABLET ORAL 2 TIMES DAILY WITH MEALS
Qty: 6 TABLET | Refills: 0 | Status: SHIPPED | OUTPATIENT
Start: 2024-02-19 | End: 2024-02-22

## 2024-02-19 NOTE — ASSESSMENT & PLAN NOTE
Pt continues to have right ankle pain s/p fall almost 2 weeks ago. Using tylenol, ice and elevation without significant improvement.  Xray wnl - no fracture. Pt has good range of motion without significant pain so I do not feel a repeat xray is appropriate at this time.  Recommend continuing conservative thearpy but I do feel pt needs anti-inflammatory component to help health the joint.   Will trial a short burst of prednisone.  Start physical therapy and continue home exercises as tolerated.  Continue ice and elevation.  If no improvement in 2-4 weeks, consider further imaging with MRI.

## 2024-02-19 NOTE — PATIENT INSTRUCTIONS
Apply a compressive ACE bandage. Rest and elevate the affected painful area.  Apply cold compresses intermittently as needed.  As pain recedes, begin normal activities slowly as tolerated.  Call if symptoms persist.

## 2024-02-19 NOTE — PROGRESS NOTES
Assessment/Plan:    1. Acute right ankle pain  Assessment & Plan:  Pt continues to have right ankle pain s/p fall almost 2 weeks ago. Using tylenol, ice and elevation without significant improvement.  Xray wnl - no fracture. Pt has good range of motion without significant pain so I do not feel a repeat xray is appropriate at this time.  Recommend continuing conservative thearpy but I do feel pt needs anti-inflammatory component to help health the joint.   Will trial a short burst of prednisone.  Start physical therapy and continue home exercises as tolerated.  Continue ice and elevation.  If no improvement in 2-4 weeks, consider further imaging with MRI.     Orders:  -     predniSONE 20 mg tablet; Take 1 tablet (20 mg total) by mouth 2 (two) times a day with meals for 3 days  -     Ambulatory Referral to Physical Therapy; Future    2. Sprain of right ankle, unspecified ligament, initial encounter  -     predniSONE 20 mg tablet; Take 1 tablet (20 mg total) by mouth 2 (two) times a day with meals for 3 days  -     Ambulatory Referral to Physical Therapy; Future            Patient Instructions   Apply a compressive ACE bandage. Rest and elevate the affected painful area.  Apply cold compresses intermittently as needed.  As pain recedes, begin normal activities slowly as tolerated.  Call if symptoms persist.     Return if symptoms worsen or fail to improve.    Subjective:      Patient ID: Minerva Crockett is a 89 y.o. female.    Chief Complaint   Patient presents with    right ankle swollen swollen/fell 2/8 can't walk on it       Ankle Injury   The incident occurred more than 1 week ago (2 weeks ago). The incident occurred at home. The injury mechanism was a fall. The pain is present in the right ankle. The quality of the pain is described as aching. The pain is mild. The pain has been Constant since onset. Associated symptoms include an inability to bear weight. Pertinent negatives include no loss of motion, loss of  "sensation, numbness or tingling. She reports no foreign bodies present. The symptoms are aggravated by weight bearing. She has tried elevation, ice and acetaminophen for the symptoms. The treatment provided no relief.       The following portions of the patient's history were reviewed and updated as appropriate: allergies, current medications, past family history, past medical history, past social history, past surgical history and problem list.    Review of Systems   Constitutional:  Negative for chills, fatigue and fever.   Respiratory:  Negative for cough, chest tightness and shortness of breath.    Cardiovascular:  Positive for leg swelling. Negative for chest pain.   Musculoskeletal:  Positive for arthralgias and joint swelling.   Neurological:  Negative for tingling, weakness and numbness.         Current Outpatient Medications   Medication Sig Dispense Refill    amiodarone 100 mg tablet Take 100 mg by mouth daily      carvedilol (COREG) 25 mg tablet Take 25 mg by mouth 2 (two) times a day with meals      Cholecalciferol (Vitamin D-3) 25 MCG (1000 UT) CAPS Take by mouth daily      clindamycin (CLEOCIN) 300 MG capsule TAKE 600MG (2 CAPSULES) ONE HOUR PRIOR TO DENTAL WORK      losartan (COZAAR) 50 mg tablet 50 mg daily      predniSONE 20 mg tablet Take 1 tablet (20 mg total) by mouth 2 (two) times a day with meals for 3 days 6 tablet 0    Xarelto 15 MG tablet Take 15 mg by mouth daily       No current facility-administered medications for this visit.       Objective:    /90 (BP Location: Left arm, Patient Position: Sitting, Cuff Size: Standard)   Pulse 68   Temp 97.7 °F (36.5 °C) (Temporal)   Resp 20   Ht 5' 4\" (1.626 m)   Wt 60.8 kg (134 lb)   SpO2 98%   BMI 23.00 kg/m²        Physical Exam  Vitals reviewed.   Constitutional:       General: She is not in acute distress.     Appearance: She is well-developed. She is not diaphoretic.   HENT:      Head: Normocephalic and atraumatic.   Eyes:      " General: Lids are normal.         Right eye: No discharge.         Left eye: No discharge.      Conjunctiva/sclera: Conjunctivae normal.   Neck:      Thyroid: No thyromegaly.   Cardiovascular:      Rate and Rhythm: Normal rate and regular rhythm.      Heart sounds: Normal heart sounds.   Pulmonary:      Effort: Pulmonary effort is normal. No respiratory distress.      Breath sounds: Normal breath sounds. No decreased breath sounds, wheezing, rhonchi or rales.   Musculoskeletal:      Cervical back: Normal range of motion and neck supple.      Right lower le+ Edema present.      Left lower le+ Edema present.      Right ankle: Swelling and ecchymosis present. Tenderness present over the lateral malleolus. Normal range of motion. Normal pulse.      Left ankle: Ecchymosis present. No tenderness. Normal pulse.   Lymphadenopathy:      Cervical: No cervical adenopathy.   Skin:     General: Skin is warm and dry.      Findings: No rash.   Neurological:      Mental Status: She is alert and oriented to person, place, and time.   Psychiatric:         Behavior: Behavior normal.         Thought Content: Thought content normal.         Judgment: Judgment normal.                VIJAY Hodges

## 2024-02-26 ENCOUNTER — TELEPHONE (OUTPATIENT)
Age: 89
End: 2024-02-26

## 2024-02-26 NOTE — TELEPHONE ENCOUNTER
Spoke to Michael and scheduled a virtual appt for 2/27.    Explained the procedure of the virtual appt    No further action required

## 2024-02-26 NOTE — TELEPHONE ENCOUNTER
Sanjiv (son) called and would like a phone call from only Dr Anderson, would like to speak to DR about mother in process of going into a facility and would like to discuss Physical therapy  and other concerns. [phone]

## 2024-02-27 ENCOUNTER — TELEMEDICINE (OUTPATIENT)
Dept: FAMILY MEDICINE CLINIC | Facility: CLINIC | Age: 89
End: 2024-02-27
Payer: MEDICARE

## 2024-02-27 DIAGNOSIS — I10 PRIMARY HYPERTENSION: Primary | ICD-10-CM

## 2024-02-27 DIAGNOSIS — I44.2 AV BLOCK, 3RD DEGREE (HCC): ICD-10-CM

## 2024-02-27 DIAGNOSIS — M15.9 PRIMARY OSTEOARTHRITIS INVOLVING MULTIPLE JOINTS: ICD-10-CM

## 2024-02-27 DIAGNOSIS — C50.919 PRIMARY MALIGNANT NEOPLASM OF FEMALE BREAST (HCC): ICD-10-CM

## 2024-02-27 DIAGNOSIS — Z95.0 PACEMAKER: ICD-10-CM

## 2024-02-27 DIAGNOSIS — N18.31 STAGE 3A CHRONIC KIDNEY DISEASE (HCC): ICD-10-CM

## 2024-02-27 DIAGNOSIS — I27.20 PULMONARY HYPERTENSION (HCC): ICD-10-CM

## 2024-02-27 DIAGNOSIS — I48.92 PAROXYSMAL ATRIAL FLUTTER (HCC): ICD-10-CM

## 2024-02-27 PROCEDURE — 99214 OFFICE O/P EST MOD 30 MIN: CPT | Performed by: FAMILY MEDICINE

## 2024-02-27 NOTE — PROGRESS NOTES
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)    Paroxysmal atrial flutter (HCC)     STABLE  FOLLOWED BY CARDIOLOGY         Pulmonary hypertension (HCC)     STABLE              Musculoskeletal and Integument    Primary osteoarthritis involving multiple joints     STABLE  DENIES ANY JOINT SWELLING OR REDNESS  JOINT STIFFNESS PRESENT  PAIN MANAGEMENT ADEQUATE    - CONTINUE CURRENT MANAGEMENT  - MEDICATION AS DIRECTED  - CALL / RETURN IF SYMPTOMS WORSEN              Genitourinary    Stage 3a chronic kidney disease (HCC)       Other    Pacemaker    Primary malignant neoplasm of female breast (HCC)            Reason for visit is   Chief Complaint   Patient presents with   • Virtual Regular Visit          Encounter provider Zaki Anderson MD    Provider located at [address] [id number]      Recent Visits  No visits were found meeting these conditions.  Showing recent visits within past 7 days and meeting all other requirements  Today's Visits  Date Type Provider Dept   02/27/24 Telemedicine Zaki Anderson MD [facility]   Showing today's visits and meeting all other requirements  Future Appointments  No visits were found meeting these conditions.  Showing future appointments within next 150 days and meeting all other requirements       The patient was identified by name and date of birth. Minerva Crockett was informed that  this is a telemedicine visit and that the visit is being conducted through the Epic Embedded platform. She agrees to proceed..  My office door was closed. No one else was in the room.  She acknowledged consent and understanding of privacy and security of the video platform. The patient has agreed to participate and understands they can discontinue the visit at any time.    Patient is aware this is a billable service.     Lety Crockett is a 89 y.o. female    .      PATIENT RETURNS FOR ROUTINE EVALUATION OF PATIENT'S MEDICAL ISSUES    INDIVIDUAL MEDICAL ISSUES WITH THEIR CURRENT STATUS, ASSESSMENT AND PLANS ARE LISTED ABOVE      EVALUATION FOR ENTRANCE TO ASSISTED LIVING INTAKE    FORMS WERE REVIEWED AND WILL BE COMPLETED         Past Medical History:   Diagnosis Date   • Essential hypertension     LAST ASSESSED: 4/27/15   • Malignant neoplasm of breast (HCC)     RIGHT; LAST ASSESSED: 4/27/15   • Osteoporosis     LAST ASSESSED: 4/27/15   • Pacemaker 04/15/2021       Past Surgical History:   Procedure Laterality Date   • BLADDER SURGERY     • BREAST LUMPECTOMY      LAST ASSESSED: 4/27/15   • HIP SURGERY Right 04/16/2021       Current Outpatient Medications   Medication Sig Dispense Refill   • amiodarone 100 mg tablet Take 100 mg by mouth daily     • carvedilol (COREG) 25 mg tablet Take 25 mg by mouth 2 (two) times a day with meals     • Cholecalciferol (Vitamin D-3) 25 MCG (1000 UT) CAPS Take by mouth daily     • clindamycin (CLEOCIN) 300 MG capsule TAKE 600MG (2 CAPSULES) ONE HOUR PRIOR TO DENTAL WORK     • losartan (COZAAR) 50 mg tablet 50 mg daily     • Xarelto 15 MG tablet Take 15 mg by mouth daily       No current facility-administered medications for this visit.        Allergies   Allergen Reactions   • Penicillins Hives   • Sulfa Antibiotics Other (See Comments)     Pt doesn't remember       Review of Systems   Constitutional:  Negative for chills, fatigue and fever.   HENT:  Negative for congestion,  ear discharge, ear pain, mouth sores, postnasal drip, sore throat and trouble swallowing.    Eyes:  Negative for pain, discharge and visual disturbance.   Respiratory:  Negative for cough, shortness of breath and wheezing.    Cardiovascular:  Negative for chest pain, palpitations and leg swelling.   Gastrointestinal:  Negative for abdominal distention, abdominal pain, blood in stool, diarrhea and nausea.   Endocrine: Negative for polydipsia, polyphagia and polyuria.   Genitourinary:  Negative for dysuria, frequency, hematuria and urgency.   Musculoskeletal:  Positive for arthralgias. Negative for gait problem and joint swelling.   Skin:  Negative for pallor and rash.   Neurological:  Negative for dizziness, syncope, speech difficulty, weakness, light-headedness, numbness and headaches.   Hematological:  Negative for adenopathy.   Psychiatric/Behavioral:  Negative for behavioral problems, confusion and sleep disturbance. The patient is not nervous/anxious.        Video Exam    There were no vitals filed for this visit.    Physical Exam     PATIENT VISUALLY APPEARS WELL IN NO OBVIOUS DISTRESS      Visit Time  Total Visit Duration: 18

## 2024-02-27 NOTE — PATIENT INSTRUCTIONS
CONTINUE CURRENT TREATMENT PLAN  MONITOR DIETARY SODIUM, CHOL INTAKE  ENCOURAGE PHYSICAL ACTIVITY    FORMS FOR ASSISTED LIVING WILL BE COMPLETED

## 2024-02-27 NOTE — TELEPHONE ENCOUNTER
Faxed forms completed by Dr Anderson to [facility] at fax# [phone] as requested.  No further action required

## 2024-02-27 NOTE — TELEPHONE ENCOUNTER
Jesika from [facility] was calling to provide fax number for the forms filled out today - please fax to [phone]

## 2024-03-01 DIAGNOSIS — M15.9 PRIMARY OSTEOARTHRITIS INVOLVING MULTIPLE JOINTS: Primary | ICD-10-CM

## 2024-03-03 RX ORDER — ACETAMINOPHEN 325 MG/1
TABLET ORAL
Qty: 60 TABLET | Refills: 11 | Status: SHIPPED | OUTPATIENT
Start: 2024-03-03

## 2024-03-07 ENCOUNTER — OFFICE VISIT (OUTPATIENT)
Dept: OBGYN CLINIC | Facility: CLINIC | Age: 89
End: 2024-03-07
Payer: MEDICARE

## 2024-03-07 VITALS
BODY MASS INDEX: 23 KG/M2 | HEART RATE: 60 BPM | DIASTOLIC BLOOD PRESSURE: 81 MMHG | HEIGHT: 64 IN | SYSTOLIC BLOOD PRESSURE: 132 MMHG

## 2024-03-07 DIAGNOSIS — M48.56XA NONTRAUMATIC COMPRESSION FRACTURE OF L5 VERTEBRA, INITIAL ENCOUNTER (HCC): ICD-10-CM

## 2024-03-07 DIAGNOSIS — S22.000A COMPRESSION FRACTURE OF BODY OF THORACIC VERTEBRA (HCC): ICD-10-CM

## 2024-03-07 DIAGNOSIS — S93.491A SPRAIN OF ANTERIOR TALOFIBULAR LIGAMENT OF RIGHT ANKLE, INITIAL ENCOUNTER: Primary | ICD-10-CM

## 2024-03-07 PROCEDURE — 99203 OFFICE O/P NEW LOW 30 MIN: CPT | Performed by: ORTHOPAEDIC SURGERY

## 2024-03-07 NOTE — PROGRESS NOTES
Ortho Sports Medicine New Patient Ankle Visit    Assesment:  89 y.o. female right ankle sprain    Plan:    We had a long discussion regarding the diagnosis and treatment plan. As Minerva's symptoms continue to gradually improve and she demonstrates great ankle motion/strength on exam today in the setting of minimal ATFL tenderness to palpation, I recommended continuing course of conservative treatment. This includes Tylenol q8h as needed, icing as needed, compression with an ACE wrap, and PT at her [facility]. She states that caretakers are available at her facility to bring her Tylenol in the early morning if needed. We discussed that continued physical therapy will focus on normalizing motion and progressing to strengthening and proprioception. She will continue to use her walker to assist with ambulation and wear supportive shoes.     Regarding her low back pain with thoracic and lumbar compression fractures noted on imaging reports, I recommended the patient follow up with Neurosurgery for further management. She will continue PT and using lidocaine patches as needed for pain.     Follow-up: as needed      Chief Complaint   Patient presents with    Right Ankle - Pain       History of Present Illness:    The patient is a 89 y.o. female, presenting for initial evaluation of right ankle pain s/p fall on 2/6/24. The patient arrived in a wheelchair via transport from [facility]. The patient reports an initial sharp, shooting pain in the ankle following her fall, but notes pain has gradually improved such that she bears full weight and ambulates without significant pain. She uses a walker to ambulate at baseline. Minerva notes a throbbing pain is most bothersome upon waking up in the morning. She denies swelling beyond what is typical for her throughout the lower extremities and numbness/tingling.  The patient was evaluated by her PCP on 2/12/24 where x-rays were obtained and negative for  fracture. The patient has been using Tylenol as needed for pain, but states pain would be better controlled if she had Tylenol available to her upon waking up in the morning. She has been using an ACE wrap for compression and working with PT at her facility.     Regarding her low back, the patient reports achy pain upon waking up and getting out of bed in the morning. PT applies a lidocaine patch with adequate pain relief. The patient denies bowel/bladder incontinence, saddle anesthesia, numbness/tingling, or history of low back pain.    The patient states her family is nearby and she is pleased with her care at [facility].       Ankle Surgical History:  None      Past Medical, Social and Family History:  Past Medical History:   Diagnosis Date    Essential hypertension     LAST ASSESSED: 4/27/15    Malignant neoplasm of breast (HCC)     RIGHT; LAST ASSESSED: 4/27/15    Osteoporosis     LAST ASSESSED: 4/27/15    Pacemaker 04/15/2021     Past Surgical History:   Procedure Laterality Date    BLADDER SURGERY      BREAST LUMPECTOMY      LAST ASSESSED: 4/27/15    HIP SURGERY Right 04/16/2021     Allergies   Allergen Reactions    Penicillins Hives    Sulfa Antibiotics Other (See Comments)     Pt doesn't remember     Current Outpatient Medications on File Prior to Visit   Medication Sig Dispense Refill    acetaminophen (TYLENOL) 325 mg tablet TAKE 2 TABLETS (650MG) ORALLY EVERY 8 HOURS AS NEEDED FOR PAIN *NOT TO EXCEED 3GM APAP/24HR* 60 tablet 11    amiodarone 100 mg tablet Take 100 mg by mouth daily      carvedilol (COREG) 25 mg tablet Take 25 mg by mouth 2 (two) times a day with meals      Cholecalciferol (Vitamin D-3) 25 MCG (1000 UT) CAPS Take by mouth daily      clindamycin (CLEOCIN) 300 MG capsule TAKE 600MG (2 CAPSULES) ONE HOUR PRIOR TO DENTAL WORK      losartan (COZAAR) 50 mg tablet 50 mg daily      Xarelto 15 MG tablet Take 15 mg by mouth daily       No current facility-administered medications on file prior  to visit.     Social History     Socioeconomic History    Marital status:      Spouse name: Not on file    Number of children: Not on file    Years of education: Not on file    Highest education level: Not on file   Occupational History    Not on file   Tobacco Use    Smoking status: Never    Smokeless tobacco: Never   Vaping Use    Vaping status: Never Used   Substance and Sexual Activity    Alcohol use: No     Comment: ALCOHOL USE: WINE ON OCCASION, OCCASIONAL ALCOHOL USE AS PER [facility]    Drug use: No    Sexual activity: Not on file   Other Topics Concern    Not on file   Social History Narrative    CAFFEINE USE    DENTAL CARE, REGULARLY    DRINKS COFFEE    TEA     Social Determinants of Health     Financial Resource Strain: Low Risk  (12/15/2023)    Overall Financial Resource Strain (CARDIA)     Difficulty of Paying Living Expenses: Not hard at all   Food Insecurity: Not on file   Transportation Needs: No Transportation Needs (12/15/2023)    PRAPARE - Transportation     Lack of Transportation (Medical): No     Lack of Transportation (Non-Medical): No   Physical Activity: Not on file   Stress: Not on file   Social Connections: Not on file   Intimate Partner Violence: Not on file   Housing Stability: Not on file         I have reviewed the past medical, surgical, social and family history, medications and allergies as documented in the EMR.    Review of systems: ROS is negative other than that noted in the HPI.  Constitutional: Negative for fatigue and fever.   HENT: Negative for sore throat.    Respiratory: Negative for shortness of breath.    Cardiovascular: Negative for chest pain.   Gastrointestinal: Negative for abdominal pain.   Endocrine: Negative for cold intolerance and heat intolerance.   Genitourinary: Negative for flank pain.   Musculoskeletal: Negative for back pain.   Skin: Negative for rash.   Allergic/Immunologic: Negative for immunocompromised state.   Neurological: Negative for  "dizziness.   Psychiatric/Behavioral: Negative for agitation.      Physical Exam:    Height 5' 4\" (1.626 m).    General/Constitutional: NAD, well developed, well nourished  HENT: Normocephalic, atraumatic  CV: Intact distal pulses, regular rate  Resp: No respiratory distress or labored breathing  Lymphatic: No lymphadenopathy palpated  Neuro: Alert and Oriented x 3, no focal deficits  Psych: Normal mood, normal affect, normal judgement, normal behavior  Skin: Warm, dry, no rashes, no erythema       Ankle Examination (focused):     No wounds or ecchymosis  Soft tissue swelling over the lateral ankle and throughout the lower extremities bilaterally - baseline per patient  ROM: full, painless DF/PF, inversion/eversion    Mild ATFL tenderness. No other ligamentous or bony tenderness    Gait: not assessed - in wheelchair    No subluxation of the peroneal tendons or tenderness to palpation along the peroneal tendons     No pain with palpation or range of motion of midfoot and forefoot bilaterally    No calf tenderness to palpation bilaterally    LE NV Exam: +2 DP/PT pulses bilaterally  Sensation intact to light touch L2-S1 bilaterally        Ankle Imaging:    X-rays of the right ankle were reviewed, which demonstrate no acute osseous abnormalities or significant degenerative changes.     X-ray repot of the lumbar spine reviewed demonstrate FINDINGS:  There is degenerative disc space narrowing at L1-2, L2-3 L4-5 and to a lesser extent L5-S1.     The vertebral body heights are preserved and there is normal alignment.     However, there appears to be mild compression deformity of T12.      Scribe Attestation      I,:  Valery Preston PA-C am acting as a scribe while in the presence of the attending physician.:       I,:  Mike Mesa MD personally performed the services described in this documentation    as scribed in my presence.:           "

## 2024-03-25 ENCOUNTER — HOSPITAL ENCOUNTER (OUTPATIENT)
Dept: RADIOLOGY | Facility: HOSPITAL | Age: 89
Discharge: HOME | End: 2024-03-25
Payer: MEDICARE

## 2024-03-25 ENCOUNTER — OFFICE VISIT (OUTPATIENT)
Dept: CARDIOLOGY CLINIC | Facility: CLINIC | Age: 89
End: 2024-03-25
Payer: MEDICARE

## 2024-03-25 VITALS
WEIGHT: 132.2 LBS | DIASTOLIC BLOOD PRESSURE: 68 MMHG | HEART RATE: 61 BPM | BODY MASS INDEX: 22.69 KG/M2 | SYSTOLIC BLOOD PRESSURE: 118 MMHG | OXYGEN SATURATION: 97 %

## 2024-03-25 DIAGNOSIS — Z79.899 LONG TERM CURRENT USE OF AMIODARONE: ICD-10-CM

## 2024-03-25 DIAGNOSIS — I44.2 AV BLOCK, 3RD DEGREE (HCC): ICD-10-CM

## 2024-03-25 DIAGNOSIS — Z95.0 PACEMAKER: ICD-10-CM

## 2024-03-25 DIAGNOSIS — I10 PRIMARY HYPERTENSION: ICD-10-CM

## 2024-03-25 DIAGNOSIS — I48.92 PAROXYSMAL ATRIAL FLUTTER (HCC): Primary | ICD-10-CM

## 2024-03-25 PROCEDURE — 93000 ELECTROCARDIOGRAM COMPLETE: CPT | Performed by: INTERNAL MEDICINE

## 2024-03-25 PROCEDURE — 99203 OFFICE O/P NEW LOW 30 MIN: CPT | Performed by: INTERNAL MEDICINE

## 2024-03-25 PROCEDURE — 71046 X-RAY EXAM CHEST 2 VIEWS: CPT

## 2024-03-25 NOTE — PROGRESS NOTES
Cardiology New Patient Visit     Minerva Crockett  [id number]  8/20/1930  [facility] -MOB  [facility] [place]  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)    Pacemaker    Long term current use of amiodarone  -     Comprehensive metabolic panel  -     TSH, 3rd generation with Free T4 reflex; Future  -     XR chest pa & lateral; Future      I had the pleasure of seeing Minerva Crockett who presents to establish care/new visit    History of the Presenting Illness, Discussion/Summary and My Plan are as follows:::    Minerva is a pleasant 89-year-old lady with hypertension, no diabetes, no dyslipidemia, history of stress induced cardiomyopathy-about 5 years ago, in [place], status post permanent pacemaker implantation for complete heart block-Medtronic device-around 2020 although more recent notes have stated ICD, paroxysmal atrial fibrillation/flutter maintained on a rhythm control strategy on amiodarone 100 mg daily and on anticoagulation with Xarelto.    She is physically modestly active, gets around with a walker, lives at [facility] without any limitations from a cardiac standpoint.  Had a mechanical fall in February resulting in a right ankle sprain that is slowly improving.    She does not recall any recent heart failure hospitalizations.  Denies a prior history of coronary artery disease stenting or bypass surgery.    Cardiac exam is unremarkable  ECG shows AV dual paced rhythm  Cardiology office notes from [place] reviewed    Plan:    History of stress cardiomyopathy: Based on office notes from [place], last echo was in June 2021 and EF is 50% and had improved from prior, I do not have these images or the report itself.  Considering that she is asymptomatic with no evidence  of CHF, will not repeat an echo at this time.  Maintained on beta-blocker and ARB, euvolemic at this time    History of complete heart block: Status post permanent pacemaker implantation-Medtronic device-however on last few notes, reported as' ICD interrogation', she does not recall having had a repeat procedure.  I will simply set her up for interrogations through our office    Hypertension: Controlled, likely has CKD - see numbers below    Paroxysmal atrial fibrillation/flutter: On Xarelto for anticoagulation, on carvedilol 25 mg twice daily, no changes at this time, on rhythm control with amiodarone.  No bleeding issues, no more falls other than described above, check CBC along with other blood work-see below    Long-term amiodarone use: Has been on it for about 4 years per her report, will check TSH, CMP, chest x-ray (instead of spirometry since she is 89), she is on low-dose Amio at 100 mg daily    Follow-up in 6 months     Latest Reference Range & Units 10/14/19 08:09 02/26/20 14:56 05/08/23 14:35 06/15/23 10:38   BUN 10 - 36 mg/dL 31 (H) 27 34 29   Creatinine 0.57 - 1.00 mg/dL 1.06 1.20 (H) 1.61 (H) 1.26 (H)   (H): Data is abnormally high     Latest Reference Range & Units 05/25/16 07:47 10/11/18 10:18   Cholesterol 50 - 200 mg/dL 166 177   Triglycerides <=150 mg/dL 160 (H) 107   HDL 40 - 60 mg/dL 57 61 (H)   Non-HDL Cholesterol mg/dl  116   LDL Calculated 0 - 100 mg/dL 77 95   (H): Data is abnormally high    1. Paroxysmal atrial flutter (HCC)  POCT ECG    CBC      2. Primary hypertension        3. 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 Pacemaker        5. 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)    Pacemaker    Paroxysmal  atrial flutter (HCC)    Primary malignant neoplasm of female breast (HCC)    Stage 3a chronic kidney disease (HCC)    Pulmonary hypertension (HCC)    Primary osteoarthritis involving multiple joints    Acute right ankle pain     Past Medical History:   Diagnosis Date    Essential hypertension     LAST ASSESSED: 4/27/15    Malignant neoplasm of breast (HCC)     RIGHT; LAST ASSESSED: 4/27/15    Osteoporosis     LAST ASSESSED: 4/27/15    Pacemaker 04/15/2021     Social History     Socioeconomic History    Marital status:      Spouse name: Not on file    Number of children: Not on file    Years of education: Not on file    Highest education level: Not on file   Occupational History    Not on file   Tobacco Use    Smoking status: Never    Smokeless tobacco: Never   Vaping Use    Vaping status: Never Used   Substance and Sexual Activity    Alcohol use: No     Comment: ALCOHOL USE: WINE ON OCCASION, OCCASIONAL ALCOHOL USE AS PER [facility]    Drug use: No    Sexual activity: Not on file   Other Topics Concern    Not on file   Social History Narrative    CAFFEINE USE    DENTAL CARE, REGULARLY    DRINKS COFFEE    TEA     Social Determinants of Health     Financial Resource Strain: Low Risk  (12/15/2023)    Overall Financial Resource Strain (CARDIA)     Difficulty of Paying Living Expenses: Not hard at all   Food Insecurity: Not on file   Transportation Needs: No Transportation Needs (12/15/2023)    PRAPARE - Transportation     Lack of Transportation (Medical): No     Lack of Transportation (Non-Medical): No   Physical Activity: Not on file   Stress: Not on file   Social Connections: Not on file   Intimate Partner Violence: Not on file   Housing Stability: Not on file      Family History   Problem Relation Age of Onset    Stroke Mother         CEREBROVASCULAR ACCIDENT    Hypertension Mother     Ovarian cancer Mother     Pancreatic cancer Mother         PANCREAS CARCINOMA    Pancreatic cancer Brother         PANCREAS  CARCINOMA    Bipolar disorder Other     Substance Abuse Neg Hx     Mental illness Neg Hx      Past Surgical History:   Procedure Laterality Date    BLADDER SURGERY      BREAST LUMPECTOMY      LAST ASSESSED: 4/27/15    HIP SURGERY Right 04/16/2021       Current Outpatient Medications:     acetaminophen (TYLENOL) 325 mg tablet, TAKE 2 TABLETS (650MG) ORALLY EVERY 8 HOURS AS NEEDED FOR PAIN *NOT TO EXCEED 3GM APAP/24HR*, Disp: 60 tablet, Rfl: 11    amiodarone 100 mg tablet, Take 100 mg by mouth daily, Disp: , Rfl:     carvedilol (COREG) 25 mg tablet, Take 25 mg by mouth 2 (two) times a day with meals, Disp: , Rfl:     Cholecalciferol (Vitamin D-3) 25 MCG (1000 UT) CAPS, Take by mouth daily, Disp: , Rfl:     losartan (COZAAR) 50 mg tablet, 50 mg daily, Disp: , Rfl:     Xarelto 15 MG tablet, Take 15 mg by mouth daily, Disp: , Rfl:     clindamycin (CLEOCIN) 300 MG capsule, TAKE 600MG (2 CAPSULES) ONE HOUR PRIOR TO DENTAL WORK (Patient not taking: Reported on 3/25/2024), Disp: , Rfl:   Allergies   Allergen Reactions    Penicillins Hives    Sulfa Antibiotics Other (See Comments)     Pt doesn't remember     Vitals:    03/25/24 1003   BP: 118/68   BP Location: Left arm   Patient Position: Sitting   Cuff Size: Adult   Pulse: 61   SpO2: 97%   Weight: 60 kg (132 lb 3.2 oz)           Imaging: XR ankle 3+ vw right    Result Date: 3/7/2024  Narrative: [id number]    XR spine lumbar minimum 4 views non injury    Result Date: 3/7/2024  Narrative: [id number]      Review of Systems:  Review of Systems   Constitutional: Negative.    HENT: Negative.     Eyes: Negative.    Respiratory: Negative.     Cardiovascular: Negative.    Endocrine: Negative.    Musculoskeletal:  Positive for arthralgias and gait problem. Negative for back pain.       Physical Exam:  /68 (BP Location: Left arm, Patient Position: Sitting, Cuff Size: Adult)   Pulse 61    "Wt 60 kg (132 lb 3.2 oz)   SpO2 97%   BMI 22.69 kg/m²   Physical Exam  Constitutional:       General: She is not in acute distress.     Appearance: She is not ill-appearing, toxic-appearing or diaphoretic.   HENT:      Nose: Nose normal. No congestion or rhinorrhea.   Neck:      Vascular: No carotid bruit.   Cardiovascular:      Pulses: Normal pulses.      Heart sounds: No murmur heard.     No friction rub. No gallop.   Pulmonary:      Effort: Pulmonary effort is normal. No respiratory distress.      Breath sounds: No stridor. No wheezing or rhonchi.   Abdominal:      General: Abdomen is flat. Bowel sounds are normal. There is no distension.      Palpations: There is no mass.      Tenderness: There is no abdominal tenderness.      Hernia: No hernia is present.   Musculoskeletal:         General: No swelling, tenderness or deformity. Normal range of motion.      Cervical back: Normal range of motion. No rigidity or tenderness.   Lymphadenopathy:      Cervical: No cervical adenopathy.   Skin:     General: Skin is warm.      Coloration: Skin is not jaundiced or pale.      Findings: No bruising or erythema.   Neurological:      Mental Status: She is alert.         This note was completed in part utilizing [employer] direct voice recognition software.   Grammatical errors, random word insertion, spelling mistakes, occasional wrong word or \"sound-alike\" substitutions and incomplete sentences may be an occasional consequence of the system secondary to software limitations, ambient noise and hardware issues. At the time of dictation, efforts were made to edit, clarify and /or correct errors.  Please read the chart carefully and recognize, using context, where substitutions have occurred.  If you have any questions or concerns about the context, text or information contained within the body of this dictation, please contact myself, the provider, for further clarification.  "

## 2024-04-03 DIAGNOSIS — E55.9 VITAMIN D DEFICIENCY: Primary | ICD-10-CM

## 2024-04-03 RX ORDER — MULTIVIT-MIN/IRON/FOLIC ACID/K 18-600-40
CAPSULE ORAL
Qty: 30 TABLET | Refills: 5 | Status: SHIPPED | OUTPATIENT
Start: 2024-04-03

## 2024-04-03 NOTE — TELEPHONE ENCOUNTER
Requested medication(s) are due for refill today: Yes  Patient has already received a courtesy refill: No  Other reason request has been forwarded to provider:   
Yes

## 2024-04-05 ENCOUNTER — HOSPITAL ENCOUNTER (EMERGENCY)
Facility: HOSPITAL | Age: 89
Discharge: HOME | End: 2024-04-05
Attending: EMERGENCY MEDICINE
Payer: MEDICARE

## 2024-04-05 VITALS
TEMPERATURE: 97.3 F | HEART RATE: 60 BPM | DIASTOLIC BLOOD PRESSURE: 72 MMHG | RESPIRATION RATE: 18 BRPM | SYSTOLIC BLOOD PRESSURE: 159 MMHG | OXYGEN SATURATION: 96 %

## 2024-04-05 DIAGNOSIS — S03.00XA CLOSED DISLOCATION OF JAW, INITIAL ENCOUNTER: Primary | ICD-10-CM

## 2024-04-05 PROCEDURE — 99152 MOD SED SAME PHYS/QHP 5/>YRS: CPT | Performed by: EMERGENCY MEDICINE

## 2024-04-05 PROCEDURE — 99283 EMERGENCY DEPT VISIT LOW MDM: CPT

## 2024-04-05 PROCEDURE — 99285 EMERGENCY DEPT VISIT HI MDM: CPT | Performed by: EMERGENCY MEDICINE

## 2024-04-05 PROCEDURE — 21480 CLTX TMPRMAND DISLC 1ST/SBSQ: CPT | Performed by: EMERGENCY MEDICINE

## 2024-04-05 RX ORDER — MIDAZOLAM HYDROCHLORIDE 2 MG/2ML
3 INJECTION, SOLUTION INTRAMUSCULAR; INTRAVENOUS ONCE
Status: COMPLETED | OUTPATIENT
Start: 2024-04-05 | End: 2024-04-05

## 2024-04-05 RX ORDER — CARVEDILOL 12.5 MG/1
25 TABLET ORAL 2 TIMES DAILY WITH MEALS
Status: DISCONTINUED | OUTPATIENT
Start: 2024-04-05 | End: 2024-04-05

## 2024-04-05 RX ORDER — CARVEDILOL 12.5 MG/1
25 TABLET ORAL ONCE
Status: DISCONTINUED | OUTPATIENT
Start: 2024-04-05 | End: 2024-04-05 | Stop reason: HOSPADM

## 2024-04-05 RX ORDER — ACETAMINOPHEN 325 MG/1
650 TABLET ORAL EVERY 8 HOURS PRN
Status: DISCONTINUED | OUTPATIENT
Start: 2024-04-05 | End: 2024-04-05 | Stop reason: HOSPADM

## 2024-04-05 RX ORDER — PROPOFOL 10 MG/ML
INJECTION, EMULSION INTRAVENOUS
Status: COMPLETED
Start: 2024-04-05 | End: 2024-04-05

## 2024-04-05 RX ORDER — LOSARTAN POTASSIUM 50 MG/1
50 TABLET ORAL ONCE
Status: DISCONTINUED | OUTPATIENT
Start: 2024-04-05 | End: 2024-04-05 | Stop reason: HOSPADM

## 2024-04-05 RX ORDER — PROPOFOL 10 MG/ML
50 INJECTION, EMULSION INTRAVENOUS ONCE
Status: COMPLETED | OUTPATIENT
Start: 2024-04-05 | End: 2024-04-05

## 2024-04-05 RX ORDER — AMIODARONE HYDROCHLORIDE 100 MG/1
100 TABLET ORAL ONCE
Status: DISCONTINUED | OUTPATIENT
Start: 2024-04-05 | End: 2024-04-05 | Stop reason: HOSPADM

## 2024-04-05 RX ADMIN — PROPOFOL 50 MG: 10 INJECTION, EMULSION INTRAVENOUS at 03:50

## 2024-04-05 RX ADMIN — MIDAZOLAM 3 MG: 1 INJECTION INTRAMUSCULAR; INTRAVENOUS at 03:43

## 2024-04-05 NOTE — ED ATTENDING ATTESTATION
4/5/2024  I, Mike Villa MD, saw and evaluated the patient. I have discussed the patient with the resident/non-physician practitioner and agree with the resident's/non-physician practitioner's findings, Plan of Care, and MDM as documented in the resident's/non-physician practitioner's note, except where noted. All available labs and Radiology studies were reviewed.  I was present for key portions of any procedure(s) performed by the resident/non-physician practitioner and I was immediately available to provide assistance.       At this point I agree with the current assessment done in the Emergency Department.  I have conducted an independent evaluation of this patient a history and physical is as follows: Patient is a 89 year old female who got up tonight and yawned and her jaw dislocated and patient cannot close her mouth. Has had prior episode. No sob. No trauma. No N/V. Was last seen at  Cardiology in [place] on 3/25/24 for paroxysmal atrial flutter. [email or web address] website checked on this patient and last Rx filled was on 3/31/24 for vitamin D3 for 25 day supply. NCAT. No scleral icterus. Unable to close jaw bilaterally. Neck supple. Lungs clear. Heart regular without murmur. Abdomen soft and nontender. Good bowel sounds. (+) bilateral LE edema. No rash noted. DDx including but not limited to: spontaneous jaw dislocation, TMJ disorder; doubt fx. Will give IV versed and reduce dislocation.     ED Course         Critical Care Time  Procedures

## 2024-04-05 NOTE — ED PROVIDER NOTES
History  Chief Complaint   Patient presents with    Medical Problem     Pt arrives via EMS from [facility] ([facility]] living. Pt woke up this morning and yawned and can now not close her mouth. This happened once before about 50 years ago.      89-year-old female with history of HTN, permanent pacemaker status post complete heart block, paroxysmal atrial fibrillation maintained on amnio and Eliquis, history of breast cancer, CKD presenting to the ED with complaints of inability to open and close her mouth, with anterior displacement of her mandible.  Patient states that this happened about an hour prior to arrival, when she yawned.  N.p.o. since around dinnertime.  Has not been able to fully close her mouth since, or tolerate p.o. intake.  Has some difficulty with speaking and swallowing.  Pain currently 5/10.  Otherwise denies fever, chills, headache, numbness/tingling, injury or trauma to the face, neck pain/stiffness, chest pain, shortness of breath, abdominal pain, nausea/vomiting.         Prior to Admission Medications   Prescriptions Last Dose Informant Patient Reported? Taking?   Cholecalciferol (Vitamin D-3) 25 MCG (1000 UT) CAPS 4/4/2024 Self Yes Yes   Sig: Take by mouth daily   D3-1000 25 MCG (1000 UT) tablet   No No   Sig: TAKE 1 TABLET ORALLY DAILY (VITAMIN D DEFICIENCY) *REORDER*   Xarelto 15 MG tablet  Self Yes No   Sig: Take 15 mg by mouth daily   acetaminophen (TYLENOL) 325 mg tablet  Self No No   Sig: TAKE 2 TABLETS (650MG) ORALLY EVERY 8 HOURS AS NEEDED FOR PAIN *NOT TO EXCEED 3GM APAP/24HR*   amiodarone 100 mg tablet 4/4/2024 Self Yes Yes   Sig: Take 100 mg by mouth daily   carvedilol (COREG) 25 mg tablet 4/4/2024 Self Yes Yes   Sig: Take 25 mg by mouth 2 (two) times a day with meals   clindamycin (CLEOCIN) 300 MG capsule  Self Yes No   Sig: TAKE 600MG (2 CAPSULES) ONE HOUR PRIOR TO DENTAL WORK   Patient not taking: Reported on 3/25/2024   losartan (COZAAR) 50 mg tablet 4/4/2024 Self Yes  Yes   Si mg daily      Facility-Administered Medications: None       Past Medical History:   Diagnosis Date    Essential hypertension     LAST ASSESSED: 4/27/15    Malignant neoplasm of breast (HCC)     RIGHT; LAST ASSESSED: 4/27/15    Osteoporosis     LAST ASSESSED: 4/27/15    Pacemaker 04/15/2021       Past Surgical History:   Procedure Laterality Date    BLADDER SURGERY      BREAST LUMPECTOMY      LAST ASSESSED: 4/27/15    HIP SURGERY Right 2021       Family History   Problem Relation Age of Onset    Stroke Mother         CEREBROVASCULAR ACCIDENT    Hypertension Mother     Ovarian cancer Mother     Pancreatic cancer Mother         PANCREAS CARCINOMA    Pancreatic cancer Brother         PANCREAS CARCINOMA    Bipolar disorder Other     Substance Abuse Neg Hx     Mental illness Neg Hx      I have reviewed and agree with the history as documented.    E-Cigarette/Vaping    E-Cigarette Use Never User      E-Cigarette/Vaping Substances    Nicotine No     THC No     CBD No     Flavoring No     Other No     Unknown No      Social History     Tobacco Use    Smoking status: Never    Smokeless tobacco: Never   Vaping Use    Vaping status: Never Used   Substance Use Topics    Alcohol use: No     Comment: ALCOHOL USE: WINE ON OCCASION, OCCASIONAL ALCOHOL USE AS PER [facility]    Drug use: No        Review of Systems   Constitutional:  Negative for chills and fever.   HENT:  Positive for trouble swallowing. Negative for congestion, ear pain and sore throat.         Reports jaw pain   Eyes:  Negative for photophobia, pain, redness and visual disturbance.   Respiratory:  Negative for cough, chest tightness, shortness of breath and stridor.    Cardiovascular:  Negative for chest pain and palpitations.   Gastrointestinal:  Negative for abdominal pain, constipation, diarrhea, nausea and vomiting.   Genitourinary:  Negative for difficulty urinating, dysuria, flank pain, frequency, hematuria, pelvic pain, urgency, vaginal  bleeding, vaginal discharge and vaginal pain.   Musculoskeletal:  Negative for back pain, neck pain and neck stiffness.   Skin:  Negative for color change and rash.   Neurological:  Negative for dizziness, tremors, seizures, syncope, facial asymmetry, speech difficulty, weakness, light-headedness, numbness and headaches.   All other systems reviewed and are negative.      Physical Exam  ED Triage Vitals [04/05/24 0240]   Temperature Pulse Respirations Blood Pressure SpO2   (!) 97.3 °F (36.3 °C) 79 18 159/95 95 %      Temp Source Heart Rate Source Patient Position - Orthostatic VS BP Location FiO2 (%)   Oral Monitor -- Left arm --      Pain Score       7             Orthostatic Vital Signs  Vitals:    04/05/24 0415 04/05/24 0420 04/05/24 0430 04/05/24 0545   BP: 123/63 123/65 144/65 159/71   Pulse: 60 60 60 61       Physical Exam  Vitals and nursing note reviewed.   Constitutional:       General: She is in acute distress.      Appearance: She is well-developed. She is ill-appearing (Chronically ill-appearing.). She is not toxic-appearing.   HENT:      Head: Normocephalic and atraumatic. No raccoon eyes, right periorbital erythema, left periorbital erythema or laceration.      Jaw: Trismus, tenderness, pain on movement and malocclusion present. No swelling.      Right Ear: Tympanic membrane, ear canal and external ear normal. No hemotympanum.      Left Ear: Tympanic membrane, ear canal and external ear normal. No hemotympanum.      Nose: Nose normal. No congestion.      Mouth/Throat:      Lips: Pink. No lesions.      Mouth: Mucous membranes are dry. No lacerations or oral lesions.      Dentition: No dental tenderness, gingival swelling, dental caries, dental abscesses or gum lesions.      Tongue: No lesions.      Palate: No mass.      Pharynx: Oropharynx is clear. Uvula midline. No pharyngeal swelling, oropharyngeal exudate or posterior oropharyngeal erythema.      Tonsils: No tonsillar exudate or tonsillar abscesses.    Eyes:      Extraocular Movements: Extraocular movements intact.      Conjunctiva/sclera: Conjunctivae normal.      Pupils: Pupils are equal, round, and reactive to light.   Cardiovascular:      Rate and Rhythm: Normal rate and regular rhythm.      Pulses: Normal pulses.      Heart sounds: Normal heart sounds.   Pulmonary:      Effort: Pulmonary effort is normal. No respiratory distress.      Breath sounds: Normal breath sounds. No stridor. No wheezing, rhonchi or rales.   Abdominal:      General: Bowel sounds are normal.      Palpations: Abdomen is soft.      Tenderness: There is no abdominal tenderness. There is no right CVA tenderness, left CVA tenderness, guarding or rebound.   Musculoskeletal:         General: No tenderness or deformity. Normal range of motion.      Cervical back: Normal range of motion and neck supple. No rigidity or tenderness.   Lymphadenopathy:      Cervical: No cervical adenopathy.   Skin:     General: Skin is warm and dry.      Capillary Refill: Capillary refill takes less than 2 seconds.      Coloration: Skin is not jaundiced or pale.      Findings: No bruising, erythema, lesion or rash.      Comments: Chronic venous stasis changes to lower extremities, with chronic lymphedema and varicosities.   Neurological:      General: No focal deficit present.      Mental Status: She is alert and oriented to person, place, and time. Mental status is at baseline.      GCS: GCS eye subscore is 4. GCS verbal subscore is 5. GCS motor subscore is 6.      Cranial Nerves: Cranial nerves 2-12 are intact. No cranial nerve deficit, dysarthria or facial asymmetry.      Sensory: Sensation is intact. No sensory deficit.      Motor: Motor function is intact. No weakness.      Coordination: Coordination is intact. Coordination normal.      Gait: Gait is intact.   Psychiatric:         Mood and Affect: Mood normal.         Behavior: Behavior normal.         Thought Content: Thought content normal.         ED  Medications  Medications   amiodarone tablet 100 mg (has no administration in time range)   rivaroxaban (XARELTO) tablet 15 mg (has no administration in time range)   losartan (COZAAR) tablet 50 mg (has no administration in time range)   Cholecalciferol (VITAMIN D3) tablet 1,000 Units (has no administration in time range)   carvedilol (COREG) tablet 25 mg (has no administration in time range)   acetaminophen (TYLENOL) tablet 650 mg (has no administration in time range)   midazolam (VERSED) injection 3 mg (3 mg Intravenous Given 4/5/24 0343)   propofol (DIPRIVAN) 200 MG/20ML bolus injection 50 mg (50 mg Intravenous Given 4/5/24 0350)       Diagnostic Studies  Results Reviewed       None                   No orders to display         Procedures  Pre-Procedural Sedation    Performed by: Hardy Davis DO  Authorized by: Hardy Davis DO    Consent:     Consent obtained:  Verbal and written    Consent given by:  Patient    Risks discussed:  Allergic reaction, prolonged hypoxia resulting in organ damage, dysrhythmia, prolonged sedation necessitating reversal, inadequate sedation, respiratory compromise necessitating ventilatory assistance and intubation, nausea and vomiting    Alternatives discussed:  Analgesia without sedation  Universal protocol:     Procedure explained and questions answered to patient or proxy's satisfaction: yes      Patient identity confirmation method:  Verbally with patient and arm band  Indications:     Sedation purpose:  Dislocation reduction    Procedure necessitating sedation performed by:  Physician performing sedation    Intended level of sedation:  Moderate (conscious sedation)  Pre-sedation assessment:     Time since last food or drink:  8pm    ASA classification: class 2 - patient with mild systemic disease      Neck mobility: normal      Mouth opening:  3 or more finger widths    Thyromental distance:  3 finger widths    Mallampati score:  I - soft palate, uvula, fauces, pillars visible     Pre-sedation assessments completed and reviewed: airway patency not reviewed, cardiovascular function not reviewed, hydration status not reviewed, mental status not reviewed, nausea/vomiting not reviewed, pain level not reviewed, respiratory function not reviewed and temperature not reviewed      History of difficult intubation: no      Pre-sedation assessment completed:  4/5/2024 3:40 AM  Procedural Sedation    Date/Time: 4/5/2024 3:47 AM    Performed by: Hardy Davis DO  Authorized by: Hardy Davis DO    Immediate pre-procedure details:     Reassessment: Patient reassessed immediately prior to procedure      Reviewed: vital signs, relevant labs/tests and NPO status      Verified: bag valve mask available, emergency equipment available, IV patency confirmed and suction available      Verified comment:  Pt DNR/DNI  Procedure details (see MAR for exact dosages):     Sedation start time:  4/5/2024 4:18 AM    Preoxygenation:  Nasal cannula    Sedation:  Propofol (versed and propofol)    Intra-procedure monitoring:  Blood pressure monitoring, cardiac monitor, continuous pulse oximetry, continuous capnometry, frequent LOC assessments and frequent vital sign checks    Intra-procedure events: none      Sedation end time:  4/5/2024 4:10 AM    Total sedation time (minutes):  30  Post-procedure details:     Post-sedation assessment completed:  4/5/2024 4:20 AM    Attendance: Constant attendance by certified staff until patient recovered      Recovery: Patient returned to pre-procedure baseline      Post-sedation assessments completed and reviewed: post-procedure airway patency not reviewed, post-procedure cardiovascular function not reviewed, post-procedure hydration status not reviewed, post-procedure mental status not reviewed, post-procedure nausea and vomiting status not reviewed, pain score not reviewed, post-procedure respiratory function not reviewed and post-procedure temperature not reviewed      Patient is stable  "for discharge or admission: yes      Patient tolerance:  Tolerated well, no immediate complications  Orthopedic injury treatment    Date/Time: 4/5/2024 4:21 AM    Performed by: Hardy Davis DO  Authorized by: Hardy Davis DO    Patient Location:  ED  [place] Protocol:  Consent: Verbal consent obtained. Written consent obtained.  Risks and benefits: risks, benefits and alternatives were discussed  Consent given by: patient  Time out: Immediately prior to procedure a \"time out\" was called to verify the correct patient, procedure, equipment, support staff and site/side marked as required.  Timeout called at: 4/5/2024 3:47 AM.  Patient understanding: patient states understanding of the procedure being performed  Patient consent: the patient's understanding of the procedure matches consent given  Relevant documents: relevant documents present and verified  Patient identity confirmed: verbally with patient and arm band    Injury location:  Jaw  Location details:  Mandible  Injury type:  Dislocation  Chronicity:  New  Neurovascular status: Neurovascularly intact    Distal perfusion: normal    Neurological function: normal    Range of motion: reduced    Local anesthesia used?: No    General anesthesia used?: No    Sedation type:  Moderate (conscious) sedation (See separate Procedural Sedation form)  Manipulation performed?: Yes    Reduction method: downward and anterior/posterior.  Reduction method: downward and anterior/posterior.  Reduction method: downward and anterior/posterior.  Reduction method: downward and anterior/posterior.  Reduction method: downward and anterior/posterior.  Reduction method: downward and anterior/posterior.  Reduction successful?: Yes    Immobilization:  Ace wrap  Neurovascular status: Neurovascularly intact    Distal perfusion: normal    Neurological function: normal    Range of motion: normal    Patient tolerance:  Patient tolerated the procedure well with no immediate " "complications        ED Course  ED Course as of 04/05/24 0601   Fri Apr 05, 2024   0431 Patient able to tolerate p.o.  Feels no discomfort in her jaw at this time after reduction.  Reviewed care instructions at home such as adherence to soft diet, no opening mouth violin 2 cm for 2 weeks, and to support mouth when yawning.   0555 Patient transport will not be here until 1315.  Mechanical soft diet ordered along with patient's home meds.                                       Medical Decision Making  Patient with history as above presented to triage with CC of \" Patient presents with:  Medical Problem: Pt arrives via EMS from [facility]]. Pt woke up this morning and yawned and can now not close her mouth. This happened once before about 50 years ago.    \"    Hx obtained from pt and EMS    89-year-old female presenting with nontraumatic anterior mandible dislocation after yawning.  Review of prior dislocation 50 years prior.  Patient otherwise maintaining airway, without neurovascular compromise.  Dynamically stable.  On exam she has inability to close her mouth, mild difficulty with speaking and swallowing, malocclusion and prominent appearing lower jaw.  No significant bony tenderness, and without trauma shared decision making was made to hold off on imaging at this time.  Plan to reduce under conscious sedation.  Consent obtained.  Patient    Conscious sedation with dislocation reduction details as above.  Patient tolerated procedure well with resolution of all her symptoms.  No longer in pain and able to open and close her jaw without pain.  Patient given care instructions at home, along with referral to OMFS for further management as outpatient.  Patient agreeable discharge plan.  Patient transport unavailable until 1315, home meds ordered along with soft diet.    Patient was nontoxic appearing and stable. Exam as above. Ambulatory at baseline and Tolerating PO.     Reviewed external " records including notes, and prior labs/imaging results.    DDx including but not limited to: spontaneous jaw dislocation, TMJ disorder; doubt fx.     Consideration was given for admission, but the patient was stable for outpatient management.    Disposition: Discussed need for follow up with their primary doctor or specialist to review all results, including incidental findings as above. Patient discharged with explanation of ED workup and diagnosis, instructions on how to obtain outpatient follow up, care instructions at home, and strict return precautions if patient develops new or worsening symptoms. Patients questions answered and agreeable with discharge plan.     See ED Course for further MDM.      PLEASE NOTE:  This encounter was completed utilizing the M- [employer]Fluency Direct Speech Voice Recognition Software. Grammatical errors, random word insertions, pronoun errors and incomplete sentences are occasional inherent consequences of the system due to software limitations, ambient noise and hardware issues.These may be missed by proof reading prior to affixing electronic signature. Any questions or concerns about the content, text or information contained within the body of this dictation should be directly addressed to the physician for clarification. Please do not hesitate to call me directly if you have any questions or concerns.      Amount and/or Complexity of Data Reviewed  Independent Historian: EMS  External Data Reviewed: labs and notes.    Risk  Prescription drug management.          Disposition  Final diagnoses:   Closed dislocation of jaw, initial encounter     Time reflects when diagnosis was documented in both MDM as applicable and the Disposition within this note       Time User Action Codes Description Comment    4/5/2024  4:25 AM Hardy Davis Add [S03.00XA] Closed dislocation of jaw, initial encounter           ED Disposition       ED Disposition   Discharge    Condition   Stable    Date/Time    Fri Apr 5, 2024  4:56 AM    Comment   Minerva Crockett discharge to home/self care.                   Follow-up Information       Follow up With Specialties Details Why Contact Info    Zaki Anderson MD Family Medicine  As needed [address] [place] [address]  [phone]              Patient's Medications   Discharge Prescriptions    No medications on file         PDMP Review         Value Time User    PDMP Reviewed  Yes 4/5/2024  2:42 AM Mike Villa MD             ED Provider  Attending physically available and evaluated Minerva Crockett. I managed the patient along with the ED Attending.    Electronically Signed by           Hardy Davis DO  04/05/24 0601

## 2024-04-05 NOTE — DISCHARGE INSTRUCTIONS
Today you were seen in the emergency department for jaw dislocation. Your workup included procedures ideation jaw. At this time there does not appear to be an emergent life threatening cause to explain your symptoms. You are stable for discharge home with outpatient follow up.     Use soft diet, do not to open mouth wider than 2cm for 2 weeks, and support mouth when yawning     Please follow up with your primary care provider in the next 2-3 days. Please review all results discussed today with your primary care provider.     Please return to the emergency department as soon as possible if you develop uncontrollable fevers (Temp >100.4), uncontrollable pain, numbness or tingling in your face, repeat jaw dislocation, inability move your jaw, difficulty swallowing/eating/drinking, vomiting, chest pain, trouble breathing, or any other concerning symptoms.     Thank you for choosing [facility] for your care.

## 2024-04-05 NOTE — ED NOTES
Pt left via wheelchair, atttempted to call [facility] with no answer. Unable to leave message.     Christa Ferris RN  04/05/24 0752

## 2024-04-08 DIAGNOSIS — I10 HYPERTENSION, UNSPECIFIED TYPE: Primary | ICD-10-CM

## 2024-04-08 RX ORDER — LOSARTAN POTASSIUM 50 MG/1
TABLET ORAL
Qty: 30 TABLET | Refills: 5 | Status: SHIPPED | OUTPATIENT
Start: 2024-04-08

## 2024-04-18 ENCOUNTER — IN-CLINIC DEVICE VISIT (OUTPATIENT)
Dept: CARDIOLOGY CLINIC | Facility: CLINIC | Age: 89
End: 2024-04-18
Payer: MEDICARE

## 2024-04-18 DIAGNOSIS — Z95.0 PRESENCE OF CARDIAC PACEMAKER: Primary | ICD-10-CM

## 2024-04-18 PROCEDURE — 93280 PM DEVICE PROGR EVAL DUAL: CPT | Performed by: INTERNAL MEDICINE

## 2024-04-18 NOTE — PROGRESS NOTES
Results for orders placed or performed in visit on 04/18/24   Cardiac EP device report    Narrative    MDT DC PPM (MVP OFF) - ACTIVE SYSTEM IS MRI CONDITIONAL  DEVICE INTERROGATED IN THE [place] OFFICE: NP TX PER DR GRAHAM.  BATTERY VOLTAGE ADEQUATE (9.5 YRS). AP 39.9%  100%. (>40% AV BLOCK-DEPENDENT).  ALL LEAD PARAMETERS WITHIN NORMAL LIMITS. NO SIGNIFICANT HIGH RATE EPISODES. (NO ECHO AVAIL) PT TAKING XARELTO, AMIO, CARVEDILOL. NO PROGRAMMING CHANGES MADE TO DEVICE PARAMETERS.  NORMAL DEVICE FUNCTION. PAS/ES

## 2024-04-19 ENCOUNTER — TELEPHONE (OUTPATIENT)
Age: 89
End: 2024-04-19

## 2024-04-19 NOTE — TELEPHONE ENCOUNTER
Caller: Yanna at [facility]    Doctor: Moshe    Reason for call:     Yanna will call back this afternoon, she could not verify pt info, did not have information on her.    Call back#: n/a

## 2024-04-19 NOTE — TELEPHONE ENCOUNTER
Caller: Yanna at [facility]    Doctor: Moshe    Reason for call:     Yanna is calling to leave message for the Dr.  She states the patient is having considerable  Amount of pain (8) of right ankle after walking and sometimes sitting.  Limiting her ability to get around or socialize.  She is concerned and just updating the dr on this information.    Call back#: [phone], Yanna French, physical therapy and director of rehab.

## 2024-04-22 ENCOUNTER — APPOINTMENT (OUTPATIENT)
Dept: RADIOLOGY | Facility: CLINIC | Age: 89
End: 2024-04-22
Payer: MEDICARE

## 2024-04-22 ENCOUNTER — OFFICE VISIT (OUTPATIENT)
Dept: OBGYN CLINIC | Facility: CLINIC | Age: 89
End: 2024-04-22
Payer: MEDICARE

## 2024-04-22 VITALS
SYSTOLIC BLOOD PRESSURE: 152 MMHG | DIASTOLIC BLOOD PRESSURE: 79 MMHG | BODY MASS INDEX: 22.36 KG/M2 | WEIGHT: 131 LBS | HEIGHT: 64 IN | HEART RATE: 78 BPM

## 2024-04-22 DIAGNOSIS — M25.571 PAIN, JOINT, ANKLE AND FOOT, RIGHT: ICD-10-CM

## 2024-04-22 DIAGNOSIS — S93.491D SPRAIN OF ANTERIOR TALOFIBULAR LIGAMENT OF RIGHT ANKLE, SUBSEQUENT ENCOUNTER: ICD-10-CM

## 2024-04-22 DIAGNOSIS — I89.0 LYMPHEDEMA OF BOTH LOWER EXTREMITIES: ICD-10-CM

## 2024-04-22 DIAGNOSIS — M76.71 PERONEAL TENDONITIS OF RIGHT LOWER EXTREMITY: Primary | ICD-10-CM

## 2024-04-22 PROCEDURE — 73610 X-RAY EXAM OF ANKLE: CPT

## 2024-04-22 PROCEDURE — 99213 OFFICE O/P EST LOW 20 MIN: CPT | Performed by: ORTHOPAEDIC SURGERY

## 2024-04-22 NOTE — PROGRESS NOTES
Ortho Sports Medicine Follow Up Patient Ankle Visit    Assesment:  89 y.o. female right ankle sprain and peroneal tendinitis, lymphedema bilateral lower extremities    Plan:    We did review updated right ankle x-rays today given Minerva's new fall, which are negative for acute osseous abnormalities. We reviewed the diagnosis and treatment plan. Minerva demonstrates improved, painless ankle ROM since last visit. I am pleased with her clinical progression and we discussed that ankle sprains can be symptomatic for months. As such, I recommend the patient continue with PT/OT, elevation, ACE wrap as needed, and Tylenol as needed. I also recommended starting lymphedema PT in addition to wearing compression stockings. She will continue to use her walker to assist with ambulation and wear supportive shoes.     Follow-up: as needed      Chief Complaint   Patient presents with    Right Ankle - Follow-up       History of Present Illness:    The patient is a 89 y.o. female, presenting for follow up evaluation of right ankle pain s/p fall on 2/6/24 and new fall on 4/2/24. The patient arrived using a walker via transport from [facility]. The patient reports more constant, throbbing pain in the ankle that is only alleviated by resting with the ankle elevated. Pain is primarily localized to the lateral aspect. The patient denies significant change in symptoms with PT/ACE wrapping and only minimal temporary relief from Tylenol as needed. Since her fall, the patient notes more pain with ambulating or putting any pressure in the foot. She does wear supportive sneakers. The patient denies calf pain/swelling and has unchanged lower leg swelling from baseline. She does have compression stockings available that she wears occasionally.       Ankle Surgical History:  None      Past Medical, Social and Family History:  Past Medical History:   Diagnosis Date    Essential hypertension     LAST ASSESSED: 4/27/15     Malignant neoplasm of breast (HCC)     RIGHT; LAST ASSESSED: 4/27/15    Osteoporosis     LAST ASSESSED: 4/27/15    Pacemaker 04/15/2021     Past Surgical History:   Procedure Laterality Date    BLADDER SURGERY      BREAST LUMPECTOMY      LAST ASSESSED: 4/27/15    HIP SURGERY Right 04/16/2021    TONSILLECTOMY       Allergies   Allergen Reactions    Penicillins Hives    Sulfa Antibiotics Other (See Comments)     Pt doesn't remember     Current Outpatient Medications on File Prior to Visit   Medication Sig Dispense Refill    acetaminophen (TYLENOL) 325 mg tablet TAKE 2 TABLETS (650MG) ORALLY EVERY 8 HOURS AS NEEDED FOR PAIN *NOT TO EXCEED 3GM APAP/24HR* 60 tablet 11    amiodarone 100 mg tablet Take 100 mg by mouth daily      carvedilol (COREG) 25 mg tablet Take 25 mg by mouth 2 (two) times a day with meals      Cholecalciferol (Vitamin D-3) 25 MCG (1000 UT) CAPS Take by mouth daily      clindamycin (CLEOCIN) 300 MG capsule TAKE 600MG (2 CAPSULES) ONE HOUR PRIOR TO DENTAL WORK (Patient not taking: Reported on 3/25/2024)      D3-1000 25 MCG (1000 UT) tablet TAKE 1 TABLET ORALLY DAILY (VITAMIN D DEFICIENCY) *REORDER* 30 tablet 5    losartan (COZAAR) 50 mg tablet TAKE 1 TABLET ORALLY DAILY (HYPERTENSION) *REORDER* 30 tablet 5    nitrofurantoin (MACROBID) 100 mg capsule       Xarelto 15 MG tablet Take 15 mg by mouth daily       No current facility-administered medications on file prior to visit.     Social History     Socioeconomic History    Marital status:      Spouse name: Not on file    Number of children: Not on file    Years of education: Not on file    Highest education level: Not on file   Occupational History    Not on file   Tobacco Use    Smoking status: Never    Smokeless tobacco: Never   Vaping Use    Vaping status: Never Used   Substance and Sexual Activity    Alcohol use: No     Comment: ALCOHOL USE: WINE ON OCCASION, OCCASIONAL ALCOHOL USE AS PER ALLSCRIPTS    Drug use: No    Sexual activity: Not on  "file   Other Topics Concern    Not on file   Social History Narrative    CAFFEINE USE    DENTAL CARE, REGULARLY    DRINKS COFFEE    TEA     Social Determinants of Health     Financial Resource Strain: Low Risk  (12/15/2023)    Overall Financial Resource Strain (CARDIA)     Difficulty of Paying Living Expenses: Not hard at all   Food Insecurity: Not on file   Transportation Needs: No Transportation Needs (12/15/2023)    PRAPARE - Transportation     Lack of Transportation (Medical): No     Lack of Transportation (Non-Medical): No   Physical Activity: Not on file   Stress: Not on file   Social Connections: Not on file   Intimate Partner Violence: Not on file   Housing Stability: Not on file         I have reviewed the past medical, surgical, social and family history, medications and allergies as documented in the EMR.    Review of systems: ROS is negative other than that noted in the HPI.  Constitutional: Negative for fatigue and fever.   HENT: Negative for sore throat.    Respiratory: Negative for shortness of breath.    Cardiovascular: Negative for chest pain.   Gastrointestinal: Negative for abdominal pain.   Endocrine: Negative for cold intolerance and heat intolerance.   Genitourinary: Negative for flank pain.   Musculoskeletal: Negative for back pain.   Skin: Negative for rash.   Allergic/Immunologic: Negative for immunocompromised state.   Neurological: Negative for dizziness.   Psychiatric/Behavioral: Negative for agitation.      Physical Exam:    Blood pressure 152/79, pulse 78, height 5' 4\" (1.626 m), weight 59.4 kg (131 lb).    General/Constitutional: NAD, well developed, well nourished  HENT: Normocephalic, atraumatic  CV: Intact distal pulses, regular rate  Resp: No respiratory distress or labored breathing  Lymphatic: No lymphadenopathy palpated  Neuro: Alert and Oriented x 3, no focal deficits  Psych: Normal mood, normal affect, normal judgement, normal behavior  Skin: Warm, dry, no rashes, no " erythema       Ankle Examination (focused):     No wounds or ecchymosis  Soft tissue swelling over the lateral ankle and throughout the lower extremities bilaterally - baseline per patient  ROM: baseline, but painful DF/PF, inversion/eversion    Mild medial > lateral malleolus tenderness  No ligamentous tenderness  No Achilles tendon tenderness    + Syndesmotic Squeeze    No subluxation of the peroneal tendons. Mild tenderness to palpation along the peroneal tendons    No pain with palpation or range of motion of midfoot and forefoot bilaterally    No calf tenderness to palpation bilaterally    LE NV Exam: +2 DP/PT pulses bilaterally  Sensation intact to light touch L2-S1 bilaterally        Ankle Imaging:    X-rays of the right ankle were reviewed, which demonstrate no acute osseous abnormalities or significant degenerative changes.       Scribe Attestation      I,:  Valery Preston PA-C am acting as a scribe while in the presence of the attending physician.:       I,:  Mike Mesa MD personally performed the services described in this documentation    as scribed in my presence.:

## 2024-05-09 ENCOUNTER — HOSPITAL ENCOUNTER (OUTPATIENT)
Dept: RADIOLOGY | Facility: HOSPITAL | Age: 89
Discharge: HOME | End: 2024-05-09
Attending: EMERGENCY MEDICINE
Payer: MEDICARE

## 2024-05-09 VITALS — HEIGHT: 64 IN | WEIGHT: 131 LBS | BODY MASS INDEX: 22.36 KG/M2

## 2024-05-09 DIAGNOSIS — M81.0 OSTEOPOROSIS, UNSPECIFIED OSTEOPOROSIS TYPE, UNSPECIFIED PATHOLOGICAL FRACTURE PRESENCE: ICD-10-CM

## 2024-05-09 PROCEDURE — 77080 DXA BONE DENSITY AXIAL: CPT

## 2024-06-11 ENCOUNTER — HOSPITAL ENCOUNTER (OUTPATIENT)
Dept: MRI IMAGING | Facility: HOSPITAL | Age: 89
Discharge: HOME | End: 2024-06-11
Payer: MEDICARE

## 2024-06-11 DIAGNOSIS — M25.571 RIGHT ANKLE PAIN, UNSPECIFIED CHRONICITY: ICD-10-CM

## 2024-06-11 PROCEDURE — 73721 MRI JNT OF LWR EXTRE W/O DYE: CPT

## 2024-07-22 ENCOUNTER — REMOTE DEVICE CLINIC VISIT (OUTPATIENT)
Dept: CARDIOLOGY CLINIC | Facility: CLINIC | Age: 89
End: 2024-07-22
Payer: MEDICARE

## 2024-07-22 DIAGNOSIS — I44.2 COMPLETE HEART BLOCK (HCC): Primary | ICD-10-CM

## 2024-07-22 PROCEDURE — 93296 REM INTERROG EVL PM/IDS: CPT | Performed by: INTERNAL MEDICINE

## 2024-07-22 PROCEDURE — 93294 REM INTERROG EVL PM/LDLS PM: CPT | Performed by: INTERNAL MEDICINE

## 2024-07-22 NOTE — PROGRESS NOTES
Results for orders placed or performed in visit on 07/22/24   Cardiac EP device report    Narrative    MDT DC PPM (MVP OFF) - ACTIVE SYSTEM IS MRI CONDITIONAL  CARELINK TRANSMISSION: BATTERY VOLTAGE ADEQUATE (9.3 YRS). AP: 54.5%. : 100% (>40%~MVP-OFF~CHB). ALL AVAILABLE LEAD PARAMETERS WITHIN NORMAL LIMITS. NO SIGNIFICANT HIGH RATE EPISODES. NORMAL DEVICE FUNCTION. CH

## 2024-10-21 ENCOUNTER — REMOTE DEVICE CLINIC VISIT (OUTPATIENT)
Dept: CARDIOLOGY CLINIC | Facility: CLINIC | Age: 89
End: 2024-10-21
Payer: MEDICARE

## 2024-10-21 DIAGNOSIS — I44.2 CHB (COMPLETE HEART BLOCK) (HCC): Primary | ICD-10-CM

## 2024-10-21 PROCEDURE — 93296 REM INTERROG EVL PM/IDS: CPT | Performed by: INTERNAL MEDICINE

## 2024-10-21 PROCEDURE — 93294 REM INTERROG EVL PM/LDLS PM: CPT | Performed by: INTERNAL MEDICINE

## 2024-10-21 NOTE — PROGRESS NOTES
Results for orders placed or performed in visit on 10/21/24   Cardiac EP device report    Narrative    MDT DC PPM (MVP OFF) - ACTIVE SYSTEM IS MRI CONDITIONAL  CARELINK TRANSMISSION: BATTERY VOLTAGE ADEQUATE (9.1 YRS). AP: 36.5%. : 100% (>40%~OFF). ALL AVAILABLE LEAD PARAMETERS WITHIN NORMAL LIMITS. NO SIGNIFICANT HIGH RATE EPISODES. NORMAL DEVICE FUNCTION. CH

## 2025-01-08 NOTE — PROGRESS NOTES
Cardiology   Follow Up   Office Visit Note  Minerva Crockett   89 y.o.   female   MRN: [id number]  [facility] [place]  [address]  [address]  [place] [address] [phone]    PCP: Zaki Anderson MD  Cardiologist: Dr. Jolly      @[email or web address]        Summary of Plan:  Heart healthy diet: Mediterranean or DASH.  Education provided  Repeat CBC, BMP and TSH 6 months, plus chest x-ray given amiodarone surveillance, chronic anticoagulation  Follow-up with Dr. Jolly: 6 months          Assessment/Plan  HFrEF, EF 35 to 40%, with a drop in EF recently.  In 2021 it was 50%  In the past, she was diagnosed with Takotsubo cardiomyopathy per prior records  last echo was in June 2021 in [place].  Her EF was 50% and apparently improved.   She had been asymptomatic hence an echo was not repeated  Echo 12/4/2024 : EF 35 to 40%.  Probable RV dilatation  Maintained on carvedilol 25 mg twice daily and losartan 50 mg daily  She has having winter clothing on and her right ankle brace.  On exam she is euvolemic  Wt Readings from Last 3 Encounters:   01/09/25 63.1 kg (139 lb 3.2 oz)   05/09/24 59.4 kg (131 lb)   04/22/24 59.4 kg (131 lb)   Pulmonary hypertension  Severe TR  Moderate MR  History of CHB  Status post MDT PPM  Interrogation 10/21/2024: A-paced 36.5%.  V paced 100%.  All lead parameters within normal limits.  No significant high rate.  Normal device function.  Hypertension  /72  On carvedilol 25 mg twice daily, losartan 50 mg daily  Paroxysmal atrial fibrillation/flutter:   On OAC with Xarelto 15 mg daily given age and renal function  Rate control with carvedilol 25 mg twice daily  AAD with amiodarone 100 mg daily  TSH and LFTs last year were normal.  Chest x-ray: Will be ordered  CKD stage III AA.  Baseline creatinine around 1.2.  LBBB, chronic  Chronic lymphedema  cardiac testing  TTE 6/4/2021 outside hospital EF 50%.  Mild apical hypokinesis.  TTE 12/4/2024 mobile echo.  EF  35 to 40%.  Probable RV dilatation.  Moderate SANDI.  Moderate MR.  Severe TR.  Estimated PASP 53 mmHg.  Moderate pulmonary hypertension.                      HPI  Minerva Crockett is a 89 y.o.year old female with hypertension, dyslipidemia, stress-induced cardiomyopathy with improved EF, CHB status post Medtronic PPM, paroxysmal atrial fibrillation/flutter maintained on amiodarone and chronic anticoagulation.  In the past she followed in [place] at [facility]  She now lives at [facility] and follows with Dr. Jolly.  She was last seen 3/25/2024      3/25/2024 OV Dr. Jolly  Per his note:    History of the Presenting Illness, Discussion/Summary and My Plan are as follows:::     Minerva is a pleasant 89-year-old lady with hypertension, no diabetes, no dyslipidemia, history of stress induced cardiomyopathy-about 5 years ago, in [place], status post permanent pacemaker implantation for complete heart block-Medtronic device-around 2020 although more recent notes have stated ICD, paroxysmal atrial fibrillation/flutter maintained on a rhythm control strategy on amiodarone 100 mg daily and on anticoagulation with Xarelto.     She is physically modestly active, gets around with a walker, lives at [facility] without any limitations from a cardiac standpoint.  Had a mechanical fall in February resulting in a right ankle sprain that is slowly improving.     She does not recall any recent heart failure hospitalizations.  Denies a prior history of coronary artery disease stenting or bypass surgery.     Cardiac exam is unremarkable  ECG shows AV dual paced rhythm  Cardiology office notes from [place]/[facility] reviewed     Plan:     History of stress cardiomyopathy: Based on office notes from [place], last echo was in June 2021 and EF is 50% and had improved from prior, I do not have these images or the report itself.  Considering that she is asymptomatic with no evidence of CHF, will not repeat an echo at this  time.  Maintained on beta-blocker and ARB, euvolemic at this time     History of complete heart block: Status post permanent pacemaker implantation-Medtronic device-however on last few notes, reported as' ICD interrogation', she does not recall having had a repeat procedure.  I will simply set her up for interrogations through our office     Hypertension: Controlled, likely has CKD - see numbers below     Paroxysmal atrial fibrillation/flutter: On Xarelto for anticoagulation, on carvedilol 25 mg twice daily, no changes at this time, on rhythm control with amiodarone.  No bleeding issues, no more falls other than described above, check CBC along with other blood work-see below     Long-term amiodarone use: Has been on it for about 4 years per her report, will check TSH, CMP, chest x-ray (instead of spirometry since she is 89), she is on low-dose Amio at 100 mg daily     Follow-up in 6 months    1/9/2025  Cardiology follow-up  ROS: sustained an injury to her right ankle 10 mos ago.  Has a torn tendon of the ankle and has been wearing a brace ever since.  Chronically large lower extremities-. Not worse. No CP, SOB, or palpitations.  EKG: Not performed  Labs labs 12/2/24:    H/H 11.2/ 33.8 Pl 241K    Cr 1.0 BUN 24 K 4.8   TSH 2.1   /72    Current medications: Amiodarone 100 mg daily, carvedilol 25 mg twice daily, losartan 50 mg daily, Xarelto 15 mg daily  On exam she appears euvolemic  Will continue the current plan  I ordered a repeat TSH CBC, and BMP along with a chest x-ray to be done in 6 months prior to the next visit            Review of Systems   Constitutional: Negative for chills.   Cardiovascular:  Positive for leg swelling. Negative for chest pain, claudication, cyanosis, dyspnea on exertion, irregular heartbeat, near-syncope, orthopnea, palpitations, paroxysmal nocturnal dyspnea and syncope.        Chronic leg sweling, soren   Respiratory:  Negative for cough and shortness of breath.    Gastrointestinal:   Negative for heartburn and nausea.   Neurological:  Negative for dizziness, focal weakness, headaches, light-headedness and weakness.   All other systems reviewed and are negative.            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)    Pacemaker    Pulmonary hypertension (HCC)    Stage 3a chronic kidney disease (HCC)    Encounter for monitoring amiodarone therapy  -     TSH, 3rd generation with Free T4 reflex; Future  -     Hepatic function panel; Future  -     XR chest pa and lateral; Future    Chronic anticoagulation  -     CBC and Platelet; Future    Other orders  -     Cyanocobalamin (Vitamin B 12) 500 MCG TABS; Take by mouth        Past Medical History:   Diagnosis Date    Essential hypertension     LAST ASSESSED: 4/27/15    Malignant neoplasm of breast (HCC)     RIGHT; LAST ASSESSED: 4/27/15    Osteoporosis     LAST ASSESSED: 4/27/15    Pacemaker 04/15/2021       Past Surgical History:   Procedure Laterality Date    BLADDER SURGERY      BREAST LUMPECTOMY      LAST ASSESSED: 4/27/15    HIP SURGERY Right 04/16/2021    TONSILLECTOMY             Allergies  Allergies   Allergen Reactions    Penicillins Hives    Sulfa Antibiotics Other (See Comments)     Pt doesn't remember         Medications    Current Outpatient Medications:     acetaminophen (TYLENOL) 325 mg tablet, TAKE 2 TABLETS (650MG) ORALLY EVERY 8 HOURS AS NEEDED FOR PAIN *NOT TO EXCEED 3GM APAP/24HR*, Disp: 60 tablet, Rfl: 11    amiodarone 100 mg tablet, Take 100 mg by mouth daily, Disp: , Rfl:     carvedilol (COREG) 25 mg tablet, Take 25 mg by mouth 2 (two) times a day with meals, Disp: , Rfl:     Cholecalciferol (Vitamin D-3) 25 MCG (1000 UT) CAPS, Take by mouth daily, Disp: , Rfl:     clindamycin (CLEOCIN) 300 MG capsule, , Disp: , Rfl:     Cyanocobalamin (Vitamin B 12) 500 MCG TABS, Take by mouth, Disp: , Rfl:     losartan (COZAAR) 50 mg tablet, TAKE 1 TABLET  ORALLY DAILY (HYPERTENSION) *REORDER*, Disp: 30 tablet, Rfl: 5    Xarelto 15 MG tablet, Take 15 mg by mouth daily, Disp: , Rfl:     D3-1000 25 MCG (1000 UT) tablet, TAKE 1 TABLET ORALLY DAILY (VITAMIN D DEFICIENCY) *REORDER* (Patient not taking: Reported on 1/9/2025), Disp: 30 tablet, Rfl: 5      Social History     Socioeconomic History    Marital status:      Spouse name: Not on file    Number of children: Not on file    Years of education: Not on file    Highest education level: Not on file   Occupational History    Not on file   Tobacco Use    Smoking status: Never    Smokeless tobacco: Never   Vaping Use    Vaping status: Never Used   Substance and Sexual Activity    Alcohol use: No     Comment: ALCOHOL USE: WINE ON OCCASION, OCCASIONAL ALCOHOL USE AS PER ALLSCRIPTS    Drug use: No    Sexual activity: Not on file   Other Topics Concern    Not on file   Social History Narrative    CAFFEINE USE    DENTAL CARE, REGULARLY    DRINKS COFFEE    TEA     Social Drivers of Health     Financial Resource Strain: Low Risk  (12/15/2023)    Overall Financial Resource Strain (CARDIA)     Difficulty of Paying Living Expenses: Not hard at all   Food Insecurity: Not on file   Transportation Needs: No Transportation Needs (12/15/2023)    PRAPARE - Transportation     Lack of Transportation (Medical): No     Lack of Transportation (Non-Medical): No   Physical Activity: Not on file   Stress: Not on file   Social Connections: Not on file   Intimate Partner Violence: Not on file   Housing Stability: Not on file       Family History   Problem Relation Age of Onset    Stroke Mother         CEREBROVASCULAR ACCIDENT    Hypertension Mother     Ovarian cancer Mother     Pancreatic cancer Mother         PANCREAS CARCINOMA    Pancreatic cancer Brother         PANCREAS CARCINOMA    Bipolar disorder Other     Substance Abuse Neg Hx     Mental illness Neg Hx        Physical Exam  Vitals and nursing note reviewed.   Constitutional:        "General: She is not in acute distress.     Appearance: She is not diaphoretic.   HENT:      Head: Normocephalic and atraumatic.   Eyes:      Conjunctiva/sclera: Conjunctivae normal.   Cardiovascular:      Rate and Rhythm: Normal rate and regular rhythm.      Pulses: Intact distal pulses.      Heart sounds: Normal heart sounds.   Pulmonary:      Effort: Pulmonary effort is normal.      Breath sounds: Normal breath sounds.   Abdominal:      General: Bowel sounds are normal.      Palpations: Abdomen is soft.   Musculoskeletal:         General: Normal range of motion.      Cervical back: Normal range of motion and neck supple.      Right lower leg: Edema present.      Left lower leg: Edema present.      Comments: LE soft.  Right ankle brace in place   Skin:     General: Skin is warm and dry.   Neurological:      Mental Status: She is alert and oriented to person, place, and time.         Vitals: Blood pressure 122/72, pulse 62, weight 63.1 kg (139 lb 3.2 oz), SpO2 95%.   Wt Readings from Last 3 Encounters:   01/09/25 63.1 kg (139 lb 3.2 oz)   05/09/24 59.4 kg (131 lb)   04/22/24 59.4 kg (131 lb)           Labs & Results:  Lab Results   Component Value Date    WBC 5.5 05/08/2023    HGB 13.5 05/08/2023    HCT 39.8 05/08/2023    MCV 93 05/08/2023     05/08/2023     No results found for: \"BNP\"  No components found for: \"CHEM\"  No results found for: \"CKTOTAL\", \"TROPONINI\", \"TROPONINT\", \"CKMBINDEX\"  No results found for this or any previous visit.    No results found for this or any previous visit.    No valid procedures specified.  No results found for this or any previous visit.                This note was completed in part utilizing Life[employer]ribe direct voice recognition software.   Grammatical errors, random word insertion, spelling mistakes, and incomplete sentences may be an occasional consequence of the system secondary to software limitations, ambient noise and hardware issues. At the time of dictation, " efforts were made to edit, clarify and /or correct errors.  Please read the chart carefully and recognize, using context, where substitutions have occurred.  If you have any questions or concerns about the context, text or information contained within the body of this dictation, please contact myself, the provider, for further clarification

## 2025-01-09 ENCOUNTER — OFFICE VISIT (OUTPATIENT)
Dept: CARDIOLOGY CLINIC | Facility: CLINIC | Age: OVER 89
End: 2025-01-09
Payer: MEDICARE

## 2025-01-09 VITALS
OXYGEN SATURATION: 95 % | SYSTOLIC BLOOD PRESSURE: 122 MMHG | DIASTOLIC BLOOD PRESSURE: 72 MMHG | HEART RATE: 62 BPM | WEIGHT: 139.2 LBS | BODY MASS INDEX: 23.89 KG/M2

## 2025-01-09 DIAGNOSIS — I48.92 PAROXYSMAL ATRIAL FLUTTER (HCC): Primary | ICD-10-CM

## 2025-01-09 DIAGNOSIS — I27.20 PULMONARY HYPERTENSION (HCC): ICD-10-CM

## 2025-01-09 DIAGNOSIS — N18.31 STAGE 3A CHRONIC KIDNEY DISEASE (HCC): ICD-10-CM

## 2025-01-09 DIAGNOSIS — Z95.0 PACEMAKER: ICD-10-CM

## 2025-01-09 DIAGNOSIS — Z79.01 CHRONIC ANTICOAGULATION: ICD-10-CM

## 2025-01-09 DIAGNOSIS — I44.2 AV BLOCK, 3RD DEGREE (HCC): ICD-10-CM

## 2025-01-09 DIAGNOSIS — Z79.899 ENCOUNTER FOR MONITORING AMIODARONE THERAPY: ICD-10-CM

## 2025-01-09 DIAGNOSIS — Z51.81 ENCOUNTER FOR MONITORING AMIODARONE THERAPY: ICD-10-CM

## 2025-01-09 PROCEDURE — 99214 OFFICE O/P EST MOD 30 MIN: CPT | Performed by: NURSE PRACTITIONER

## 2025-01-09 RX ORDER — CYANOCOBALAMIN (VITAMIN B-12) 500 MCG
TABLET ORAL
COMMUNITY

## 2025-01-09 NOTE — LETTER
January 9, 2025     Zaki Anderson MD  [address] [place] [id number]    Patient: Minerva Crockett   YOB: 1930   Date of Visit: 1/9/2025       Dear Dr. Anderson:    Thank you for referring Minerva Crockett to me for evaluation. Below are my notes for this consultation.    If you have questions, please do not hesitate to call me. I look forward to following your patient along with you.         Sincerely,        VIJAY Jerez        CC: MD Cassie Pena CRNP  1/9/2025 11:08 AM  Sign when Signing Visit  Cardiology   Follow Up   Office Visit Note  Minerva Crockett   89 y.o.   female   MRN: [id number]  [facility] [place]  [address] [place] [address]  [facility] [address]  [phone]    PCP: Zaki Anderson MD  Cardiologist: Dr. Jolly      @[email or web address]        Summary of Plan:  Heart healthy diet: Mediterranean or DASH.  Education provided  Repeat CBC, BMP and TSH 6 months, plus chest x-ray given amiodarone surveillance, chronic anticoagulation  Follow-up with Dr. Jolly: 6 months          Assessment/Plan  HFrEF, EF 35 to 40%, with a drop in EF recently.  In 2021 it was 50%  In the past, she was diagnosed with Takotsubo cardiomyopathy per prior records  last echo was in June 2021 in [place].  Her EF was 50% and apparently improved.   She had been asymptomatic hence an echo was not repeated  Echo 12/4/2024 : EF 35 to 40%.  Probable RV dilatation  Maintained on carvedilol 25 mg twice daily and losartan 50 mg daily  She has having winter clothing on and her right ankle brace.  On exam she is euvolemic  Wt Readings from Last 3 Encounters:   01/09/25 63.1 kg (139 lb 3.2 oz)   05/09/24 59.4 kg (131 lb)   04/22/24 59.4 kg (131 lb)   Pulmonary hypertension  Severe TR  Moderate MR  History of CHB  Status post MDT PPM  Interrogation 10/21/2024: A-paced 36.5%.  V paced 100%.  All lead parameters within normal limits.  No significant high rate.  Normal device  function.  Hypertension  /72  On carvedilol 25 mg twice daily, losartan 50 mg daily  Paroxysmal atrial fibrillation/flutter:   On OAC with Xarelto 15 mg daily given age and renal function  Rate control with carvedilol 25 mg twice daily  AAD with amiodarone 100 mg daily  TSH and LFTs last year were normal.  Chest x-ray: Will be ordered  CKD stage III AA.  Baseline creatinine around 1.2.  LBBB, chronic  Chronic lymphedema  cardiac testing  TTE 6/4/2021 outside hospital EF 50%.  Mild apical hypokinesis.  TTE 12/4/2024 mobile echo.  EF 35 to 40%.  Probable RV dilatation.  Moderate SANDI.  Moderate MR.  Severe TR.  Estimated PASP 53 mmHg.  Moderate pulmonary hypertension.                      HPI  Minerva Crockett is a 89 y.o.year old female with hypertension, dyslipidemia, stress-induced cardiomyopathy with improved EF, CHB status post Medtronic PPM, paroxysmal atrial fibrillation/flutter maintained on amiodarone and chronic anticoagulation.  In the past she followed in [place] at [facility]  She now lives at [facility] and follows with Dr. Jolly.  She was last seen 3/25/2024      3/25/2024 OV Dr. Jolly  Per his note:    History of the Presenting Illness, Discussion/Summary and My Plan are as follows:::     Minerva is a pleasant 89-year-old lady with hypertension, no diabetes, no dyslipidemia, history of stress induced cardiomyopathy-about 5 years ago, in [place], status post permanent pacemaker implantation for complete heart block-Medtronic device-around 2020 although more recent notes have stated ICD, paroxysmal atrial fibrillation/flutter maintained on a rhythm control strategy on amiodarone 100 mg daily and on anticoagulation with Xarelto.     She is physically modestly active, gets around with a walker, lives at [facility] without any limitations from a cardiac standpoint.  Had a mechanical fall in February resulting in a right ankle sprain that is slowly improving.     She does not recall  any recent heart failure hospitalizations.  Denies a prior history of coronary artery disease stenting or bypass surgery.     Cardiac exam is unremarkable  ECG shows AV dual paced rhythm  Cardiology office notes from [place] reviewed     Plan:     History of stress cardiomyopathy: Based on office notes from [place], last echo was in June 2021 and EF is 50% and had improved from prior, I do not have these images or the report itself.  Considering that she is asymptomatic with no evidence of CHF, will not repeat an echo at this time.  Maintained on beta-blocker and ARB, euvolemic at this time     History of complete heart block: Status post permanent pacemaker implantation-Medtronic device-however on last few notes, reported as' ICD interrogation', she does not recall having had a repeat procedure.  I will simply set her up for interrogations through our office     Hypertension: Controlled, likely has CKD - see numbers below     Paroxysmal atrial fibrillation/flutter: On Xarelto for anticoagulation, on carvedilol 25 mg twice daily, no changes at this time, on rhythm control with amiodarone.  No bleeding issues, no more falls other than described above, check CBC along with other blood work-see below     Long-term amiodarone use: Has been on it for about 4 years per her report, will check TSH, CMP, chest x-ray (instead of spirometry since she is 89), she is on low-dose Amio at 100 mg daily     Follow-up in 6 months    1/9/2025  Cardiology follow-up  ROS: sustained an injury to her right ankle 10 mos ago.  Has a torn tendon of the ankle and has been wearing a brace ever since.  Chronically large lower extremities-. Not worse. No CP, SOB, or palpitations.  EKG: Not performed  Labs labs 12/2/24:    H/H 11.2/ 33.8 Pl 241K    Cr 1.0 BUN 24 K 4.8   TSH 2.1   /72    Current medications: Amiodarone 100 mg daily, carvedilol 25 mg twice daily, losartan 50 mg daily, Xarelto 15 mg daily  On exam she appears  euvolemic  Will continue the current plan  I ordered a repeat TSH CBC, and BMP along with a chest x-ray to be done in 6 months prior to the next visit            Review of Systems   Constitutional: Negative for chills.   Cardiovascular:  Positive for leg swelling. Negative for chest pain, claudication, cyanosis, dyspnea on exertion, irregular heartbeat, near-syncope, orthopnea, palpitations, paroxysmal nocturnal dyspnea and syncope.        Chronic leg sweling, soren   Respiratory:  Negative for cough and shortness of breath.    Gastrointestinal:  Negative for heartburn and nausea.   Neurological:  Negative for dizziness, focal weakness, headaches, light-headedness and weakness.   All other systems reviewed and are negative.            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)    Pacemaker    Pulmonary hypertension (HCC)    Stage 3a chronic kidney disease (HCC)    Encounter for monitoring amiodarone therapy  -     TSH, 3rd generation with Free T4 reflex; Future  -     Hepatic function panel; Future  -     XR chest pa and lateral; Future    Chronic anticoagulation  -     CBC and Platelet; Future    Other orders  -     Cyanocobalamin (Vitamin B 12) 500 MCG TABS; Take by mouth        Past Medical History:   Diagnosis Date   • Essential hypertension     LAST ASSESSED: 4/27/15   • Malignant neoplasm of breast (HCC)     RIGHT; LAST ASSESSED: 4/27/15   • Osteoporosis     LAST ASSESSED: 4/27/15   • Pacemaker 04/15/2021       Past Surgical History:   Procedure Laterality Date   • BLADDER SURGERY     • BREAST LUMPECTOMY      LAST ASSESSED: 4/27/15   • HIP SURGERY Right 04/16/2021   • TONSILLECTOMY             Allergies  Allergies   Allergen Reactions   • Penicillins Hives   • Sulfa Antibiotics Other (See Comments)     Pt doesn't remember         Medications    Current Outpatient Medications:   •  acetaminophen (TYLENOL) 325 mg tablet,  TAKE 2 TABLETS (650MG) ORALLY EVERY 8 HOURS AS NEEDED FOR PAIN *NOT TO EXCEED 3GM APAP/24HR*, Disp: 60 tablet, Rfl: 11  •  amiodarone 100 mg tablet, Take 100 mg by mouth daily, Disp: , Rfl:   •  carvedilol (COREG) 25 mg tablet, Take 25 mg by mouth 2 (two) times a day with meals, Disp: , Rfl:   •  Cholecalciferol (Vitamin D-3) 25 MCG (1000 UT) CAPS, Take by mouth daily, Disp: , Rfl:   •  clindamycin (CLEOCIN) 300 MG capsule, , Disp: , Rfl:   •  Cyanocobalamin (Vitamin B 12) 500 MCG TABS, Take by mouth, Disp: , Rfl:   •  losartan (COZAAR) 50 mg tablet, TAKE 1 TABLET ORALLY DAILY (HYPERTENSION) *REORDER*, Disp: 30 tablet, Rfl: 5  •  Xarelto 15 MG tablet, Take 15 mg by mouth daily, Disp: , Rfl:   •  D3-1000 25 MCG (1000 UT) tablet, TAKE 1 TABLET ORALLY DAILY (VITAMIN D DEFICIENCY) *REORDER* (Patient not taking: Reported on 1/9/2025), Disp: 30 tablet, Rfl: 5      Social History     Socioeconomic History   • Marital status:      Spouse name: Not on file   • Number of children: Not on file   • Years of education: Not on file   • Highest education level: Not on file   Occupational History   • Not on file   Tobacco Use   • Smoking status: Never   • Smokeless tobacco: Never   Vaping Use   • Vaping status: Never Used   Substance and Sexual Activity   • Alcohol use: No     Comment: ALCOHOL USE: WINE ON OCCASION, OCCASIONAL ALCOHOL USE AS PER ALLSCRIPTS   • Drug use: No   • Sexual activity: Not on file   Other Topics Concern   • Not on file   Social History Narrative    CAFFEINE USE    DENTAL CARE, REGULARLY    DRINKS COFFEE    TEA     Social Drivers of Health     Financial Resource Strain: Low Risk  (12/15/2023)    Overall Financial Resource Strain (CARDIA)    • Difficulty of Paying Living Expenses: Not hard at all   Food Insecurity: Not on file   Transportation Needs: No Transportation Needs (12/15/2023)    PRAPARE - Transportation    • Lack of Transportation (Medical): No    • Lack of Transportation (Non-Medical): No  "  Physical Activity: Not on file   Stress: Not on file   Social Connections: Not on file   Intimate Partner Violence: Not on file   Housing Stability: Not on file       Family History   Problem Relation Age of Onset   • Stroke Mother         CEREBROVASCULAR ACCIDENT   • Hypertension Mother    • Ovarian cancer Mother    • Pancreatic cancer Mother         PANCREAS CARCINOMA   • Pancreatic cancer Brother         PANCREAS CARCINOMA   • Bipolar disorder Other    • Substance Abuse Neg Hx    • Mental illness Neg Hx        Physical Exam  Vitals and nursing note reviewed.   Constitutional:       General: She is not in acute distress.     Appearance: She is not diaphoretic.   HENT:      Head: Normocephalic and atraumatic.   Eyes:      Conjunctiva/sclera: Conjunctivae normal.   Cardiovascular:      Rate and Rhythm: Normal rate and regular rhythm.      Pulses: Intact distal pulses.      Heart sounds: Normal heart sounds.   Pulmonary:      Effort: Pulmonary effort is normal.      Breath sounds: Normal breath sounds.   Abdominal:      General: Bowel sounds are normal.      Palpations: Abdomen is soft.   Musculoskeletal:         General: Normal range of motion.      Cervical back: Normal range of motion and neck supple.      Right lower leg: Edema present.      Left lower leg: Edema present.      Comments: LE soft.  Right ankle brace in place   Skin:     General: Skin is warm and dry.   Neurological:      Mental Status: She is alert and oriented to person, place, and time.         Vitals: Blood pressure 122/72, pulse 62, weight 63.1 kg (139 lb 3.2 oz), SpO2 95%.   Wt Readings from Last 3 Encounters:   01/09/25 63.1 kg (139 lb 3.2 oz)   05/09/24 59.4 kg (131 lb)   04/22/24 59.4 kg (131 lb)           Labs & Results:  Lab Results   Component Value Date    WBC 5.5 05/08/2023    HGB 13.5 05/08/2023    HCT 39.8 05/08/2023    MCV 93 05/08/2023     05/08/2023     No results found for: \"BNP\"  No components found for: \"CHEM\"  No " "results found for: \"CKTOTAL\", \"TROPONINI\", \"TROPONINT\", \"CKMBINDEX\"  No results found for this or any previous visit.    No results found for this or any previous visit.    No valid procedures specified.  No results found for this or any previous visit.                This note was completed in part utilizing BATS direct voice recognition software.   Grammatical errors, random word insertion, spelling mistakes, and incomplete sentences may be an occasional consequence of the system secondary to software limitations, ambient noise and hardware issues. At the time of dictation, efforts were made to edit, clarify and /or correct errors.  Please read the chart carefully and recognize, using context, where substitutions have occurred.  If you have any questions or concerns about the context, text or information contained within the body of this dictation, please contact myself, the provider, for further clarification      "

## 2025-01-20 ENCOUNTER — REMOTE DEVICE CLINIC VISIT (OUTPATIENT)
Dept: CARDIOLOGY CLINIC | Facility: CLINIC | Age: OVER 89
End: 2025-01-20
Payer: MEDICARE

## 2025-01-20 ENCOUNTER — RESULTS FOLLOW-UP (OUTPATIENT)
Dept: NON INVASIVE DIAGNOSTICS | Facility: HOSPITAL | Age: OVER 89
End: 2025-01-20

## 2025-01-20 DIAGNOSIS — Z95.0 PRESENCE OF PERMANENT CARDIAC PACEMAKER: Primary | ICD-10-CM

## 2025-01-20 PROCEDURE — 93296 REM INTERROG EVL PM/IDS: CPT | Performed by: STUDENT IN AN ORGANIZED HEALTH CARE EDUCATION/TRAINING PROGRAM

## 2025-01-20 PROCEDURE — 93294 REM INTERROG EVL PM/LDLS PM: CPT | Performed by: STUDENT IN AN ORGANIZED HEALTH CARE EDUCATION/TRAINING PROGRAM

## 2025-01-20 NOTE — PROGRESS NOTES
MDT DC PM/ACTIVE SYSTEM IS MRI CONDITIONAL   CARELINK TRANSMISSION:  BATTERY VOLTAGE ADEQUATE (8.7 YR.).  AP 52.2%  100% (CHB/DDD 60 PPM, -180 MS).  ALL LEAD PARAMETERS WITHIN NORMAL LIMITS.  NO SIGNIFICANT HIGH RATE EPISODES.  NORMAL DEVICE FUNCTION.  RG

## 2025-04-28 ENCOUNTER — DOCUMENTATION (OUTPATIENT)
Dept: ADMINISTRATIVE | Facility: OTHER | Age: OVER 89
End: 2025-04-28

## 2025-04-28 NOTE — PROGRESS NOTES
04/28/25 8:39 AM    Annual Wellness Visit outreach is not required, patient is living in a nursing home/ long term care facility/ other facility.     Thank you.  EVENS MARTINEZ MA  [facility]

## 2025-04-30 ENCOUNTER — REMOTE DEVICE CLINIC VISIT (OUTPATIENT)
Dept: CARDIOLOGY CLINIC | Facility: CLINIC | Age: OVER 89
End: 2025-04-30
Payer: MEDICARE

## 2025-04-30 DIAGNOSIS — Z95.0 PRESENCE OF PERMANENT CARDIAC PACEMAKER: Primary | ICD-10-CM

## 2025-04-30 PROCEDURE — 93296 REM INTERROG EVL PM/IDS: CPT | Performed by: INTERNAL MEDICINE

## 2025-04-30 PROCEDURE — 93294 REM INTERROG EVL PM/LDLS PM: CPT | Performed by: INTERNAL MEDICINE

## 2025-05-02 ENCOUNTER — RESULTS FOLLOW-UP (OUTPATIENT)
Dept: CARDIOLOGY CLINIC | Facility: CLINIC | Age: OVER 89
End: 2025-05-02

## 2025-06-29 ENCOUNTER — APPOINTMENT (EMERGENCY)
Dept: CT IMAGING | Facility: HOSPITAL | Age: 89
DRG: 086 | End: 2025-06-29
Payer: MEDICARE

## 2025-06-29 ENCOUNTER — HOSPITAL ENCOUNTER (INPATIENT)
Facility: HOSPITAL | Age: 89
LOS: 5 days | Discharge: NURSING, REHAB OR LONG-TERM CARE | DRG: 086 | End: 2025-07-04
Attending: SURGERY | Admitting: SURGERY
Payer: MEDICARE

## 2025-06-29 ENCOUNTER — APPOINTMENT (INPATIENT)
Dept: RADIOLOGY | Facility: HOSPITAL | Age: 89
DRG: 086 | End: 2025-06-29
Payer: MEDICARE

## 2025-06-29 ENCOUNTER — APPOINTMENT (INPATIENT)
Dept: CT IMAGING | Facility: HOSPITAL | Age: 89
DRG: 086 | End: 2025-06-29
Payer: MEDICARE

## 2025-06-29 ENCOUNTER — APPOINTMENT (EMERGENCY)
Dept: RADIOLOGY | Facility: HOSPITAL | Age: 89
DRG: 086 | End: 2025-06-29
Payer: MEDICARE
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
## 2025-06-29 DIAGNOSIS — N39.0 URINARY TRACT INFECTION WITHOUT HEMATURIA, SITE UNSPECIFIED: ICD-10-CM

## 2025-06-29 DIAGNOSIS — I60.9 SAH (SUBARACHNOID HEMORRHAGE) (HCC): Primary | ICD-10-CM

## 2025-06-29 DIAGNOSIS — S06.5XAA SDH (SUBDURAL HEMATOMA) (HCC): ICD-10-CM

## 2025-06-29 PROBLEM — S61.419A SKIN TEAR OF HAND WITHOUT COMPLICATION: Status: ACTIVE | Noted: 2025-06-29

## 2025-06-29 PROBLEM — S01.511A LIP LACERATION: Status: ACTIVE | Noted: 2025-06-29

## 2025-06-29 LAB
ABO GROUP BLD: NORMAL
ANION GAP SERPL CALCULATED.3IONS-SCNC: 4 MMOL/L (ref 4–13)
ANION GAP SERPL CALCULATED.3IONS-SCNC: 4 MMOL/L (ref 4–13)
BACTERIA UR QL AUTO: ABNORMAL /HPF
BACTERIA UR QL AUTO: ABNORMAL /HPF
BASE EXCESS BLDA CALC-SCNC: -1 MMOL/L (ref -2–3)
BASE EXCESS BLDA CALC-SCNC: -1 MMOL/L (ref -2–3)
BILIRUB UR QL STRIP: NEGATIVE
BILIRUB UR QL STRIP: NEGATIVE
BLD GP AB SCN SERPL QL: NEGATIVE
BLD GP AB SCN SERPL QL: NEGATIVE
BUN SERPL-MCNC: 38 MG/DL (ref 5–25)
BUN SERPL-MCNC: 38 MG/DL (ref 5–25)
CA-I BLD-SCNC: 1.2 MMOL/L (ref 1.12–1.32)
CA-I BLD-SCNC: 1.2 MMOL/L (ref 1.12–1.32)
CALCIUM SERPL-MCNC: 8.5 MG/DL (ref 8.4–10.2)
CALCIUM SERPL-MCNC: 8.5 MG/DL (ref 8.4–10.2)
CFFMA (FUNCTIONAL FIBRINOGEN MAX AMPLITUDE): 27.3 MM (ref 15–32)
CFFMA (FUNCTIONAL FIBRINOGEN MAX AMPLITUDE): 27.3 MM (ref 15–32)
CHLORIDE SERPL-SCNC: 107 MMOL/L (ref 96–108)
CHLORIDE SERPL-SCNC: 107 MMOL/L (ref 96–108)
CKLY30: 1.5 % (ref 0–2.6)
CKLY30: 1.5 % (ref 0–2.6)
CKR(REACTION TIME): 8.8 MIN (ref 4.6–9.1)
CKR(REACTION TIME): 8.8 MIN (ref 4.6–9.1)
CLARITY UR: ABNORMAL
CLARITY UR: ABNORMAL
CO2 SERPL-SCNC: 24 MMOL/L (ref 21–32)
CO2 SERPL-SCNC: 24 MMOL/L (ref 21–32)
COLOR UR: ABNORMAL
COLOR UR: ABNORMAL
CREAT SERPL-MCNC: 1.02 MG/DL (ref 0.6–1.3)
CREAT SERPL-MCNC: 1.02 MG/DL (ref 0.6–1.3)
CRTMA(RAPIDTEG MAX AMPLITUDE): 67.5 MM (ref 52–70)
CRTMA(RAPIDTEG MAX AMPLITUDE): 67.5 MM (ref 52–70)
ERYTHROCYTE [DISTWIDTH] IN BLOOD BY AUTOMATED COUNT: 15.3 % (ref 11.6–15.1)
ERYTHROCYTE [DISTWIDTH] IN BLOOD BY AUTOMATED COUNT: 15.3 % (ref 11.6–15.1)
GFR SERPL CREATININE-BSD FRML MDRD: 47 ML/MIN/1.73SQ M
GFR SERPL CREATININE-BSD FRML MDRD: 47 ML/MIN/1.73SQ M
GLUCOSE SERPL-MCNC: 121 MG/DL (ref 65–140)
GLUCOSE SERPL-MCNC: 121 MG/DL (ref 65–140)
GLUCOSE SERPL-MCNC: 123 MG/DL (ref 65–140)
GLUCOSE SERPL-MCNC: 123 MG/DL (ref 65–140)
GLUCOSE UR STRIP-MCNC: NEGATIVE MG/DL
GLUCOSE UR STRIP-MCNC: NEGATIVE MG/DL
HCO3 BLDA-SCNC: 24.3 MMOL/L (ref 24–30)
HCO3 BLDA-SCNC: 24.3 MMOL/L (ref 24–30)
HCT VFR BLD AUTO: 27.1 % (ref 34.8–46.1)
HCT VFR BLD AUTO: 27.1 % (ref 34.8–46.1)
HCT VFR BLD CALC: 32 % (ref 34.8–46.1)
HCT VFR BLD CALC: 32 % (ref 34.8–46.1)
HGB BLD-MCNC: 8.7 G/DL (ref 11.5–15.4)
HGB BLD-MCNC: 8.7 G/DL (ref 11.5–15.4)
HGB BLDA-MCNC: 10.9 G/DL (ref 11.5–15.4)
HGB BLDA-MCNC: 10.9 G/DL (ref 11.5–15.4)
HGB UR QL STRIP.AUTO: ABNORMAL
HGB UR QL STRIP.AUTO: ABNORMAL
KETONES UR STRIP-MCNC: NEGATIVE MG/DL
KETONES UR STRIP-MCNC: NEGATIVE MG/DL
LEUKOCYTE ESTERASE UR QL STRIP: ABNORMAL
LEUKOCYTE ESTERASE UR QL STRIP: ABNORMAL
MCH RBC QN AUTO: 28.7 PG (ref 26.8–34.3)
MCH RBC QN AUTO: 28.7 PG (ref 26.8–34.3)
MCHC RBC AUTO-ENTMCNC: 32.1 G/DL (ref 31.4–37.4)
MCHC RBC AUTO-ENTMCNC: 32.1 G/DL (ref 31.4–37.4)
MCV RBC AUTO: 89 FL (ref 82–98)
MCV RBC AUTO: 89 FL (ref 82–98)
MUCOUS THREADS UR QL AUTO: ABNORMAL
MUCOUS THREADS UR QL AUTO: ABNORMAL
NITRITE UR QL STRIP: POSITIVE
NITRITE UR QL STRIP: POSITIVE
NON-SQ EPI CELLS URNS QL MICRO: ABNORMAL /HPF
NON-SQ EPI CELLS URNS QL MICRO: ABNORMAL /HPF
PCO2 BLD: 26 MMOL/L (ref 21–32)
PCO2 BLD: 26 MMOL/L (ref 21–32)
PCO2 BLD: 42.2 MM HG (ref 42–50)
PCO2 BLD: 42.2 MM HG (ref 42–50)
PH BLD: 7.37 [PH] (ref 7.3–7.4)
PH BLD: 7.37 [PH] (ref 7.3–7.4)
PH UR STRIP.AUTO: 5 [PH]
PH UR STRIP.AUTO: 5 [PH]
PLATELET # BLD AUTO: 219 THOUSANDS/UL (ref 149–390)
PLATELET # BLD AUTO: 219 THOUSANDS/UL (ref 149–390)
PMV BLD AUTO: 8.4 FL (ref 8.9–12.7)
PMV BLD AUTO: 8.4 FL (ref 8.9–12.7)
PO2 BLD: 28 MM HG (ref 35–45)
PO2 BLD: 28 MM HG (ref 35–45)
POTASSIUM BLD-SCNC: 4.9 MMOL/L (ref 3.5–5.3)
POTASSIUM BLD-SCNC: 4.9 MMOL/L (ref 3.5–5.3)
POTASSIUM SERPL-SCNC: 4.7 MMOL/L (ref 3.5–5.3)
POTASSIUM SERPL-SCNC: 4.7 MMOL/L (ref 3.5–5.3)
PROT UR STRIP-MCNC: NEGATIVE MG/DL
PROT UR STRIP-MCNC: NEGATIVE MG/DL
RBC # BLD AUTO: 3.03 MILLION/UL (ref 3.81–5.12)
RBC # BLD AUTO: 3.03 MILLION/UL (ref 3.81–5.12)
RBC #/AREA URNS AUTO: ABNORMAL /HPF
RBC #/AREA URNS AUTO: ABNORMAL /HPF
RH BLD: POSITIVE
SAO2 % BLD FROM PO2: 51 % (ref 60–85)
SAO2 % BLD FROM PO2: 51 % (ref 60–85)
SODIUM BLD-SCNC: 137 MMOL/L (ref 136–145)
SODIUM BLD-SCNC: 137 MMOL/L (ref 136–145)
SODIUM SERPL-SCNC: 135 MMOL/L (ref 135–147)
SODIUM SERPL-SCNC: 135 MMOL/L (ref 135–147)
SP GR UR STRIP.AUTO: 1.03 (ref 1–1.03)
SP GR UR STRIP.AUTO: 1.03 (ref 1–1.03)
SPECIMEN EXPIRATION DATE: NORMAL
SPECIMEN EXPIRATION DATE: NORMAL
SPECIMEN SOURCE: ABNORMAL
SPECIMEN SOURCE: ABNORMAL
UROBILINOGEN UR STRIP-ACNC: <2 MG/DL
UROBILINOGEN UR STRIP-ACNC: <2 MG/DL
WBC # BLD AUTO: 8.62 THOUSAND/UL (ref 4.31–10.16)
WBC # BLD AUTO: 8.62 THOUSAND/UL (ref 4.31–10.16)
WBC #/AREA URNS AUTO: ABNORMAL /HPF
WBC #/AREA URNS AUTO: ABNORMAL /HPF

## 2025-06-29 PROCEDURE — 82947 ASSAY GLUCOSE BLOOD QUANT: CPT

## 2025-06-29 PROCEDURE — 82330 ASSAY OF CALCIUM: CPT

## 2025-06-29 PROCEDURE — 99291 CRITICAL CARE FIRST HOUR: CPT | Performed by: SURGERY

## 2025-06-29 PROCEDURE — 12011 RPR F/E/E/N/L/M 2.5 CM/<: CPT | Performed by: PHYSICIAN ASSISTANT

## 2025-06-29 PROCEDURE — 87077 CULTURE AEROBIC IDENTIFY: CPT | Performed by: PHYSICIAN ASSISTANT

## 2025-06-29 PROCEDURE — 82803 BLOOD GASES ANY COMBINATION: CPT

## 2025-06-29 PROCEDURE — 80048 BASIC METABOLIC PNL TOTAL CA: CPT | Performed by: PHYSICIAN ASSISTANT

## 2025-06-29 PROCEDURE — 71045 X-RAY EXAM CHEST 1 VIEW: CPT

## 2025-06-29 PROCEDURE — 96374 THER/PROPH/DIAG INJ IV PUSH: CPT

## 2025-06-29 PROCEDURE — 99285 EMERGENCY DEPT VISIT HI MDM: CPT

## 2025-06-29 PROCEDURE — 72125 CT NECK SPINE W/O DYE: CPT

## 2025-06-29 PROCEDURE — 84295 ASSAY OF SERUM SODIUM: CPT

## 2025-06-29 PROCEDURE — 76705 ECHO EXAM OF ABDOMEN: CPT | Performed by: PHYSICIAN ASSISTANT

## 2025-06-29 PROCEDURE — 86850 RBC ANTIBODY SCREEN: CPT | Performed by: SURGERY

## 2025-06-29 PROCEDURE — 73130 X-RAY EXAM OF HAND: CPT

## 2025-06-29 PROCEDURE — 87186 SC STD MICRODIL/AGAR DIL: CPT | Performed by: PHYSICIAN ASSISTANT

## 2025-06-29 PROCEDURE — 36415 COLL VENOUS BLD VENIPUNCTURE: CPT | Performed by: SURGERY

## 2025-06-29 PROCEDURE — 71260 CT THORAX DX C+: CPT

## 2025-06-29 PROCEDURE — 90715 TDAP VACCINE 7 YRS/> IM: CPT | Performed by: PHYSICIAN ASSISTANT

## 2025-06-29 PROCEDURE — 99223 1ST HOSP IP/OBS HIGH 75: CPT | Performed by: PHYSICIAN ASSISTANT

## 2025-06-29 PROCEDURE — 84132 ASSAY OF SERUM POTASSIUM: CPT

## 2025-06-29 PROCEDURE — 85384 FIBRINOGEN ACTIVITY: CPT | Performed by: PHYSICIAN ASSISTANT

## 2025-06-29 PROCEDURE — 74177 CT ABD & PELVIS W/CONTRAST: CPT

## 2025-06-29 PROCEDURE — 85397 CLOTTING FUNCT ACTIVITY: CPT | Performed by: PHYSICIAN ASSISTANT

## 2025-06-29 PROCEDURE — 85014 HEMATOCRIT: CPT

## 2025-06-29 PROCEDURE — NC001 PR NO CHARGE: Performed by: PHYSICIAN ASSISTANT

## 2025-06-29 PROCEDURE — 81001 URINALYSIS AUTO W/SCOPE: CPT | Performed by: PHYSICIAN ASSISTANT

## 2025-06-29 PROCEDURE — 90471 IMMUNIZATION ADMIN: CPT

## 2025-06-29 PROCEDURE — 93308 TTE F-UP OR LMTD: CPT | Performed by: PHYSICIAN ASSISTANT

## 2025-06-29 PROCEDURE — 87086 URINE CULTURE/COLONY COUNT: CPT | Performed by: PHYSICIAN ASSISTANT

## 2025-06-29 PROCEDURE — 0CQ0XZZ REPAIR UPPER LIP, EXTERNAL APPROACH: ICD-10-PCS | Performed by: SURGERY

## 2025-06-29 PROCEDURE — 86901 BLOOD TYPING SEROLOGIC RH(D): CPT | Performed by: SURGERY

## 2025-06-29 PROCEDURE — 70450 CT HEAD/BRAIN W/O DYE: CPT

## 2025-06-29 PROCEDURE — EDAIR PR ED AIR: Performed by: EMERGENCY MEDICINE

## 2025-06-29 PROCEDURE — 85576 BLOOD PLATELET AGGREGATION: CPT | Performed by: PHYSICIAN ASSISTANT

## 2025-06-29 PROCEDURE — 85027 COMPLETE CBC AUTOMATED: CPT | Performed by: PHYSICIAN ASSISTANT

## 2025-06-29 PROCEDURE — 86900 BLOOD TYPING SEROLOGIC ABO: CPT | Performed by: SURGERY

## 2025-06-29 PROCEDURE — 85347 COAGULATION TIME ACTIVATED: CPT | Performed by: PHYSICIAN ASSISTANT

## 2025-06-29 RX ORDER — AMIODARONE HYDROCHLORIDE 100 MG/1
100 TABLET ORAL DAILY
COMMUNITY

## 2025-06-29 RX ORDER — ACETAMINOPHEN 325 MG/1
975 TABLET ORAL EVERY 8 HOURS
Status: DISCONTINUED | OUTPATIENT
Start: 2025-06-29 | End: 2025-07-04 | Stop reason: HOSPADM

## 2025-06-29 RX ORDER — LIDOCAINE HYDROCHLORIDE AND EPINEPHRINE 10; 10 MG/ML; UG/ML
5 INJECTION, SOLUTION INFILTRATION; PERINEURAL ONCE
Status: COMPLETED | OUTPATIENT
Start: 2025-06-29 | End: 2025-06-29

## 2025-06-29 RX ORDER — LOSARTAN POTASSIUM 50 MG/1
50 TABLET ORAL DAILY
COMMUNITY

## 2025-06-29 RX ORDER — CARVEDILOL 25 MG/1
25 TABLET ORAL 2 TIMES DAILY WITH MEALS
COMMUNITY

## 2025-06-29 RX ORDER — LEVETIRACETAM 500 MG/1
500 TABLET ORAL 2 TIMES DAILY
Status: DISCONTINUED | OUTPATIENT
Start: 2025-06-29 | End: 2025-07-04 | Stop reason: HOSPADM

## 2025-06-29 RX ORDER — AMIODARONE HYDROCHLORIDE 200 MG/1
100 TABLET ORAL DAILY
Status: DISCONTINUED | OUTPATIENT
Start: 2025-06-30 | End: 2025-07-04 | Stop reason: HOSPADM

## 2025-06-29 RX ORDER — CARVEDILOL 12.5 MG/1
25 TABLET ORAL 2 TIMES DAILY WITH MEALS
Status: DISCONTINUED | OUTPATIENT
Start: 2025-06-29 | End: 2025-07-04 | Stop reason: HOSPADM

## 2025-06-29 RX ADMIN — LEVETIRACETAM 500 MG: 500 TABLET, FILM COATED ORAL at 10:27

## 2025-06-29 RX ADMIN — CARVEDILOL 25 MG: 12.5 TABLET, FILM COATED ORAL at 17:28

## 2025-06-29 RX ADMIN — LIDOCAINE HYDROCHLORIDE,EPINEPHRINE BITARTRATE 5 ML: 10; .01 INJECTION, SOLUTION INFILTRATION; PERINEURAL at 05:45

## 2025-06-29 RX ADMIN — IOHEXOL 85 ML: 350 INJECTION, SOLUTION INTRAVENOUS at 04:53

## 2025-06-29 RX ADMIN — LEVETIRACETAM 500 MG: 500 TABLET, FILM COATED ORAL at 17:29

## 2025-06-29 RX ADMIN — ACETAMINOPHEN 975 MG: 325 TABLET ORAL at 23:09

## 2025-06-29 RX ADMIN — Medication 2000 UNITS: at 05:27

## 2025-06-29 RX ADMIN — ACETAMINOPHEN 975 MG: 325 TABLET ORAL at 10:26

## 2025-06-29 RX ADMIN — TETANUS TOXOID, REDUCED DIPHTHERIA TOXOID AND ACELLULAR PERTUSSIS VACCINE, ADSORBED 0.5 ML: 5; 2.5; 8; 8; 2.5 SUSPENSION INTRAMUSCULAR at 05:10

## 2025-06-29 RX ADMIN — ACETAMINOPHEN 975 MG: 325 TABLET ORAL at 17:28

## 2025-06-29 NOTE — PROCEDURES
POC FAST US    Date/Time: 6/29/2025 5:32 AM    Performed by: Zakia Wagoner PA-C  Authorized by: Zakia Wagoner PA-C    Patient location:  Trauma  Procedure details:     Exam Type:  Diagnostic    Indications: blunt abdominal trauma and blunt chest trauma      Assess for:  Intra-abdominal fluid and pericardial effusion    Technique: FAST      Views obtained:  Heart - Pericardial sac, LUQ - Splenorenal space, Suprapubic - Pouch of Magdaleno and RUQ - Kamara's Pouch    Image quality: diagnostic      Image availability:  Images available in PACS and video obtained  FAST Findings:     RUQ (Hepatorenal) free fluid: absent      LUQ (Splenorenal) free fluid: indeterminate      Suprapubic free fluid: absent      Cardiac wall motion: identified      Pericardial effusion: absent    Interpretation:     Impressions: indeterminate    Comments:      Poor window in LUQ

## 2025-06-29 NOTE — CONSULTS
Consultation - Neurosurgery   Name: Alice Rangel 89 y.o. female I MRN: [id number]  Unit/Bed#: S -01 I Date of Admission: 6/29/2025   Date of Service: 6/29/2025 I Hospital Day: 0   Inpatient consult to Neurosurgery  Consult performed by: Ksenia Jaramillo PA-C  Consult ordered by: Zakia Wagoner PA-C        Physician Requesting Evaluation: Enrique Ferris,*   Reason for Evaluation / Principal Problem: Subdural hematoma.     Assessment & Plan  SDH (subdural hematoma) (HCC)  Patient with acute interhemispheric subdural hemorrhage.    Imaging reviewed personally and by attending. Final results as below  CT head wo 6/29/2025: Tiny amount of acute interhemispheric subdural hemorrhage.  Tiny amount of left paramedian frontal acute subarachnoid hemorrhage.  No mass effect or midline shift.  CT head without contrast 6/29/2025: Grossly stable acute interhemispheric subdural hemorrhage and tiny left paramedian subarachnoid hemorrhage.  Final read pending    Plan  Continue frequent neurological checks.   STAT CT head with any neurological decline or a decrease in GCS of 2pts within 1 hr.  Recommend SBP <160 mmHg.  Keppra for seizure ppx per primary team.  Hold/ avoid all antiplatelet and anticoagulation medications.   Pain control per primary team  Mobilize and eval by PT/OT when able to.   DVT PPX: SCDs.  Recommend holding from DVT prophylaxis today and could consider starting pharmacological DVT prophylaxis starting tomorrow if patient remains neurologically stable.  Ongoing medical management per primary team.  No neurosurgical intervention is anticipated at this time.   Neurosurgery will see patient as needed during the remainder of this hospitalization.  Anticipate follow-up in 2 weeks with repeat CT head. Please call with any questions/concerns.     Fall    Lip laceration    Skin tear of hand without complication    SAH (subarachnoid hemorrhage) (HCC)      History of Present Illness   HPI: Alice Rangel is a  89 y.o. year old female who presented to the ED status post fall.  She was on Xarelto prior to admission which was reversed with Kcentra.  Patient reports she has facial pain especially around her mouth and jaw where she has abrasions and ecchymosis.  Patient also reports she has a headache.  She reports pain especially in the right hand where she has injury and ecchymosis. She denies dizziness or visual disturbance.  She denies new numbness, tingling, or weakness in bilateral upper and lower extremities.    Review of Systems   Constitutional:  Negative for chills and fever.   HENT:  Negative for hearing loss.    Eyes:  Negative for pain and visual disturbance.   Respiratory:  Negative for chest tightness and shortness of breath.    Cardiovascular:  Negative for chest pain and palpitations.   Gastrointestinal:  Negative for abdominal pain, nausea and vomiting.   Genitourinary:  Negative for dysuria.   Musculoskeletal:  Negative for back pain, neck pain and neck stiffness.   Skin:  Negative for pallor and rash.   Neurological:  Negative for dizziness, light-headedness and numbness.   Psychiatric/Behavioral:  Negative for agitation, behavioral problems, confusion and decreased concentration.      Medical History Review: I have reviewed the patient's PMH, PSH, Social History, Family History, Meds, and Allergies   Historical Information   Past Medical History[1]  Past Surgical History[2]  Social History[3]  No existing history information found.  No existing history information found.    Social History[4]    Current Facility-Administered Medications:     acetaminophen (TYLENOL) tablet 975 mg, Q8H    [START ON 6/30/2025] amiodarone tablet 100 mg, Daily    carvedilol (COREG) tablet 25 mg, BID With Meals    levETIRAcetam (KEPPRA) tablet 500 mg, BID  Prior to Admission Medications   Prescriptions Last Dose Informant Patient Reported? Taking?   amiodarone 100 mg tablet   Yes Yes   Sig: Take 100 mg by mouth daily   carvedilol  (COREG) 25 mg tablet   Yes Yes   Sig: Take 25 mg by mouth 2 (two) times a day with meals   losartan (COZAAR) 50 mg tablet   Yes Yes   Sig: Take 50 mg by mouth daily   rivaroxaban (XARELTO) 15 mg tablet   Yes Yes   Sig: Take 15 mg by mouth daily with breakfast      Facility-Administered Medications: None     Patient has no allergy information on record.    Objective :  Temp:  [97.6 °F (36.4 °C)-98.4 °F (36.9 °C)] 98.1 °F (36.7 °C)  HR:  [60-83] 67  BP: ()/(54-81) 136/66  Resp:  [14-18] 15  SpO2:  [90 %-98 %] 96 %  O2 Device: None (Room air)  Nasal Cannula O2 Flow Rate (L/min):  [2 L/min] 2 L/min    Physical Exam  Constitutional:       General: She is not in acute distress.     Appearance: She is well-developed.   HENT:      Head: Normocephalic and atraumatic.     Eyes:      Extraocular Movements: Extraocular movements intact.      Pupils: Pupils are equal, round, and reactive to light.     Neck:      Trachea: No tracheal deviation.     Cardiovascular:      Rate and Rhythm: Normal rate.   Pulmonary:      Effort: Pulmonary effort is normal.   Abdominal:      Palpations: Abdomen is soft.      Tenderness: There is no abdominal tenderness. There is no guarding.     Musculoskeletal:      Cervical back: Normal range of motion and neck supple.     Skin:     General: Skin is warm and dry.      Coloration: Skin is not pale.      Comments: Perioral ecchymosis and swelling.  Right hand ecchymosis and swelling with a bandage.     Neurological:      Mental Status: She is alert.      Comments: GCS 15, Awake, Alert, Oriented x 3,speech is clear and fluent.     Motor: POE, strength 4+/5 throughout except RUE SF/EF 4-/5,  wrist/hand immobilized by bandage (able to wiggle fingers).    Sensation:  intact to LT X 4     Reflexes:  no left abdalla's or clonus     Coordination: RUE slight drift due to pain and injury, no drift LUE.          Psychiatric:         Behavior: Behavior normal.      Neurological Exam  Mental  Status  Alert.    Cranial Nerves  CN III, IV, VI: Extraocular movements intact bilaterally. Pupils equal round and reactive to light bilaterally.  GCS 15, Awake, Alert, Oriented x 3,speech is clear and fluent.     Motor: POE, strength 4+/5 throughout except RUE SF/EF 4-/5,  wrist/hand immobilized by bandage (able to wiggle fingers).    Sensation:  intact to LT X 4     Reflexes:  no left abdalla's or clonus     Coordination: RUE slight drift due to pain and injury, no drift LUE.       .        Lab Results: I have reviewed the following results:  Recent Labs     06/29/25  0445 06/29/25  0638   WBC  --  8.62   HGB 10.9* 8.7*   HCT 32* 27.1*   PLT  --  219   SODIUM  --  135   K  --  4.7   CL  --  107   CO2 26 24   BUN  --  38*   CREATININE  --  1.02   GLUC  --  121   CAIONIZED 1.20  --        VTE Pharmacologic Prophylaxis: Sequential compression device (Venodyne)     Administrative Statements          [1] No past medical history on file.  [2] No past surgical history on file.  [3]    [4]

## 2025-06-29 NOTE — PHYSICAL THERAPY NOTE
Physical Therapy Cancellation Note       06/29/25 2539   Note Type   Note type Cancelled Session   Cancel Reasons Other   Additional Comments referral received for PT eval and tx. pt is pending results of right hand x-ray to rule out fx following a fall. will await imaging results and perform eval as appropriate and schedule allows.     John Serrato, PT

## 2025-06-29 NOTE — PROCEDURES
Universal Protocol:  Consent: Verbal consent obtained  Consent given by: patient  Patient understanding: patient states understanding of the procedure being performed  Laceration repair    Date/Time: 6/29/2025 6:27 AM    Performed by: Zakia Wagoner PA-C  Authorized by: Zakia Wagoner PA-C  Body area: mouth  Location details: upper lip, interior  Laceration length: 2 cm  Anesthesia: local infiltration    Anesthesia:  Local Anesthetic: lidocaine 1% with epinephrine  Anesthetic total: 6 mL    Sedation:  Patient sedated: no      Wound Dehiscence:  Superficial Wound Dehiscence: simple closure      Procedure Details:  Irrigation solution: saline  Irrigation method: syringe  Amount of cleaning: standard  Mucous membrane closure: 5-0 Chromic gut  Number of sutures: 1  Technique: simple  Approximation: loose  Approximation difficulty: simple  Dressing: 4x4 sterile gauze (and Sericel)

## 2025-06-29 NOTE — DISCHARGE INSTR - AVS FIRST PAGE
Hold home Xarelto until you follow-up with neurosurgery with repeat CT head in 2 weeks.  At that time they may consider resuming.  Continue 3-day course of Bactrim for UTI.  Continue 7-day course of Keppra for seizure prophylaxis.  Continue all other home meds-no other medication changes.  Please call trauma clinic with any questions or concerns.      Neurosurgery discharge instructions following traumatic head bleed:     Do not take any blood thinning medications (ie. No Advil. No motrin. No ibuprofen. No Aleve. No Aspirin. No fishoil. No heparin. No antiplatelet / no anticoagulation medication).  Refrain from activity that increases chance of trauma to head or falls. Recommend you take fall precaution.  No strenuous activity or sports.  Return to hospital Emergency Room if you experience worsening / new headache, nausea/vomiting, speech/vision change, seizure, confusion / mental status change, weakness, or other neurological changes.      Follow-up as scheduled with a repeat CT head without contrast to be completed 2-3 days prior to visit.  Prescription has been entered electronically.  Please call [phone] to schedule.

## 2025-06-29 NOTE — H&P
H&P - Trauma   Name: Alice Rangel 89 y.o. female I MRN: [id number]  Unit/Bed#: TR-03 I Date of Admission: 6/29/2025   Date of Service: 6/29/2025 I Hospital Day: 0     Assessment & Plan  Fall  - Status post fall with the below noted injuries.  - Fall precautions.    SAH (subarachnoid hemorrhage) (HCC)  SDH (subdural hematoma) (HCC)  - Neuro exam: GCS 15, non-focal  - Continue neurologic checks: Every 1 hours.  - Reversal agent administered: K-Centra  - CT scan of the head on 6/29 reviewed: tiny interhemispheric SDH, tiny left paramedian frontal SAH  - Appreciate Neurosurgery evaluation and recommendations.  - Complete 7 day course of Keppra for seizure prophylaxis  - Chemical DVT prophylaxis: Not cleared for chemical prophylaxis by neurosurgery at this time. Continue SCDs bilaterally.   - Hold all anticoagulants and anti platelet medications for 2 weeks and/or until cleared by Neurosurgery to resume.  - PT and OT (including cognitive evaluation) evaluation and treatment as indicated.    Lip laceration  - Upper buccal lip laceration with active bleeding  - Improved with Surgicel and ice  Skin tear of hand without complication  - Right dorsal hand skin tear x 2   - Local wound care  - Follow up wound care center    Trauma Alert: Level B   Model of Arrival: Ambulance    Trauma Team: Attending Josy and LUIS Wagoner  Consultants:     Neurosurgery: routine consult; Epic consult order placed;     History of Present Illness   Chief Complaint: Hand pain  Mechanism:Fall     Alice Rangel is a 89 y.o. 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 She reports immediate bleeding from her mouth and nose. She did not lose consciousness. She complains of pain in her right hand at the site of two skin tears. She denies headache, neck pain, chest pain, shortness of breath, abdominal pain, nausea, vomiting, or other extremity pain.      Review of Systems   Constitutional:  Negative for activity change, appetite change, diaphoresis, fatigue and fever.   HENT:  Negative for congestion, facial swelling, nosebleeds, postnasal drip, rhinorrhea, sinus pressure, sinus pain and sore throat.    Eyes:  Negative for photophobia and visual disturbance.   Respiratory:  Negative for cough, shortness of breath and wheezing.    Cardiovascular:  Negative for chest pain and palpitations.   Gastrointestinal:  Negative for abdominal distention, abdominal pain, blood in stool, constipation, diarrhea, nausea and vomiting.   Genitourinary:  Negative for decreased urine volume, dysuria, flank pain, frequency and hematuria.   Musculoskeletal:  Negative for arthralgias, back pain and neck stiffness.   Skin:  Negative for wound.   Neurological:  Negative for dizziness, seizures, syncope, facial asymmetry, weakness, light-headedness, numbness and headaches.     Medical History Review: I have reviewed the patient's PMH, PSH, Social History, Family History, Meds, and Allergies   There is no immunization history for the selected administration types on file for this patient.  Last Tetanus: update today      ISAR Score: Did you order a geriatric consult if the score was 2 or greater?: n/a (ISAR) Identification of Seniors at Risk  Before the illness or injury that brought you to the Emergency, did you need someone to help you on a regular basis?: 0  In the last 24 hours, have you needed more help than usual?: 0  Have you been hospitalized for one or more nights during the past 6 months?: 0  In general, do you see well?: 0  In general, do you have serious problems with your memory?: 0  Do you take more than three different medications every day?: 1  ISAR Score: 1           Objective :  Temp:  [97.7 °F (36.5 °C)] 97.7 °F (36.5 °C)  HR:  [81] 81  BP: (162-170)/(79-81) 162/79  Resp:  [18] 18  SpO2:  [93 %-94 %] 94 %  O2 Device: None (Room air)    Initial Vitals:   Temperature:  97.7 °F (36.5 °C) (06/29/25 0437)  Pulse: 81 (06/29/25 0437)  Respirations: 18 (06/29/25 0437)  Blood Pressure: 170/81 (06/29/25 0437)    Primary Survey:   Airway:        Status: patent;        Pre-hospital Interventions: none        Hospital Interventions: none  Breathing:        Pre-hospital Interventions: none       Effort: normal       Right breath sounds: normal       Left breath sounds: normal  Circulation:        Rhythm: regular       Rate: regular   Right Pulses Left Pulses    R radial: 2+  R femoral: 2+  R pedal: 2+     L radial: 2+  L femoral: 2+  L pedal: 2+       Disability:        GCS: Eye: 4; Verbal: 5 Motor: 6 Total: 15       Right Pupil: round;  reactive         Left Pupil:  round;  reactive      R Motor Strength L Motor Strength    R : 5/5  R dorsiflex: 5/5  R plantarflex: 5/5 L : 5/5  L dorsiflex: 5/5  L plantarflex: 5/5        Sensory:  No sensory deficit  Exposure:       Completed: Yes      Secondary Survey:  Physical Exam  Vitals and nursing note reviewed.   Constitutional:       General: She is not in acute distress.     Appearance: Normal appearance. She is obese. She is not ill-appearing or toxic-appearing.   HENT:      Head: Normocephalic and atraumatic.      Right Ear: Tympanic membrane normal.      Left Ear: Tympanic membrane normal.      Nose: Nose normal. No congestion or rhinorrhea.      Mouth/Throat:      Mouth: Mucous membranes are moist. Injury (stellate laceration to the buccal surface upper lip with active bleeding) present.      Pharynx: No oropharyngeal exudate or posterior oropharyngeal erythema.     Eyes:      Extraocular Movements: Extraocular movements intact.      Conjunctiva/sclera: Conjunctivae normal.      Pupils: Pupils are equal, round, and reactive to light.       Cardiovascular:      Rate and Rhythm: Normal rate and regular rhythm.      Heart sounds: No murmur heard.     No friction rub. No gallop.   Pulmonary:      Effort: Pulmonary effort is normal. No  respiratory distress.      Breath sounds: No wheezing, rhonchi or rales.   Abdominal:      General: There is no distension.      Palpations: Abdomen is soft.      Tenderness: There is no abdominal tenderness. There is no guarding or rebound.     Musculoskeletal:      Right shoulder: Normal.      Left shoulder: Normal.      Right upper arm: Normal.      Left upper arm: Normal.      Right elbow: Normal.      Left elbow: Normal.      Right forearm: Normal.      Left forearm: Normal.      Right wrist: Normal.      Left wrist: Normal.      Right hand: Normal.      Left hand: Normal.      Cervical back: Normal range of motion. No deformity, signs of trauma, lacerations or tenderness.      Thoracic back: No deformity, signs of trauma or tenderness.      Lumbar back: No deformity, signs of trauma or tenderness.      Right hip: Normal. No deformity or tenderness. Normal range of motion.      Left hip: Normal.      Right upper leg: No swelling, deformity or tenderness.      Left upper leg: Normal. No swelling, deformity or tenderness.      Right knee: Normal.      Left knee: Normal.      Right lower leg: Normal.      Left lower leg: Normal.      Right ankle: Normal.      Left ankle: Normal.      Right foot: Normal.      Left foot: Normal.     Skin:     General: Skin is warm and dry.      Capillary Refill: Capillary refill takes less than 2 seconds.      Findings: Bruising (Right medial breast) and laceration (buccal surface upper lip with active bleeding) present. No lesion.          Neurological:      General: No focal deficit present.      Mental Status: She is alert and oriented to person, place, and time.      Sensory: No sensory deficit.      Motor: No weakness.             Lab Results: I have reviewed the following results:  Recent Labs     06/29/25  0445   HGB 10.9*   HCT 32*   CO2 26   CAIONIZED 1.20       Imaging Results: I have personally reviewed pertinent images saved in PACS. CT scan findings (and other pertinent  positive findings on images) were discussed with radiology. My interpretation of the images/reports are as follows:  Chest Xray(s): negative for acute findings   FAST exam(s): Indeterminate   CT Scan(s): positive for acute findings: SAH/SDH   Additional Xray(s): N/A

## 2025-06-29 NOTE — ED PROVIDER NOTES
Emergency Department Airway Evaluation and Management Form    History  Obtained from: patient and EMS.   Patient has no allergy information on record.  Chief Complaint   Patient presents with    Trauma     HPI Patient is a 89 year old female who got up to go to the bathroom and tripped and fell face first and struck her head and face on the floor. Patient is on xarelto. Airway intact. (+) upper lip laceration. PERRL. No hemotympanum. ED resident Dr. Radha Main evaluated patient's airway in trauma ED. Trauma team evaluating patient in trauma ED.     Past Medical History[1]  Past Surgical History[2]  Family History[3]  Social History[4]  I have reviewed and agree with the history as documented.    Review of Systems    Physical Exam  /79   Pulse 81   Temp 97.7 °F (36.5 °C) (Oral)   Resp 18   Wt 84.3 kg (185 lb 13.6 oz)   SpO2 94%     Physical Exam    ED Medications  Medications - No data to display    Intubation  Procedures    Notes      Final Diagnosis  Final diagnoses:   None       ED Provider  Electronically Signed by       [1] No past medical history on file.  [2] No past surgical history on file.  [3] No family history on file.  [4]         Rome Ramirez MD  06/29/25 4772

## 2025-06-29 NOTE — ASSESSMENT & PLAN NOTE
Patient with acute interhemispheric subdural hemorrhage.    Imaging reviewed personally and by attending. Final results as below  CT head wo 6/29/2025: Tiny amount of acute interhemispheric subdural hemorrhage.  Tiny amount of left paramedian frontal acute subarachnoid hemorrhage.  No mass effect or midline shift.  CT head without contrast 6/29/2025: Grossly stable acute interhemispheric subdural hemorrhage and tiny left paramedian subarachnoid hemorrhage.  Final read pending    Plan  Continue frequent neurological checks.   STAT CT head with any neurological decline or a decrease in GCS of 2pts within 1 hr.  Recommend SBP <160 mmHg.  Keppra for seizure ppx per primary team.  Hold/ avoid all antiplatelet and anticoagulation medications.   Pain control per primary team  Mobilize and eval by PT/OT when able to.   DVT PPX: SCDs.  Recommend holding from DVT prophylaxis today and could consider starting pharmacological DVT prophylaxis starting tomorrow if patient remains neurologically stable.  Ongoing medical management per primary team.  No neurosurgical intervention is anticipated at this time.   Neurosurgery will see patient as needed during the remainder of this hospitalization.  Anticipate follow-up in 2 weeks with repeat CT head. Please call with any questions/concerns.

## 2025-06-29 NOTE — QUICK NOTE
Cervical Collar Clearance:    The patient had a CT scan of the cervical spine demonstrating no acute injury. On exam, the patient had no midline point tenderness or paresthesias/numbness/weakness in the extremities. The patient had full range of motion (was then able to flex, extend, and rotate head laterally) without pain. There were no distracting injuries and the patient was not intoxicated.      The patient's cervical spine was cleared radiologically and clinically. Cervical collar removed at this time.     Zakia Wagoner PA-C  6/29/2025 6:28 AM

## 2025-06-29 NOTE — ASSESSMENT & PLAN NOTE
- Neuro exam: GCS 15, non-focal  - Continue neurologic checks: Every 1 hours.  - Reversal agent administered: K-Centra  - CT scan of the head on 6/29 reviewed: tiny interhemispheric SDH, tiny left paramedian frontal SAH  - Appreciate Neurosurgery evaluation and recommendations.  - Complete 7 day course of Keppra for seizure prophylaxis  - Chemical DVT prophylaxis: Not cleared for chemical prophylaxis by neurosurgery at this time. Continue SCDs bilaterally.   - Hold all anticoagulants and anti platelet medications for 2 weeks and/or until cleared by Neurosurgery to resume.  - PT and OT (including cognitive evaluation) evaluation and treatment as indicated.

## 2025-06-30 ENCOUNTER — TELEPHONE (OUTPATIENT)
Dept: NEUROSURGERY | Facility: CLINIC | Age: 89
End: 2025-06-30

## 2025-06-30 PROBLEM — R41.89 COGNITIVE IMPAIRMENT: Status: ACTIVE | Noted: 2025-06-30

## 2025-06-30 PROBLEM — D64.9 ANEMIA: Status: ACTIVE | Noted: 2025-06-30

## 2025-06-30 PROBLEM — Z91.89 AT RISK FOR DELIRIUM: Status: ACTIVE | Noted: 2025-06-30

## 2025-06-30 PROBLEM — N39.0 UTI (URINARY TRACT INFECTION): Status: ACTIVE | Noted: 2025-06-30

## 2025-06-30 LAB
25(OH)D3 SERPL-MCNC: 32.7 NG/ML (ref 30–100)
25(OH)D3 SERPL-MCNC: 32.7 NG/ML (ref 30–100)
ANION GAP SERPL CALCULATED.3IONS-SCNC: 5 MMOL/L (ref 4–13)
ANION GAP SERPL CALCULATED.3IONS-SCNC: 5 MMOL/L (ref 4–13)
BASOPHILS # BLD AUTO: 0.05 THOUSANDS/ÂΜL (ref 0–0.1)
BASOPHILS # BLD AUTO: 0.05 THOUSANDS/ÂΜL (ref 0–0.1)
BASOPHILS NFR BLD AUTO: 1 % (ref 0–1)
BASOPHILS NFR BLD AUTO: 1 % (ref 0–1)
BUN SERPL-MCNC: 38 MG/DL (ref 5–25)
BUN SERPL-MCNC: 38 MG/DL (ref 5–25)
CALCIUM SERPL-MCNC: 8.8 MG/DL (ref 8.4–10.2)
CALCIUM SERPL-MCNC: 8.8 MG/DL (ref 8.4–10.2)
CHLORIDE SERPL-SCNC: 106 MMOL/L (ref 96–108)
CHLORIDE SERPL-SCNC: 106 MMOL/L (ref 96–108)
CO2 SERPL-SCNC: 25 MMOL/L (ref 21–32)
CO2 SERPL-SCNC: 25 MMOL/L (ref 21–32)
CREAT SERPL-MCNC: 1.11 MG/DL (ref 0.6–1.3)
CREAT SERPL-MCNC: 1.11 MG/DL (ref 0.6–1.3)
EOSINOPHIL # BLD AUTO: 0.11 THOUSAND/ÂΜL (ref 0–0.61)
EOSINOPHIL # BLD AUTO: 0.11 THOUSAND/ÂΜL (ref 0–0.61)
EOSINOPHIL NFR BLD AUTO: 2 % (ref 0–6)
EOSINOPHIL NFR BLD AUTO: 2 % (ref 0–6)
ERYTHROCYTE [DISTWIDTH] IN BLOOD BY AUTOMATED COUNT: 15 % (ref 11.6–15.1)
ERYTHROCYTE [DISTWIDTH] IN BLOOD BY AUTOMATED COUNT: 15 % (ref 11.6–15.1)
GFR SERPL CREATININE-BSD FRML MDRD: 42 ML/MIN/1.73SQ M
GFR SERPL CREATININE-BSD FRML MDRD: 42 ML/MIN/1.73SQ M
GLUCOSE SERPL-MCNC: 94 MG/DL (ref 65–140)
GLUCOSE SERPL-MCNC: 94 MG/DL (ref 65–140)
HCT VFR BLD AUTO: 26.3 % (ref 34.8–46.1)
HCT VFR BLD AUTO: 26.3 % (ref 34.8–46.1)
HGB BLD-MCNC: 8.5 G/DL (ref 11.5–15.4)
HGB BLD-MCNC: 8.5 G/DL (ref 11.5–15.4)
IMM GRANULOCYTES # BLD AUTO: 0.02 THOUSAND/UL (ref 0–0.2)
IMM GRANULOCYTES # BLD AUTO: 0.02 THOUSAND/UL (ref 0–0.2)
IMM GRANULOCYTES NFR BLD AUTO: 0 % (ref 0–2)
IMM GRANULOCYTES NFR BLD AUTO: 0 % (ref 0–2)
LYMPHOCYTES # BLD AUTO: 1.81 THOUSANDS/ÂΜL (ref 0.6–4.47)
LYMPHOCYTES # BLD AUTO: 1.81 THOUSANDS/ÂΜL (ref 0.6–4.47)
LYMPHOCYTES NFR BLD AUTO: 24 % (ref 14–44)
LYMPHOCYTES NFR BLD AUTO: 24 % (ref 14–44)
MCH RBC QN AUTO: 28.2 PG (ref 26.8–34.3)
MCH RBC QN AUTO: 28.2 PG (ref 26.8–34.3)
MCHC RBC AUTO-ENTMCNC: 32.3 G/DL (ref 31.4–37.4)
MCHC RBC AUTO-ENTMCNC: 32.3 G/DL (ref 31.4–37.4)
MCV RBC AUTO: 87 FL (ref 82–98)
MCV RBC AUTO: 87 FL (ref 82–98)
MONOCYTES # BLD AUTO: 0.83 THOUSAND/ÂΜL (ref 0.17–1.22)
MONOCYTES # BLD AUTO: 0.83 THOUSAND/ÂΜL (ref 0.17–1.22)
MONOCYTES NFR BLD AUTO: 11 % (ref 4–12)
MONOCYTES NFR BLD AUTO: 11 % (ref 4–12)
NEUTROPHILS # BLD AUTO: 4.76 THOUSANDS/ÂΜL (ref 1.85–7.62)
NEUTROPHILS # BLD AUTO: 4.76 THOUSANDS/ÂΜL (ref 1.85–7.62)
NEUTS SEG NFR BLD AUTO: 62 % (ref 43–75)
NEUTS SEG NFR BLD AUTO: 62 % (ref 43–75)
NRBC BLD AUTO-RTO: 0 /100 WBCS
NRBC BLD AUTO-RTO: 0 /100 WBCS
PLATELET # BLD AUTO: 239 THOUSANDS/UL (ref 149–390)
PLATELET # BLD AUTO: 239 THOUSANDS/UL (ref 149–390)
PMV BLD AUTO: 9.1 FL (ref 8.9–12.7)
PMV BLD AUTO: 9.1 FL (ref 8.9–12.7)
POTASSIUM SERPL-SCNC: 4.3 MMOL/L (ref 3.5–5.3)
POTASSIUM SERPL-SCNC: 4.3 MMOL/L (ref 3.5–5.3)
RBC # BLD AUTO: 3.01 MILLION/UL (ref 3.81–5.12)
RBC # BLD AUTO: 3.01 MILLION/UL (ref 3.81–5.12)
SODIUM SERPL-SCNC: 136 MMOL/L (ref 135–147)
SODIUM SERPL-SCNC: 136 MMOL/L (ref 135–147)
TSH SERPL DL<=0.05 MIU/L-ACNC: 1.98 UIU/ML (ref 0.45–4.5)
TSH SERPL DL<=0.05 MIU/L-ACNC: 1.98 UIU/ML (ref 0.45–4.5)
VIT B12 SERPL-MCNC: 606 PG/ML (ref 180–914)
VIT B12 SERPL-MCNC: 606 PG/ML (ref 180–914)
WBC # BLD AUTO: 7.58 THOUSAND/UL (ref 4.31–10.16)
WBC # BLD AUTO: 7.58 THOUSAND/UL (ref 4.31–10.16)

## 2025-06-30 PROCEDURE — 80048 BASIC METABOLIC PNL TOTAL CA: CPT | Performed by: PHYSICIAN ASSISTANT

## 2025-06-30 PROCEDURE — 84443 ASSAY THYROID STIM HORMONE: CPT | Performed by: FAMILY MEDICINE

## 2025-06-30 PROCEDURE — 82607 VITAMIN B-12: CPT | Performed by: FAMILY MEDICINE

## 2025-06-30 PROCEDURE — 85025 COMPLETE CBC W/AUTO DIFF WBC: CPT | Performed by: PHYSICIAN ASSISTANT

## 2025-06-30 PROCEDURE — 97167 OT EVAL HIGH COMPLEX 60 MIN: CPT

## 2025-06-30 PROCEDURE — 99233 SBSQ HOSP IP/OBS HIGH 50: CPT | Performed by: PHYSICIAN ASSISTANT

## 2025-06-30 PROCEDURE — 97163 PT EVAL HIGH COMPLEX 45 MIN: CPT

## 2025-06-30 PROCEDURE — 99223 1ST HOSP IP/OBS HIGH 75: CPT | Performed by: FAMILY MEDICINE

## 2025-06-30 PROCEDURE — 87081 CULTURE SCREEN ONLY: CPT | Performed by: SURGERY

## 2025-06-30 PROCEDURE — 97116 GAIT TRAINING THERAPY: CPT

## 2025-06-30 PROCEDURE — 82306 VITAMIN D 25 HYDROXY: CPT | Performed by: FAMILY MEDICINE

## 2025-06-30 RX ORDER — SODIUM CHLORIDE, SODIUM GLUCONATE, SODIUM ACETATE, POTASSIUM CHLORIDE, MAGNESIUM CHLORIDE, SODIUM PHOSPHATE, DIBASIC, AND POTASSIUM PHOSPHATE .53; .5; .37; .037; .03; .012; .00082 G/100ML; G/100ML; G/100ML; G/100ML; G/100ML; G/100ML; G/100ML
500 INJECTION, SOLUTION INTRAVENOUS ONCE
Status: COMPLETED | OUTPATIENT
Start: 2025-06-30 | End: 2025-06-30

## 2025-06-30 RX ORDER — SULFAMETHOXAZOLE AND TRIMETHOPRIM 800; 160 MG/1; MG/1
1 TABLET ORAL EVERY 12 HOURS SCHEDULED
Status: COMPLETED | OUTPATIENT
Start: 2025-06-30 | End: 2025-07-02

## 2025-06-30 RX ORDER — POLYETHYLENE GLYCOL 3350 17 G/17G
17 POWDER, FOR SOLUTION ORAL DAILY
Status: DISCONTINUED | OUTPATIENT
Start: 2025-07-01 | End: 2025-07-04 | Stop reason: HOSPADM

## 2025-06-30 RX ORDER — ENOXAPARIN SODIUM 100 MG/ML
30 INJECTION SUBCUTANEOUS EVERY 12 HOURS SCHEDULED
Status: DISCONTINUED | OUTPATIENT
Start: 2025-06-30 | End: 2025-07-04 | Stop reason: HOSPADM

## 2025-06-30 RX ORDER — AMOXICILLIN 250 MG
1 CAPSULE ORAL
Status: DISCONTINUED | OUTPATIENT
Start: 2025-06-30 | End: 2025-07-01

## 2025-06-30 RX ADMIN — ACETAMINOPHEN 975 MG: 325 TABLET ORAL at 08:24

## 2025-06-30 RX ADMIN — SULFAMETHOXAZOLE AND TRIMETHOPRIM 1 TABLET: 800; 160 TABLET ORAL at 11:37

## 2025-06-30 RX ADMIN — SULFAMETHOXAZOLE AND TRIMETHOPRIM 1 TABLET: 800; 160 TABLET ORAL at 20:38

## 2025-06-30 RX ADMIN — CARVEDILOL 25 MG: 12.5 TABLET, FILM COATED ORAL at 15:41

## 2025-06-30 RX ADMIN — CARVEDILOL 25 MG: 12.5 TABLET, FILM COATED ORAL at 08:22

## 2025-06-30 RX ADMIN — ENOXAPARIN SODIUM 30 MG: 30 INJECTION SUBCUTANEOUS at 20:39

## 2025-06-30 RX ADMIN — LEVETIRACETAM 500 MG: 500 TABLET, FILM COATED ORAL at 17:00

## 2025-06-30 RX ADMIN — AMIODARONE HYDROCHLORIDE 100 MG: 200 TABLET ORAL at 08:22

## 2025-06-30 RX ADMIN — SODIUM CHLORIDE, SODIUM GLUCONATE, SODIUM ACETATE, POTASSIUM CHLORIDE, MAGNESIUM CHLORIDE, SODIUM PHOSPHATE, DIBASIC, AND POTASSIUM PHOSPHATE 500 ML: .53; .5; .37; .037; .03; .012; .00082 INJECTION, SOLUTION INTRAVENOUS at 17:23

## 2025-06-30 RX ADMIN — ACETAMINOPHEN 975 MG: 325 TABLET ORAL at 15:41

## 2025-06-30 RX ADMIN — LEVETIRACETAM 500 MG: 500 TABLET, FILM COATED ORAL at 08:22

## 2025-06-30 NOTE — PLAN OF CARE
Problem: PHYSICAL THERAPY ADULT  Goal: Performs mobility at highest level of function for planned discharge setting.  See evaluation for individualized goals.  Description: Treatment/Interventions: Functional transfer training, LE strengthening/ROM, Therapeutic exercise, Endurance training, Cognitive reorientation, Patient/family training, Equipment eval/education, Bed mobility, Gait training, Continued evaluation, Spoke to nursing, Spoke to case management     See flowsheet documentation for full assessment, interventions and recommendations.  Outcome: Progressing  Note: Prognosis: Good  Problem List: Decreased strength, Decreased endurance, Impaired balance, Decreased mobility, Decreased cognition, Impaired judgement, Decreased safety awareness, Decreased skin integrity, Pain  Assessment: Pt seen for PT evaluation for mobility assessment & discharge needs. Pt admitted 6/29/2025 s/p fall resulting in SDH & SAH, lip laceration. During PT IE, pt completes bed mobility in hospital bed with S, transfers STS from EOB with RW and S, and ambulates 28ft with RW and S. During addt'l treatment time, pt noted with fatigue related worsening gait mechanics, however ambulates x3 additional trials - 18ft x1 (RW + DAVIE) + 76ft x2 (RW + S). Pt displays above outlined functional impairments & limitations, and presents below her baseline level of functional mobility. The AM-PAC & Barthel Index outcome tools were used to assist in determining pt safety w/ mobility/self care & appropriate d/c recommendations, see above for scores. Pt is at risk of falls d/t multiple comorbidities, h/o falls, impaired balance, impaired cognition, use of ambulatory aid, varying levels of pain , advanced age, acuity of medical illness, ongoing medical treatment of primary dx, orthostatic blood pressures, polypharmacy, and unstable vitals. Pt's clinical presentation is currently unstable/unpredictable as seen in pt's presentation of vital sign response,  changing level of pain, varying levels of cognitive performance, increased fall risk, new onset of impairment of functional mobility, decreased endurance, and new onset of weakness. Pt will benefit from continued PT services in order to address impairments, decrease risk of falls, maximize independence w/ fnxl mobility, & ensure safety w/ mobility for transition to next level of care. Based on pt presentation & impairments, pt would most appropriately benefit from Level III (minimal PT intensity) resources upon d/c.      Rehab Resource Intensity Level, PT: III (Minimum Resource Intensity)    See flowsheet documentation for full assessment.

## 2025-06-30 NOTE — ASSESSMENT & PLAN NOTE
Patient is A and O x 3.  Patient has increased risk for delirium due to baseline cognitive impairment, SAH/SDH, UTI on Bactrim, change in environment, pain    Plan  Continue delirium precautions  Maintain normal sleep/wake cycles  Adequate pain control  Avoid constipation and urinary retention  Encourage oral intake, assist with feeding if needed  Encourage ambulation and mobilization  Frequent reorientation and redirection  Encourage family and friends at bedside  Avoid medications that can precipitate delirium: Tramadol, antihistamines, anticholinergics, benzodiazepines

## 2025-06-30 NOTE — ASSESSMENT & PLAN NOTE
Patient presents with subdural hematoma and subarachnoid hematoma following a fall.  Neurosurgery is following    Plan  Continue to monitor neurochecks  Hold Xarelto, DVT prophylaxis  On Keppra for seizure prophylaxis for 7 days  PT/OT

## 2025-06-30 NOTE — OCCUPATIONAL THERAPY NOTE
Occupational Therapy Cancellation Note        Patient Name: Alice Rangel  Today's Date: 6/30/2025 06/30/25 1044   Note Type   Note type Cancelled Session   Cancel Reasons Medical status  (pending hand xray)   Additional Comments OT orders received, chart reviewed. Pt currently pending x ray of R hand to r/o fx following a fall. Will hold therapy pending results, Madonna Almazan AP notified.     Rhina Sheppard, OT

## 2025-06-30 NOTE — ASSESSMENT & PLAN NOTE
Started on Bactrim by primary team.  Monitor for side effects and renally adjust as needed  Encourage p.o. hydration  Out of bed as tolerated and monitor for urinary retention

## 2025-06-30 NOTE — CASE MANAGEMENT
Case Management Progress Note    Patient name Alice Rangel  Location S /S -01 MRN [id number]  : 1930 Date 2025       LOS (days): 1  Geometric Mean LOS (GMLOS) (days):   Days to GMLOS:        OBJECTIVE:        Current admission status: Inpatient  Preferred Pharmacy: No Pharmacies Listed  Primary Care Provider: Simi Elizabeth    Primary Insurance: MEDICARE  Secondary Insurance:     PROGRESS NOTE:  Pt is a resident of [facility]. CM called and left a message for [facility] office requesting a call back from the RN with information on the Pt's baseline. CM will continue to follow.

## 2025-06-30 NOTE — ASSESSMENT & PLAN NOTE
Recall 1/3.  Not able to draw the clock due to pain in right hand.  Requires assistant with all IADLs at baseline.  CT head with chronic microangiopathic changes.  Currently A and O x 3, calm and pleasant.  Vitamin B12 and TSH levels normal    Plan  Continue supportive care  Reorientation as needed  Delirium precautions  See plan for #2  Follow-up with geriatrics outpatient

## 2025-06-30 NOTE — OCCUPATIONAL THERAPY NOTE
Occupational Therapy Evaluation     Patient Name: Alice Rangel  Today's Date: 6/30/2025  Problem List  Principal Problem:    SDH (subdural hematoma) (HCC)  Active Problems:    Fall    Lip laceration    Skin tear of hand without complication    SAH (subarachnoid hemorrhage) (HCC)    Cognitive impairment    UTI (urinary tract infection)    At risk for delirium    Anemia    Past Medical History  Past Medical History[1]  Past Surgical History  Past Surgical History[2]        06/30/25 1452   OT Last Visit   OT Visit Date 06/30/25   Note Type   Note type Evaluation   Pain Assessment   Pain Assessment Tool 0-10   Pain Score No Pain   Restrictions/Precautions   Weight Bearing Precautions Per Order No   Other Precautions Chair Alarm;Bed Alarm;Fall Risk;Hard of hearing;Cognitive  (+orthostatic)   Home Living   Type of Home Assisted living  ([facility])   Home Layout One level;Access   Bathroom Accessibility Accessible via walker   Home Equipment Walker;Reacher   Prior Function   Level of [place] Independent with ADLs;Independent with functional mobility;Needs assistance with IADLS  (A for showering , (I) all other ADLs)   Lives With Facility staff   Receives Help From Family;Personal care attendant  (OT and PT at facility)   IADLs Family/Friend/Other provides transportation;Family/Friend/Other provides meals;Family/Friend/Other provides medication management  (managed by facility)   Falls in the last 6 months 1 to 4  (1 leading to admission, reports last fall prior was last fall/summer)   Vocational Retired   Comments walks to/from dining chaudhry mod (I) c RW 3x day   Lifestyle   Autonomy PTA pt is (I) c ADLs, A with IADLs, mod (I) c RW. Lives at [facility]. + falls.   Reciprocal Relationships daughter , staff,   Service to Others retired   Intrinsic Gratification crossword puzzles   General   Additional Pertinent History Pt admitted s/p fall resulting in SDH and SAH.  Complicated by UTI, cognitive  impairement, anemia.   Family/Caregiver Present No   Subjective   Subjective pt very appreciative of assistance, requesting assistance getting to bathroom   ADL   Where Assessed   (bathroom)   Eating Assistance 6  Modified independent   Grooming Assistance 4  Minimal Assistance   Grooming Deficit Verbal cueing;Supervision/safety;Increased time to complete;Setup  (standing at sink to wash hands with CGA steadying)   UB Bathing Assistance 4  Minimal Assistance   LB Bathing Assistance 3  Moderate Assistance   UB Dressing Assistance 4  Minimal Assistance   LB Dressing Assistance 3  Moderate Assistance   LB Dressing Deficit Thread RLE into underwear;Thread LLE into underwear;Increased time to complete;Supervision/safety;Requires assistive device for steadying;Setup;Steadying;Pull up over hips  (persistent Min A steadying in stance to pull clothing over hips; difficulty weight shifting to correct balance despite multimode cueing)   Toileting Assistance  3  Moderate Assistance   Toileting Deficit Clothing management up;Increased time to complete;Supervison/safety;Setup;Verbal cueing;Steadying  (initially on bedpan upon arrival, however mobilizes to bathroom for additional toileting session. Completes pericare on toilet with supervision. Min A steadying + A for clothing mgmt over hips)   Functional Assistance 4  Minimal Assistance   Additional Comments Limited by UE ROM deficits, balance, trunk control, functional reach   Bed Mobility   Supine to Sit 5  Supervision   Additional items Increased time required;Verbal cues  (flat bed)   Additional Comments on bed pan upon arrival; bridges hips (I) for removal.   Transfers   Sit to Stand 5  Supervision   Additional items Increased time required;Verbal cues   Stand to Sit 4  Minimal assistance   Additional items Increased time required;Verbal cues;Armrests  (cues for proximity)   Toilet transfer 4  Minimal assistance   Additional items Assist x 1;Increased time required;Verbal  cues;Standard toilet  (A for proximity and controlled descent + A for clearance from low surface. Use of GB)   Additional Comments RW used during transfers   Functional Mobility   Functional Mobility 4  Minimal assistance  (initially supervision regressing to Min A d/t fatigue)   Additional Comments increased A needed during directional changes in bathroom. household distances c RW. + BP drop during mobility.  102/45 post first trial, 84/42 post 2nd trial.     Additional items Rolling walker   Balance   Static Sitting Fair +   Dynamic Sitting Fair   Static Standing Poor +  (with UE x 1 vs no UE support. progresses to fair - with RW support)   Dynamic Standing Poor +   Activity Tolerance   Activity Tolerance Patient limited by fatigue   Medical Staff Made Aware Care coordination c PT Mariama. Madonna SMITH trauma. CM Movel   Nurse Made Aware RN Pat pre/post session   RUE Assessment   RUE Assessment WFL  (SF 60*, MMT 2+/5, pt reports baseline)   LUE Assessment   LUE Assessment WFL  (SF 75*, MMT 2+/5, pt reports baseline)   Hand Function   Gross Motor Coordination Impaired   Fine Motor Coordination Impaired   Sensation   Light Touch No apparent deficits   Vision-Basic Assessment   Patient Visual Report   (denies acute changes in vision following SAH/SDH)   Cognition   Overall Cognitive Status WFL  (cognition appropriate for age; appears at / near baseline cognition.  executive functioning deficits noted.)   Arousal/Participation Alert;Cooperative   Attention Within functional limits   Orientation Level Oriented to person;Oriented to place;Oriented to situation   Memory Within functional limits   Following Commands Follows multistep commands without difficulty   Comments Pt agreeable to OT session. Alert and cooperative, reports feeling very tired. Appropriate recall, memory, attention noted based on functional assessment. Fair safety awareness and insight , increased processing time   Assessment   Limitation Decreased ADL  status;Decreased Safe judgement during ADL;Decreased cognition;Decreased endurance;Decreased high-level ADLs;Decreased self-care trans;Decreased UE strength;Decreased UE ROM  (Trunk control, balance, functional reach, LE strength, executive functioning)   Prognosis Good   Assessment Patient is a 89 y.o. female seen for OT evaluation at [facility] following admission on 6/29/2025  s/p SDH (subdural hematoma) (HCC). Please see above for comprehensive list of comorbidities and significant PMHx impacting functional performance.  Upon initial evaluation, pt appears to be performing below baseline functional status.   Occupational performance is affected by the following deficits: endurance ,  decreased muscular strength , impaired coordination , decreased balance , decreased standing tolerance for self care tasks , decreased trunk control , and decreased activity tolerance . Personal/Environmental factors impacting D/C include: Assistance needed for ADLs and functional mobility. Supporting factors include: accessible home environment Patient would benefit from OT services within the acute care setting to maximize level of functional independence in the following areas self-care transfers, functional mobility, and ADLs.  From OT standpoint, recommendation at time of D/C would be Level 2: moderate resource intensity .   Goals   Patient Goals to walk into bathroom for toileting   Plan   Treatment Interventions ADL retraining;Functional transfer training;UE strengthening/ROM;Endurance training;Cognitive reorientation;Patient/family training;Equipment evaluation/education;Compensatory technique education;Activityengagement   Goal Expiration Date 07/10/25   OT Treatment Day 0   OT Frequency 3-5x/wk   Discharge Recommendation   Rehab Resource Intensity Level, OT II (Moderate Resource Intensity)   Additional Comments 2 The patient's raw score on the AM-PAC Daily Activity Inpatient Short Form is 16. A raw score of  less than 19 suggests the patient may benefit from discharge to post-acute rehabilitation services. Please refer to the recommendation of the Occupational Therapist for safe discharge planning.   AM-PAC Daily Activity Inpatient   Lower Body Dressing 2   Bathing 2   Toileting 2   Upper Body Dressing 3   Grooming 3   Eating 4   Daily Activity Raw Score 16   Daily Activity Standardized Score (Calc for Raw Score >=11) 35.96   AM-PAC Applied Cognition Inpatient   Following a Speech/Presentation 3   Understanding Ordinary Conversation 4   Taking Medications 3   Remembering Where Things Are Placed or Put Away 3   Remembering List of 4-5 Errands 3   Taking Care of Complicated Tasks 3   Applied Cognition Raw Score 19   Applied Cognition Standardized Score 39.77   End of Consult   Education Provided Yes   Patient Position at End of Consult Bedside chair  (PT present in room.)   Nurse Communication Nurse aware of consult   Goals established on initial evaluation in order to achieve pt's goal of walking to bathroom more.      Pt will complete UB ADLs Supervision  for increased ADL independence within 10 days.     Pt will complete LB ADLs Supervision  c  LHAE for increased ADL independence within 10 days.     Pt will complete toileting Supervision  with use of DME for increased ADL independence within 10 days.     Pt will demonstrate proper body mechanics to complete self-care transfers and functional mobility with Mod independent  and use of LRAD for increased safety and functional independence within 10 days.     Pt will demonstrate fair + standing balance for increased independence during ADL asks within 10 days.       Pt will demonstrate proper body mechanics and fall prevention strategies during 100% of tx sessions for increased safety awareness during ADL/IADLs    Pt will demonstrate activity tolerance of 30 min in therapeutic tasks for increased participation in meaningful activities upon D/C.      Pt will participate in  ongoing cognitive assessments to assist with safe D/C planning and supervision/assistance recommendations.     Pt will demonstrate OOB sitting tolerance of 2-4 hr/day for increased activity tolerance and engagement in leisure activities within 10 days.     Pt benefited from co-session of skilled OT and PT therapists in order to most appropriately address functional deficits d/t decreased activity tolerance.  OT/PT objectives were addressed separately; please see PT note for specific goal areas targeted.  Rhina Sheppard, OT           [1] No past medical history on file.  [2] No past surgical history on file.

## 2025-06-30 NOTE — PHYSICAL THERAPY NOTE
PHYSICAL THERAPY EVALUATION & TREATMENT  DATE: 06/30/25  TIME: 7562-4961    NAME:  Alice Rangel  AGE:   89 y.o.  Mrn:   [id number]  Length Of Stay: 1    ADMIT DX:  Multiple injuries [T07.XXXA]  SAH (subarachnoid hemorrhage) (HCC) [I60.9]    Past Medical History[1]  Past Surgical History[2]    Performed at least 2 patient identifiers during session: Name, Birthday, and ID bracelet     06/30/25 1451   PT Last Visit   PT Visit Date 06/30/25   Note Type   Note type Evaluation  (& treatment)   Pain Assessment   Pain Assessment Tool 0-10   Pain Score No Pain   Restrictions/Precautions   Weight Bearing Precautions Per Order No   Other Precautions Chair Alarm;Bed Alarm;Fall Risk;Pain;Hard of hearing;Cognitive  (+ orthostatic BP)   Home Living   Type of Home Assisted living  ([facility])   Home Layout Performs ADLs on one level;Able to live on main level with bedroom/bathroom;Elevator;Access   Bathroom Shower/Tub Walk-in shower   Bathroom Toilet Raised   Bathroom Equipment Grab bars in shower;Shower chair;Grab bars around toilet   Bathroom Accessibility Accessible via walker   Home Equipment Walker;Reacher;Grab bars   Prior Function   Level of [place] Independent with ADLs;Independent with functional mobility;Needs assistance with IADLS  (independent with basic ADLs, has assistance for showering, LISANDRO w/ RW for all mobility)   Lives With Facility staff   Receives Help From Personal care attendant   IADLs Family/Friend/Other provides transportation;Family/Friend/Other provides meals;Family/Friend/Other provides medication management   Falls in the last 6 months 1 to 4   Vocational Retired   Comments Pt reports that at baseline she has assistance from staff for showering and daily leg wrapping; is LISANDRO with RW for all ambulation, ambulates to dining room for meals 3x/day - reports it is a long walk but typically has no difficulty completing.   General   Additional Pertinent History Pt is a 89 yr old female  "admitted 6/29/25 s/p fall resulting in SAH and SDH, lip laceration. PMH includes: falls, Afib, pulmonary HTN, complete heart block with pacemaker.   Family/Caregiver Present No   Cognition   Overall Cognitive Status WFL   Arousal/Participation Cooperative   Attention Attends with cues to redirect   Orientation Level Oriented to person;Oriented to place;Oriented to situation   Memory Within functional limits   Following Commands Follows multistep commands with increased time or repetition   Subjective   Subjective \"I would love to walk more, I haven't been out of bed at all yet.\"   RUE Assessment   RUE Assessment WFL   LUE Assessment   LUE Assessment WFL   RLE Assessment   RLE Assessment WFL  (grossly 3+/5 MMT throughout)   LLE Assessment   LLE Assessment WFL  (grossly 3+/5 MMT throughout)   Vision-Basic Assessment   Patient Visual Report Other (Comment)  (denies acute visual changes with acute medical episode)   Coordination   Movements are Fluid and Coordinated 1   Sensation WFL   Light Touch   RLE Light Touch Grossly intact   LLE Light Touch Grossly intact   Bed Mobility   Supine to Sit 5  Supervision   Additional items Assist x 1;Bedrails;Increased time required;Verbal cues   Sit to Supine   (NT as pt was left seated OOB in recliner chair with alarm engaged at end of session)   Additional Comments Pt on bedpan upon therapist entry, is able to bridge hips up without assistance in order to remove bedpan. Increased time and effort for bed mobility in hospital bed however no physical assistance needed.   Transfers   Sit to Stand 5  Supervision   Additional items Assist x 1;Increased time required;Verbal cues  (RW; cues for hand placement for safety and optimal mechanics)   Stand to Sit 4  Minimal assistance   Additional items Assist x 1;Increased time required;Verbal cues  (RW; cues for hand placement for safe and controlled descent to surface)   Additional Comments Pt able to complete supported standing tasks with " close S, however when unsupported pt requires DAVIE due to instability.   Ambulation/Elevation   Gait pattern Narrow GILBERTO;Forward Flexion;Decreased foot clearance;Short stride;Excessively slow;Step to;Decreased hip extension;Decreased heel strike;Decreased toe off   Gait Assistance 5  Supervision   Additional items Assist x 1;Verbal cues   Assistive Device Rolling walker   Distance 28ft x1   Stair Management Assistance Not tested   Ambulation/Elevation Additional Comments See additional treatment time below for further/additional gait trials.   Balance   Static Sitting Fair +   Dynamic Sitting Fair   Static Standing Fair -  (w/ RW support)   Dynamic Standing Fair -  (w/ RW support)   Ambulatory Fair -  (w/ RW support)   Endurance Deficit   Endurance Deficit Yes   Activity Tolerance   Activity Tolerance Patient limited by fatigue   Medical Staff Made Aware Spoke with CM, OT, RN, trauma AP   Assessment   Prognosis Good   Problem List Decreased strength;Decreased endurance;Impaired balance;Decreased mobility;Decreased cognition;Impaired judgement;Decreased safety awareness;Decreased skin integrity;Pain   Assessment Pt seen for PT evaluation for mobility assessment & discharge needs. Pt admitted 6/29/2025 s/p fall resulting in SDH & SAH, lip laceration. During PT IE, pt completes bed mobility in hospital bed with S, transfers STS from EOB with RW and S, and ambulates 28ft with RW and S. During addt'l treatment time, pt noted with fatigue related worsening gait mechanics, however ambulates x3 additional trials - 18ft x1 (RW + DAVIE) + 76ft x2 (RW + S). Pt displays above outlined functional impairments & limitations, and presents below her baseline level of functional mobility. The AM-PAC & Barthel Index outcome tools were used to assist in determining pt safety w/ mobility/self care & appropriate d/c recommendations, see above for scores. Pt is at risk of falls d/t multiple comorbidities, h/o falls, impaired balance,  "impaired cognition, use of ambulatory aid, varying levels of pain , advanced age, acuity of medical illness, ongoing medical treatment of primary dx, orthostatic blood pressures, polypharmacy, and unstable vitals. Pt's clinical presentation is currently unstable/unpredictable as seen in pt's presentation of vital sign response, changing level of pain, varying levels of cognitive performance, increased fall risk, new onset of impairment of functional mobility, decreased endurance, and new onset of weakness. Pt will benefit from continued PT services in order to address impairments, decrease risk of falls, maximize independence w/ fnxl mobility, & ensure safety w/ mobility for transition to next level of care. Based on pt presentation & impairments, pt would most appropriately benefit from Level III (minimal PT intensity) resources upon d/c.   Goals   Patient Goals \"to walk more and feel stronger\"   [facility] Expiration Date 07/14/25   Short Term Goal #1 Pt will: complete all bed mobility independently in flat bed in order to promote increased OOB functional mobility and simulate home environment; complete all transfers with RW at LISANDRO level in order to increase safety with functional mobility; ambulate >150ft continuously with RW and S in order to increase safety with in-facility distance functional mobility; improve B LE strength to >/= 4/5 MMT t/o in order to increase safety with functional mobility and decrease risk of falls; demonstrate understanding and independence with LE strengthening HEP; improve unsupported standing balance to >/= fair+ grade in order to promote safety and increased independence with mobility; tolerate >3hrs OOB in upright position, in order to improve muscular endurance and respiratory status; improve AM-PAC score to >/= 23/24 in order to increase independence with mobility and decrease burden of care; improve Barthel Index score to >/= 80/100 in order to increase independence and decrease risk " of falls.   PT Treatment Day 1   Plan   Treatment/Interventions Functional transfer training;LE strengthening/ROM;Therapeutic exercise;Endurance training;Cognitive reorientation;Patient/family training;Equipment eval/education;Bed mobility;Gait training;Continued evaluation;Spoke to nursing;Spoke to case management   PT Frequency 2-3x/wk   Discharge Recommendation   Rehab Resource Intensity Level, PT III (Minimum Resource Intensity)   AM-PAC Basic Mobility Inpatient   Turning in Flat Bed Without Bedrails 4   Lying on Back to Sitting on Edge of Flat Bed Without Bedrails 3   Moving Bed to Chair 3   Standing Up From Chair Using Arms 3   Walk in Room 3   Climb 3-5 Stairs With Railing 2   Basic Mobility Inpatient Raw Score 18   Basic Mobility Standardized Score 41.05   [facility] Highest Level Of Mobility   -HLM Goal 6: Walk 10 steps or more   -HLM Achieved 7: Walk 25 feet or more   Modified Adriel Scale   Modified [place] Scale 3   Barthel Index   Feeding 10   Bathing 0   Grooming Score 5   Dressing Score 5   Bladder Score 10   Bowels Score 10   Toilet Use Score 5   Transfers (Bed/Chair) Score 10   Mobility (Level Surface) Score 10   Stairs Score 5   Barthel Index Score 70   Additional Treatment Session   Start Time 1515   End Time 1540   Treatment Assessment Pt is agreeable to participate in additional gait training post IE. Pt noted with fatigue related worsening gait mechanics during ambulation trial in initial evaluation, however is agreeable and eager for additional ambulation trials. Pt continues to complete all functional transfers with RW and S, then ambulates x3 additional trials - 18ft x1 (RW + DAVIE) + 76ft x2 (RW + S; functional seated rest break half way due to generalized fatigue). Pt denies any abnormal symptoms aside from increased fatigue with mobility, however pt noted to be orthostatic post ambulation trial, improves with reclined position and functional seated rest. At end of session pt was left  in recliner chair with alarm engaged and needs in reach, RN and trauma AP aware of pt status. Regarding functional mobility, pt remains below her baseline level of functional mobility however displays functional capacity for return to [facility] with increased supports. Continue to recommend Level III (minimal PT intensity) resources once medically cleared for d/c from the acute care setting. Will continue skilled PT POC as able and appropriate to address functional impairments and progress towards therapy goals. Next session, plan for intervention of increased mobility/gait as able.   Equipment Use Ax1, RW   Additional Treatment Day 1   End of Consult   Patient Position at End of Consult Bedside chair;Bed/Chair alarm activated;All needs within reach     This session, pt required and most appropriately benefited from partial or full skilled PT/OT co-eval due to significant regression from baseline level of mobility, continuous vitals monitoring, decreased activity tolerance, and unpredictable medical and/or functional status. PT and OT goals were addressed separately as seen in documentation.    Based on patient's [facility] Highest Level of Mobility scores today, patient currently has a goal of [facility] Levels: 7: WALK 25 FEET OR MORE, to be completed with RN staffing each shift, in order to improve overall activity tolerance and mobility, combat hospital related deconditioning, and maximize outcomes for d/c from the acute care setting.     The patient's AM-PAC Basic Mobility Inpatient Short Form Raw Score is 18. A Raw score of greater than 16 suggests the patient may benefit from discharge to home. Please also refer to the recommendation of the Physical Therapist for safe discharge planning.      Radha Moody, PT, DPT   Available via [employer]  NPI # [id number]  [place] License - [id number]  6/30/2025          [1] No past medical history on file.  [2] No past surgical history on file.

## 2025-06-30 NOTE — ASSESSMENT & PLAN NOTE
- Neuro exam: GCS 15, non-focal  - Continue neurologic checks: Every 4 hours.  - Reversal agent administered: K-Centra  - CT scan of the head on 6/29 reviewed: tiny interhemispheric SDH, tiny left paramedian frontal SAH  - Repeat CT scan of the head in the evening of 6/29 with stable SAH/SDH   - Appreciate Neurosurgery evaluation and recommendations.  - Complete 7 day course of Keppra for seizure prophylaxis  - Chemical DVT prophylaxis: Lovenox  - Hold all anticoagulants and anti platelet medications for 2 weeks and/or until cleared by Neurosurgery to resume.  - PT and OT (including cognitive evaluation) evaluation and treatment as indicated.

## 2025-06-30 NOTE — ASSESSMENT & PLAN NOTE
- Right dorsal hand skin tear x 2   - Local wound care  - Ace wrap for compression and hemostasis  - Follow up wound care center

## 2025-06-30 NOTE — PLAN OF CARE
Problem: OCCUPATIONAL THERAPY ADULT  Goal: Performs self-care activities at highest level of function for planned discharge setting.  See evaluation for individualized goals.  Description: Treatment Interventions: ADL retraining, Functional transfer training, UE strengthening/ROM, Endurance training, Cognitive reorientation, Patient/family training, Equipment evaluation/education, Compensatory technique education, Activityengagement          See flowsheet documentation for full assessment, interventions and recommendations.   Note: Limitation: Decreased ADL status, Decreased Safe judgement during ADL, Decreased cognition, Decreased endurance, Decreased high-level ADLs, Decreased self-care trans, Decreased UE strength, Decreased UE ROM (Trunk control, balance, functional reach, LE strength, executive functioning)  Prognosis: Good  Assessment: Patient is a 89 y.o. female seen for OT evaluation at [facility] following admission on 6/29/2025  s/p SDH (subdural hematoma) (HCC). Please see above for comprehensive list of comorbidities and significant PMHx impacting functional performance.  Upon initial evaluation, pt appears to be performing below baseline functional status.   Occupational performance is affected by the following deficits: endurance ,  decreased muscular strength , impaired coordination , decreased balance , decreased standing tolerance for self care tasks , decreased trunk control , and decreased activity tolerance . Personal/Environmental factors impacting D/C include: Assistance needed for ADLs and functional mobility. Supporting factors include: accessible home environment Patient would benefit from OT services within the acute care setting to maximize level of functional independence in the following areas self-care transfers, functional mobility, and ADLs.  From OT standpoint, recommendation at time of D/C would be Level 2: moderate resource intensity .     Rehab Resource Intensity  Level, OT: II (Moderate Resource Intensity)

## 2025-06-30 NOTE — TELEPHONE ENCOUNTER
INPATIENT: 6/29/25 - 7/3/25    2 WK HFU - AP SOLO  7/17/25 / 9:00 / CORRINA    IMAGING: CT HEAD (LAST 6/29/25)    PER  BILLY Crenshaw; Ana Mcwilliams  Arrange 2-week follow-up with ANGI with repeat CT head.  Thank you.

## 2025-06-30 NOTE — CASE MANAGEMENT
Case Management Assessment & Discharge Planning Note    Patient name Alice Rangel  Location S /S -01 MRN [id number]  : 1930 Date 2025       Current Admission Date: 2025  Current Admission Diagnosis:SDH (subdural hematoma) (HCC)   Patient Active Problem List    Diagnosis Date Noted    Fall 2025    Lip laceration 2025    Skin tear of hand without complication 2025    SAH (subarachnoid hemorrhage) (HCC) 2025    SDH (subdural hematoma) (HCC) 2025      LOS (days): 1  Geometric Mean LOS (GMLOS) (days):   Days to GMLOS:     OBJECTIVE:    Risk of Unplanned Readmission Score: 8.45         Current admission status: Inpatient       Preferred Pharmacy: No Pharmacies Listed  Primary Care Provider: Simi Elizabeth    Primary Insurance: MEDICARE  Secondary Insurance:     ASSESSMENT:  Active Health Care Proxies       Juan Carlos Dandre  First Alternate Health Care Agent - Son   Primary Phone: [phone] (Mobile)                           Readmission Root Cause  30 Day Readmission: No    Patient Information  Admitted from:: Facility ([facility]  Mental Status: Alert  During Assessment patient was accompanied by: Not accompanied during assessment  Assessment information provided by:: Other - please comment (Melinda Rivero RN Deandra)  Primary Caregiver: Other (Comment)  Caregiver's Name:: [facility]  Caregiver's Relationship to Patient:: Facility Staff  Caregiver's Telephone Number:: [phone]  Support Systems: Son, Family members  County of Residence: [place]  What city do you live in?: [place]  Home entry access options. Select all that apply.: No steps to enter home  Type of Current Residence: Facility  Upon entering residence, is there a bedroom on the main floor (no further steps)?: Yes  Upon entering residence, is there a bathroom on the main floor (no further steps)?: Yes  Living Arrangements: Lives in Facility  Is patient a  ?: No    Activities of Daily Living Prior to Admission  Functional Status: Independent  Completes ADLs independently?: Yes (Requires assistance with showers and ce stockings)  Ambulates independently?: Yes  Does patient use assisted devices?: Yes  Assisted Devices (DME) used: Walker  Does patient currently own DME?: Yes  What DME does the patient currently own?: Walker  Does patient have a history of Outpatient Therapy (PT/OT)?: No  Does the patient have a history of Short-Term Rehab?: No  Does patient have a history of HHC?: Yes  Does patient currently have HHC?: No         Patient Information Continued  Income Source: Pension/correction  Does patient have prescription coverage?: Yes  Can the patient afford their medications and any related supplies (such as glucometers or test strips)?: N/A  Does patient receive dialysis treatments?: No  Does patient have a history of substance abuse?: No  Does patient have a history of Mental Health Diagnosis?: No         Means of Transportation  Means of Transport to App:: Other (Comment)          DISCHARGE DETAILS:    Discharge planning discussed with:: Deandra-EAN Rivero RN  Freedom of Choice: Yes  Comments - Freedom of Choice: Deandra provided the information for the Pt's assessment. Deandra reported the Pt is from their [facility].  EAN contacted family/caregiver?: Yes             Contacts  Patient Contacts: Deandra  Relationship to Patient:: Other (Comment)  Contact Method: Phone  Phone Number: [phone]  Reason/Outcome: Discharge Planning, Continuity of Care    Requested Home Health Care         Is the patient interested in HHC at discharge?: No    DME Referral Provided  Referral made for DME?: No    Other Referral/Resources/Interventions Provided:  Interventions:  (TBD)         Treatment Team Recommendation:  (TBD)       Additional Comments: Deandra is requesting the Pt's therapy notes upon completion whether the Pt is recommended for SNF or  return to their facility.

## 2025-06-30 NOTE — ASSESSMENT & PLAN NOTE
Status post fall.  Patient was on Xarelto at home  She was seen by neurosurgery.  Will continue to monitor neurochecks  Continue to hold anticoagulation use SCD for DVT prophylaxis  Continue Keppra for seizure prophylaxis for 7 days  PT/OT as tolerated

## 2025-06-30 NOTE — ASSESSMENT & PLAN NOTE
- Right dorsal hand skin tear x 2   - Local wound care  - Ace wrap for compression with adequate hemostasis   - continue local wound care with dressing change every 4 days.

## 2025-06-30 NOTE — ASSESSMENT & PLAN NOTE
- Upper buccal lip laceration with active bleeding  - Improved with Surgicel and ice and chromic suture  - Continue local wound care

## 2025-06-30 NOTE — ASSESSMENT & PLAN NOTE
Continue local care  Pain management as per primary team  Apply ice as tolerated  X-ray right hand ruled out fracture

## 2025-06-30 NOTE — PROGRESS NOTES
Progress Note - Trauma   Name: Alice Rangel 89 y.o. female I MRN: [id number]  Unit/Bed#: S -01 I Date of Admission: 6/29/2025   Date of Service: 6/30/2025 I Hospital Day: 1    Assessment & Plan  Fall  - Status post fall with the below noted injuries.  - Fall precautions.    SDH (subdural hematoma) (HCC)  SAH (subarachnoid hemorrhage) (HCC)  - Neuro exam: GCS 15, non-focal  - Continue neurologic checks: Every 4 hours.  - Reversal agent administered: K-Centra  - CT scan of the head on 6/29 reviewed: tiny interhemispheric SDH, tiny left paramedian frontal SAH  - Repeat CT scan of the head in the evening of 6/29 with stable SAH/SDH   - Appreciate Neurosurgery evaluation and recommendations.  - Complete 7 day course of Keppra for seizure prophylaxis  - Chemical DVT prophylaxis: Lovenox  - Hold all anticoagulants and anti platelet medications for 2 weeks and/or until cleared by Neurosurgery to resume.  - PT and OT (including cognitive evaluation) evaluation and treatment as indicated.  Lip laceration  - Upper buccal lip laceration with active bleeding  - Improved with Surgicel and ice and chromic suture  - Continue local wound care  Skin tear of hand without complication  - Right dorsal hand skin tear x 2   - Local wound care  - Ace wrap for compression with adequate hemostasis   - continue local wound care with dressing change every 4 days.   Urinary tract infection  - present on admission  - treat with three day course of Bactrim due to PCN allergy    Bowel Regimen: Senokot, Miralax  VTE Prophylaxis:VTE covered by:  enoxaparin, Subcutaneous        Disposition: Continue med/surg status pending placement Please contact the SecureChat role for the Trauma service with any questions/concerns.    24 Hour Events : No overnight events  Subjective : patient resting comfortably, feels the swelling in her lip has decreased. Is tolerating her diet. Has no complaints.     Objective :  Temp:  [97.7 °F (36.5 °C)-98.6 °F (37 °C)]  97.9 °F (36.6 °C)  HR:  [60-80] 61  BP: ()/(39-70) 98/40  Resp:  [15-19] 19  SpO2:  [93 %-97 %] 96 %    I/O         06/29 0701  06/30 0700 06/30 0701 07/01 0700    P.O.  480    Total Intake(mL/kg)  480 (5.7)    Urine (mL/kg/hr) 900 (0.4) 250 (0.2)    Total Output 900 250    Net -900 +230          Unmeasured Urine Occurrence 1 x           Gen: No acute distress resting comfortably in bed  Neuro: AAOx3, GCS 15, no focal neurodeficit  HEENT: PERRLA, EOMI, upper lip swelling and abrasions, no active bleeding  Cards: RRR, S1, S2 without murmur rub or gallop  Pulm: Clear to auscultation bilaterally without wheezes rales or rhonchi  GI: Soft, nontender, nondistended  : Voiding independently  MSK: Extremities nontender without deformity  Skin: Warm, dry, intact           Lab Results: I have reviewed the following results:  Recent Labs     06/29/25  0445 06/29/25  0638 06/30/25  0512   WBC  --    < > 7.58   HGB 10.9*   < > 8.5*   HCT 32*   < > 26.3*   PLT  --    < > 239   SODIUM  --    < > 136   K  --    < > 4.3   CL  --    < > 106   CO2 26   < > 25   BUN  --    < > 38*   CREATININE  --    < > 1.11   GLUC  --    < > 94   CAIONIZED 1.20  --   --     < > = values in this interval not displayed.

## 2025-06-30 NOTE — ASSESSMENT & PLAN NOTE
- Neuro exam: GCS 15, non-focal  - Continue neurologic checks: Every 1 hours.  - Reversal agent administered: K-Centra  - CT scan of the head on 6/29 reviewed: tiny interhemispheric SDH, tiny left paramedian frontal SAH  - Repeat CT scan of the head in the evening of 6/29 with slightly worse or the same SAH/SDH pending formal read.   - Appreciate Neurosurgery evaluation and recommendations.  - Complete 7 day course of Keppra for seizure prophylaxis  - Chemical DVT prophylaxis: Not cleared for chemical prophylaxis by neurosurgery at this time. Continue SCDs bilaterally.   - Hold all anticoagulants and anti platelet medications for 2 weeks and/or until cleared by Neurosurgery to resume.  - PT and OT (including cognitive evaluation) evaluation and treatment as indicated.

## 2025-06-30 NOTE — CONSULTS
Consultation - Geriatric Medicine   Alice Rangel 89 y.o. female MRN: [id number]  Unit/Bed#: S -01 Encounter: [id number]      Assessment & Plan     Anemia  Assessment & Plan  Hemoglobin 8.5 today  Continue to monitor CBC and transfuse if hemoglobin less than 7.0    At risk for delirium  Assessment & Plan  - Alert oriented x 3 at the time of encounter and able to recite days of the week backwards  -Patient is high risk of delirium due to cognitive impairment at baseline, pain, change in environment, UTI  -Initiate delirium precautions  -maintain normal sleep/wake cycle  -minimize overnight interruptions, group overnight vitals/labs/nursing checks as possible  -dim lights, close blinds and turn off tv to minimize stimulation and encourage sleep environment in evenings  -ensure that pain is well controlled   -monitor for fecal and urinary retention which may precipitate delirium  -encourage early mobilization and ambulation  -provide frequent reorientation and redirection  -encourage family and friends at the bedside to help calm patient if anxious  -avoid medications which may precipitate or worsen delirium such as tramadol, benzodiazepine, anticholinergics, and antihistaminics  -encourage hydration and nutrition , assist with feeding if needed  -redirect unwanted behaviors as first line, avoid physical restraints.     UTI (urinary tract infection)  Assessment & Plan  Started on Bactrim by primary team.  Monitor for side effects and renally adjust as needed  Encourage p.o. hydration  Out of bed as tolerated and monitor for urinary retention    Cognitive impairment  Assessment & Plan  Currently stable, no behaviors noted.  Recall 1/3.  Not able to draw the clock due to pain in right hand  She needs assistance with all IADLs at baseline.  CT head shows chronic microangiopathic changes  Will continue to provide supportive care, reorient as needed.  Patient is at high risk for delirium, will monitor closely and place  on delirium precautions.  Maintain sleep/wake cycle.  Optimize pain regimen.  Monitor for constipation and urinary retention and manage as needed.  Check B12 level and TSH  Encourage family to visit.  Encourage to wear glasses and hearing aids while awake.  Encourage po intake, assist with feeding if needed.   Follow-up with geriatrics as outpatient    SAH (subarachnoid hemorrhage) (HCC)  Assessment & Plan  Status post fall.  Patient was on Xarelto at home  She was seen by neurosurgery.  Will continue to monitor neurochecks  Continue to hold anticoagulation use SCD for DVT prophylaxis  Continue Keppra for seizure prophylaxis for 7 days  PT/OT as tolerated    Skin tear of hand without complication  Assessment & Plan  Continue local care  Pain management as per primary team  Apply ice as tolerated  X-ray right hand ruled out fracture    Lip laceration  Assessment & Plan  Pain/discomfort improved  States that she was able to eat better today    Fall  Assessment & Plan  Patient uses a walker for ambulation at baseline  she reports recent fall  Associated symptoms   Monitor orthostatic vital signs  Encourage p.o. hydration  Avoid hypotension and hypoglycemia   PT/OT as tolerated    * SDH (subdural hematoma) (HCC)  Assessment & Plan  Status post fall.  Patient was on Xarelto at home  She was seen by neurosurgery.  Will continue to monitor neurochecks  Continue to hold anticoagulation use SCD for DVT prophylaxis  Continue Keppra for seizure prophylaxis for 7 days  PT/OT as tolerated              History of Present Illness   Physician Requesting Consult: Enrique Ferris,*  Reason for Consult / Principal Problem: fall        HPI: Alice Rangel is a 89 y.o. year old female who presented to hospital status post fall.  Patient lives at [facility] and uses a walker for ambulation.  On admission she was found to have subdural hematoma, subarachnoid hemorrhage, hip laceration and skin tear of right hand.  At time of  encounter she was alert oriented x 3.  Denies dizziness headache, any focal deficits, tingling numbness.  No acute changes in vision, she wears glasses at baseline.  Her appetite is improved.  Denies nausea or difficulty swallowing.  No abdominal pain.  No bowel movement since admission to the hospital.  She reports nocturia at baseline denies dysuria or hematuria.  No chest pain, shortness of breath or cough.  At baseline she needs assistance with all IADLs and some ADLs.  She uses a walker for ambulation and she reports a previous fall about 6 months ago.    Inpatient consult to Gerontology  Consult performed by: Marge Ardon MD  Consult ordered by: Madonna Bob PA-C          Review of Systems   Constitutional:  Negative for chills and fever.   HENT:  Negative for congestion and rhinorrhea.    Eyes:  Positive for visual disturbance.   Respiratory:  Negative for cough, shortness of breath and wheezing.    Cardiovascular:  Negative for chest pain, palpitations and leg swelling.   Gastrointestinal:  Negative for abdominal pain and constipation.   Endocrine: Negative for cold intolerance.   Genitourinary:  Negative for difficulty urinating, dysuria and hematuria.        Nocturia   Musculoskeletal:  Positive for arthralgias and gait problem.   Skin:  Positive for wound.   Allergic/Immunologic: Negative for environmental allergies.   Neurological:  Negative for dizziness and seizures.   Hematological:  Does not bruise/bleed easily.   Psychiatric/Behavioral:  Negative for behavioral problems and sleep disturbance.            Historical Information   Past Medical History[1]  Past Surgical History[2]  Social History   Social History     Substance and Sexual Activity   Alcohol Use Not Currently     Social History     Substance and Sexual Activity   Drug Use Not on file     Tobacco Use History[3]  Family History: Family History[4]    Meds/Allergies   current meds:   Current Facility-Administered Medications:      acetaminophen (TYLENOL) tablet 975 mg, Q8H    amiodarone tablet 100 mg, Daily    carvedilol (COREG) tablet 25 mg, BID With Meals    levETIRAcetam (KEPPRA) tablet 500 mg, BID    sulfamethoxazole-trimethoprim (BACTRIM DS) 800-160 mg per tablet 1 tablet, Q12H KAYLEIGH    Allergies[5]    Objective     Intake/Output Summary (Last 24 hours) at 6/30/2025 1514  Last data filed at 6/30/2025 1201  Gross per 24 hour   Intake 480 ml   Output 1000 ml   Net -520 ml     Invasive Devices       Peripheral Intravenous Line  Duration             Peripheral IV 06/29/25 Left;Proximal;Ventral (anterior) Antecubital 1 day                    Physical Exam  Vitals and nursing note reviewed.   Constitutional:       General: She is not in acute distress.     Appearance: She is well-developed.      Comments: Frail looking   HENT:      Head: Normocephalic.      Comments: Bruise face, scalp hematoma     Mouth/Throat:      Mouth: Mucous membranes are moist.     Eyes:      Conjunctiva/sclera: Conjunctivae normal.       Cardiovascular:      Rate and Rhythm: Normal rate and regular rhythm.      Heart sounds: Heart sounds are distant. No murmur heard.     Comments: Pacemaker in place  Pulmonary:      Effort: Pulmonary effort is normal. No respiratory distress.      Breath sounds: Normal breath sounds.   Abdominal:      Palpations: Abdomen is soft.      Tenderness: There is no abdominal tenderness.     Musculoskeletal:         General: No swelling.      Cervical back: Neck supple.      Right lower leg: Edema present.      Left lower leg: Edema present.     Skin:     General: Skin is warm and dry.      Capillary Refill: Capillary refill takes less than 2 seconds.      Findings: Bruising present.      Comments: R wrist wrapped, tender     Neurological:      Mental Status: She is alert and oriented to person, place, and time.     Psychiatric:         Mood and Affect: Mood normal.         Lab Results:   I have personally reviewed pertinent lab results  including the following:  - [facility] CMP    I have personally reviewed the following imaging study reports in PACS:  - CT head      Therapies:   PT: eval pending    VTE Prophylaxis: VTE covered by:    None        Code Status: Level 3 - DNAR and DNI  Advance Directive and Living Will:      Power of :    POLST:      Family and Social Support:   Living Arrangements: Lives in Facility  Support Systems: Son; Family members  Assistance Needed: none  Type of Current Residence: Facility  [facility]  Current Home Care Services: No  Discharge planning discussed with:: Mulu Rivero RN  [place] of Choice: Yes      Goals of Care: DNR    Thank you for allowing me to participate in your patients' care. Please do not hesitate to call with any additional questions.  Marge Ardon MD          [1] No past medical history on file.  [2] No past surgical history on file.  [3]   Social History  Tobacco Use   Smoking Status Never   Smokeless Tobacco Never   [4] No family history on file.  [5]   Allergies  Allergen Reactions    Penicillins Hives

## 2025-06-30 NOTE — CASE MANAGEMENT
Case Management Discharge Planning Note    Patient name Alice JACOBS /S -01 MRN [id number]  : 1930 Date 2025       Current Admission Date: 2025  Current Admission Diagnosis:SDH (subdural hematoma) (HCC)   Patient Active Problem List    Diagnosis Date Noted    Cognitive impairment 2025    Urinary tract infection 2025    At risk for delirium 2025    Anemia 2025    Fall 2025    Lip laceration 2025    Skin tear of hand without complication 2025    SAH (subarachnoid hemorrhage) (HCC) 2025    SDH (subdural hematoma) (HCC) 2025      LOS (days): 1  Geometric Mean LOS (GMLOS) (days): 1.9  Days to GMLOS:0.4     OBJECTIVE:  Risk of Unplanned Readmission Score: 8.97         Current admission status: Inpatient   Preferred Pharmacy: No Pharmacies Listed  Primary Care Provider: [facility]    Primary Insurance: MEDICARE  Secondary Insurance:     DISCHARGE DETAILS:    Discharge planning discussed with:: Emelina, Dandre & Shi-daughter-n-law     Comments - Freedom of Choice: EAN met with Pt and Kayy to discuss the recommendation of SNF which the Pt reported being agreeable to and gave permission for a blanket referral.     Were Treatment Team discharge recommendations reviewed with patient/caregiver?: Yes  Did patient/caregiver verbalize understanding of patient care needs?: Yes  Were patient/caregiver advised of the risks associated with not following Treatment Team discharge recommendations?: Yes    Contacts  Patient Contacts: Kayy Amos  Relationship to Patient:: Family  Contact Method: In Person  Reason/Outcome: Referral         Other Referral/Resources/Interventions Provided:  Interventions: Short Term Rehab  Referral Comments: CM made a blanket SNF referral.      Treatment Team Recommendation: Short Term Rehab  Expected Discharge Disposition: Skilled Nursing Facility          Additional Comments: Pt's therapy notes were  faxed to Deandra at [facility] per her request.

## 2025-07-01 LAB
MRSA NOSE QL CULT: NORMAL
MRSA NOSE QL CULT: NORMAL

## 2025-07-01 PROCEDURE — NC001 PR NO CHARGE: Performed by: SURGERY

## 2025-07-01 PROCEDURE — 99232 SBSQ HOSP IP/OBS MODERATE 35: CPT | Performed by: PHYSICIAN ASSISTANT

## 2025-07-01 PROCEDURE — 99232 SBSQ HOSP IP/OBS MODERATE 35: CPT | Performed by: FAMILY MEDICINE

## 2025-07-01 RX ORDER — SULFAMETHOXAZOLE AND TRIMETHOPRIM 800; 160 MG/1; MG/1
1 TABLET ORAL EVERY 12 HOURS SCHEDULED
Qty: 3 TABLET | Refills: 0 | Status: SHIPPED | OUTPATIENT
Start: 2025-07-01 | End: 2025-07-03

## 2025-07-01 RX ORDER — SENNOSIDES 8.6 MG
2 TABLET ORAL
Status: DISCONTINUED | OUTPATIENT
Start: 2025-07-01 | End: 2025-07-04 | Stop reason: HOSPADM

## 2025-07-01 RX ORDER — ACETAMINOPHEN 325 MG/1
975 TABLET ORAL EVERY 8 HOURS PRN
Start: 2025-07-01 | End: 2025-07-08

## 2025-07-01 RX ORDER — LEVETIRACETAM 500 MG/1
500 TABLET ORAL 2 TIMES DAILY
Start: 2025-07-01 | End: 2025-07-06

## 2025-07-01 RX ADMIN — SULFAMETHOXAZOLE AND TRIMETHOPRIM 1 TABLET: 800; 160 TABLET ORAL at 08:15

## 2025-07-01 RX ADMIN — ACETAMINOPHEN 975 MG: 325 TABLET ORAL at 16:52

## 2025-07-01 RX ADMIN — SENNOSIDES 17.2 MG: 8.6 TABLET, FILM COATED ORAL at 20:55

## 2025-07-01 RX ADMIN — LEVETIRACETAM 500 MG: 500 TABLET, FILM COATED ORAL at 16:52

## 2025-07-01 RX ADMIN — ENOXAPARIN SODIUM 30 MG: 30 INJECTION SUBCUTANEOUS at 20:55

## 2025-07-01 RX ADMIN — ACETAMINOPHEN 975 MG: 325 TABLET ORAL at 08:15

## 2025-07-01 RX ADMIN — ACETAMINOPHEN 975 MG: 325 TABLET ORAL at 02:26

## 2025-07-01 RX ADMIN — ENOXAPARIN SODIUM 30 MG: 30 INJECTION SUBCUTANEOUS at 08:15

## 2025-07-01 RX ADMIN — LEVETIRACETAM 500 MG: 500 TABLET, FILM COATED ORAL at 08:15

## 2025-07-01 RX ADMIN — POLYETHYLENE GLYCOL 3350 17 G: 17 POWDER, FOR SOLUTION ORAL at 08:15

## 2025-07-01 RX ADMIN — CARVEDILOL 25 MG: 12.5 TABLET, FILM COATED ORAL at 16:52

## 2025-07-01 RX ADMIN — SULFAMETHOXAZOLE AND TRIMETHOPRIM 1 TABLET: 800; 160 TABLET ORAL at 20:55

## 2025-07-01 RX ADMIN — AMIODARONE HYDROCHLORIDE 100 MG: 200 TABLET ORAL at 08:17

## 2025-07-01 NOTE — PROGRESS NOTES
Progress Note - Geriatric Medicine   Name: Alice Rangel 89 y.o. female I MRN: [id number]  Unit/Bed#: S -01 I Date of Admission: 6/29/2025   Date of Service: 7/1/2025 I Hospital Day: 2     Assessment & Plan  SDH (subdural hematoma) (HCC)  Patient presents with subdural hematoma and subarachnoid hematoma following a fall.  Neurosurgery is following    Plan  Continue to monitor neurochecks  Hold Xarelto, DVT prophylaxis  On Keppra for seizure prophylaxis for 7 days  PT/OT  At risk for delirium  Patient is A and O x 3.  Patient has increased risk for delirium due to baseline cognitive impairment, SAH/SDH, UTI on Bactrim, change in environment, pain    Plan  Continue delirium precautions  Maintain normal sleep/wake cycles  Adequate pain control  Avoid constipation and urinary retention  Encourage oral intake, assist with feeding if needed  Encourage ambulation and mobilization  Frequent reorientation and redirection  Encourage family and friends at bedside  Avoid medications that can precipitate delirium: Tramadol, antihistamines, anticholinergics, benzodiazepines    Cognitive impairment  Recall 1/3.  Not able to draw the clock due to pain in right hand.  Requires assistant with all IADLs at baseline.  CT head with chronic microangiopathic changes.  Currently A and O x 3, calm and pleasant.  Vitamin B12 and TSH levels normal    Plan  Continue supportive care  Reorientation as needed  Delirium precautions  See plan for #2  Follow-up with geriatrics outpatient  Fall  Patient presents with a fall and head strike.  Uses a walker for ambulation at baseline.    Plan  Encourage oral hydration  Fall precautions  PT/OT  Avoid hypotension and hypoglycemia    Lip laceration  Swelling is gradually improving.  Able to eat and drink.  No much pain    Skin tear of hand without complication  Continue local care  Pain management as per primary team  Apply ice as tolerated  X-ray right hand ruled out fracture  Urinary tract  "infection  Started on Bactrim by primary team.  Monitor for side effects and renally adjust as needed  Encourage p.o. hydration  Out of bed as tolerated and monitor for urinary retention  Anemia  Hemoglobin around 8.  Continue to monitor  Transfuse as needed    Subjective: Patient was seen and examined at bedside.  Patient mentioned that she feels much better today.  Experiencing 4/10 pain in left upper arm.  But adequately controlled with Tylenol.  No other pains or headache.  Having regular bowel movements.  No urinary retention.  Maintaining adequate oral intake.  A and O x 3.  Answering all the questions appropriately.    Review of Systems   Constitutional:  Negative for chills and fever.   HENT:  Negative for ear pain and sore throat.    Eyes:  Negative for pain and visual disturbance.   Respiratory:  Negative for cough and shortness of breath.    Cardiovascular:  Negative for chest pain and palpitations.   Gastrointestinal:  Negative for abdominal pain and vomiting.   Genitourinary:  Negative for dysuria and hematuria.   Musculoskeletal:  Positive for myalgias (R arm). Negative for arthralgias and back pain.   Skin:  Negative for color change and rash.   Neurological:  Negative for seizures and syncope.   All other systems reviewed and are negative.        Objective:     Vitals: Blood pressure (!) 98/43, pulse 73, temperature 98.1 °F (36.7 °C), resp. rate 18, height 5' 5\" (1.651 m), weight 84.3 kg (185 lb 13.6 oz), SpO2 (!) 89%.,Body mass index is 30.93 kg/m².      Intake/Output Summary (Last 24 hours) at 7/1/2025 1416  Last data filed at 7/1/2025 0844  Gross per 24 hour   Intake 240 ml   Output 475 ml   Net -235 ml       Current Medications: Reviewed    Physical Exam:   Physical Exam  Vitals and nursing note reviewed.   Constitutional:       General: She is not in acute distress.     Appearance: She is well-developed.   HENT:      Head: Normocephalic and atraumatic.      Comments: Swelling and dried blood on " and above upper lip     Mouth/Throat:      Mouth: Mucous membranes are moist.      Pharynx: Oropharynx is clear.     Eyes:      Conjunctiva/sclera: Conjunctivae normal.       Cardiovascular:      Rate and Rhythm: Normal rate and regular rhythm.      Heart sounds: No murmur heard.  Pulmonary:      Effort: Pulmonary effort is normal. No respiratory distress.      Breath sounds: Normal breath sounds.   Abdominal:      Palpations: Abdomen is soft.      Tenderness: There is no abdominal tenderness.     Musculoskeletal:         General: No swelling.      Cervical back: Neck supple.      Comments: Swelling in right upper arm     Skin:     General: Skin is warm and dry.      Capillary Refill: Capillary refill takes less than 2 seconds.     Neurological:      Mental Status: She is alert.     Psychiatric:         Mood and Affect: Mood normal.          Invasive Devices       Peripheral Intravenous Line  Duration             Peripheral IV 06/29/25 Left;Proximal;Ventral (anterior) Antecubital 2 days                    Lab, Imaging and other studies: Results Review Statement: No pertinent imaging studies reviewed.

## 2025-07-01 NOTE — INCIDENTAL FINDINGS
"The following findings require follow up:  Radiographic finding   Finding: \"There are scattered simple cysts measuring up to 2.4 cm. Subcentimeter hypoattenuating lesion(s), too small to characterize but statistically likely benign, which do not require follow-up (ACR White Paper 2017).\"   Follow up required: Follow-up with PCP to discuss above findings.   Follow up should be done within 2-4 week(s)    Please notify the following clinician to assist with the follow up:   Dr. Simi Elizabeth    Incidental finding results were discussed with the Patient's POA- son by NABEEL More on 07/01/25.   They expressed understanding and all questions answered.  "

## 2025-07-01 NOTE — CASE MANAGEMENT
Case Management Discharge Planning Note    Patient name Alice Rangel  Location S /S -01 MRN [id number]  : 1930 Date 2025       Current Admission Date: 2025  Current Admission Diagnosis:SDH (subdural hematoma) (HCC)   Patient Active Problem List    Diagnosis Date Noted    Cognitive impairment 2025    Urinary tract infection 2025    At risk for delirium 2025    Anemia 2025    Fall 2025    Lip laceration 2025    Skin tear of hand without complication 2025    SAH (subarachnoid hemorrhage) (HCC) 2025    SDH (subdural hematoma) (HCC) 2025      LOS (days): 2  Geometric Mean LOS (GMLOS) (days): 3  Days to GMLOS:0.6     OBJECTIVE:  Risk of Unplanned Readmission Score: 9.86         Current admission status: Inpatient   Preferred Pharmacy:   [facility] [address] [address]  Phone: [phone] Fax: [phone]    Primary Care Provider: Simi Elizabeth    Primary Insurance: MEDICARE  Secondary Insurance:     DISCHARGE DETAILS:          Comments - Freedom of Choice: CC informed that patient is medically stable for dc today. CC confirmed with Halley that patient can return before 4pm, will need to RN through [facility] and a signed med list. CC provided update to provider. CC also updated providers, Halley and RN that transport is currently set for 3:45pm. Family not in agreement with dc at this time. They will appeal discharge. Plan currently remains for patient to return to [facility] once determination is complete.  CM contacted family/caregiver?: Yes  Were Treatment Team discharge recommendations reviewed with patient/caregiver?: Yes  Did patient/caregiver verbalize understanding of patient care needs?: N/A- going to facility  Were patient/caregiver advised of the risks associated with not following Treatment Team discharge recommendations?:  Yes    Contacts  Patient Contacts: Patient and son  Relationship to Patient:: Family  Contact Method: Phone, In Person  Phone Number: [phone]  Reason/Outcome: Discharge Planning    Requested Home Health Care         Is the patient interested in HHC at discharge?: Yes  Home Health Discipline requested:: Physical Therapy, Occupational Therapy, Nursing  Home Health Agency Name:: [facility] External Referral Reason (only applicable if external HHA name selected): Patient has established relationship with provider  Home Health Follow-Up Provider:: PCP  Home Health Services Needed:: Evaluate Functional Status and Safety, Gait/ADL Training, Strengthening/Theraputic Exercises to Improve Function  Homebound Criteria Met:: Requires the Assistance of Another Person for Safe Ambulation or to Leave the Home  Supporting Clincal Findings:: Limited Endurance, Fatigues Easliy in Short Distances    DME Referral Provided  Referral made for DME?: No    Other Referral/Resources/Interventions Provided:  Referral Comments: referral made to [facility] for C services         Treatment Team Recommendation: Assisted Living  Expected Discharge Disposition: Assisted Living Facility  Additional Discharge Dispositions: Assisted Living Facility                                IMM Given (Date):: 07/01/25  IMM Given to:: Family

## 2025-07-01 NOTE — DISCHARGE SUMMARY
"Discharge Summary - Trauma   Name: Alice Rangel 89 y.o. female I MRN: [id number]  Unit/Bed#: S -01 I Date of Admission: 6/29/2025   Date of Service: 7/1/2025 I Hospital Day: 2    Admission Date: 6/29/2025 0436  Discharge Date: 07/01/25  Admitting Diagnosis: Multiple injuries [T07.XXXA]  SAH (subarachnoid hemorrhage) (HCC) [I60.9]  Discharge Diagnosis:   Medical Problems       Resolved Problems  Date Reviewed: 6/29/2025   None         HPI: Alice Rangel is a 89 y.o. 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 She reports immediate bleeding from her mouth and nose. She did not lose consciousness. She complains of pain in her right hand at the site of two skin tears. She denies headache, neck pain, chest pain, shortness of breath, abdominal pain, nausea, vomiting, or other extremity pain.   As documented by Zakia Wagoner PA-C on 6/29/2025    Procedures Performed:   Orders Placed This Encounter   Procedures    Fast Ultrasound    Laceration repair       Summary of Hospital Course:  89 year old patient was brought in by EMS after a fall at her facility where she tripped and landed on her face. She was found to have a \"tiny\" subdural hematoma and a \"tiny\" subarachnoid hemorrhage. For this she was admitted to the trauma service as a step down patient and underwent frequent neuro checks. Repeat interval scan showed stability of the bleeds and neurosurgery has signed off, recommending to see the patient in the office in 2 weeks with repeat imaging at that time. She was seen by PT and OT. At time of discharge her vital signs and labs remained stable and she reported feeling much better.       Significant Findings, Care, Treatment and Services Provided: XR hand 3+ vw right  Result Date: 6/30/2025  Impression: No acute osseous abnormality. Computerized Assisted Algorithm (CAA) may have been used to analyze all applicable " images. Workstation performed: [id number]     XR Trauma multiple (SLB/SLRA trauma bay ONLY)  Result Date: 6/30/2025  Impression: Bilateral peribronchial wall thickening suspicious for an infectious or inflammatory bronchiolitis. Mild pulmonary vascular congestion. Cardiomegaly. Computerized Assisted Algorithm (CAA) may have been used to analyze all applicable images. Workstation performed: [id number]     CT head wo contrast  Result Date: 6/30/2025  Impression: Stable trace left parafalcine subdural hematoma, with stable trace subarachnoid hemorrhage along the parasagittal left frontal lobe. Right frontal scalp hematoma. Workstation performed: [id number]     XR chest 1 view  Result Date: 6/29/2025  Impression: Bilateral peribronchial wall thickening suspicious for an infectious or inflammatory bronchiolitis. Mild pulmonary vascular congestion. Cardiomegaly. Computerized Assisted Algorithm (CAA) may have been used to analyze all applicable images. Workstation performed: [id number]     TRAUMA - CT chest abdomen pelvis w contrast  Result Date: 6/29/2025  Impression: No findings of acute traumatic injury in the chest, abdomen or pelvis. Mild pulmonary vascular congestion. Trace right pleural fluid. No pneumothorax. No acute intra-abdominal abnormality. No free air or free fluid. Scattered colonic diverticulosis with no inflammatory changes present to suggest acute diverticulitis. Chronic appearing compression deformities of the T1, T2, T3, L5 vertebral bodies. Computerized Assisted Algorithm (CAA) may have aided analysis of applicable images. Workstation performed: [id number]     TRAUMA - CT spine cervical wo contrast  Result Date: 6/29/2025  Impression: No acute cervical spine fracture or traumatic malalignment. Workstation performed: [id number]     TRAUMA - CT head wo contrast  Result Date: 6/29/2025  Impression: Tiny amount of acute interhemispheric subdural hemorrhage. Tiny amount of left paramedian frontal acute  subarachnoid hemorrhage. No mass effect or midline shift. I personally discussed this study with DULCE TOMPKINS on 6/29/2025 5:10 AM. Workstation performed: [id number]         Complications: none    Condition at Discharge: good       Discharge instructions/Information to patient and family:   See After Visit Summary (AVS) for information provided to patient and family.      Provisions for Follow-Up Care:  See after visit summary for information related to follow-up care and any pertinent home health orders.      PCP: Simi Elizabeth    Dis[facility]    Planned Readmission: No     Discharge Medications:  See after visit summary for reconciled discharge medications provided to patient and family.      Discharge Statement:  I have spent a total time of 22 minutes in caring for this patient on the day of the visit/encounter. .

## 2025-07-01 NOTE — PLAN OF CARE
Problem: PAIN - ADULT  Goal: Verbalizes/displays adequate comfort level or baseline comfort level  Description: Interventions:  - Encourage patient to monitor pain and request assistance  - Assess pain using appropriate pain scale  - Administer analgesics as ordered based on type and severity of pain and evaluate response  - Implement non-pharmacological measures as appropriate and evaluate response  - Consider cultural and social influences on pain and pain management  - Notify physician/advanced practitioner if interventions unsuccessful or patient reports new pain  - Educate patient/family on pain management process including their role and importance of  reporting pain   - Provide non-pharmacologic/complimentary pain relief interventions  Outcome: Progressing     Problem: INFECTION - ADULT  Goal: Absence or prevention of progression during hospitalization  Description: INTERVENTIONS:  - Assess and monitor for signs and symptoms of infection  - Monitor lab/diagnostic results  - Monitor all insertion sites, i.e. indwelling lines, tubes, and drains  - Monitor endotracheal if appropriate and nasal secretions for changes in amount and color  - [place] appropriate cooling/warming therapies per order  - Administer medications as ordered  - Instruct and encourage patient and family to use good hand hygiene technique  - Identify and instruct in appropriate isolation precautions for identified infection/condition  Outcome: Progressing

## 2025-07-02 LAB
BACTERIA UR CULT: ABNORMAL
BACTERIA UR CULT: ABNORMAL

## 2025-07-02 PROCEDURE — 97110 THERAPEUTIC EXERCISES: CPT

## 2025-07-02 PROCEDURE — 99232 SBSQ HOSP IP/OBS MODERATE 35: CPT | Performed by: SURGERY

## 2025-07-02 PROCEDURE — 97116 GAIT TRAINING THERAPY: CPT

## 2025-07-02 PROCEDURE — 97530 THERAPEUTIC ACTIVITIES: CPT

## 2025-07-02 PROCEDURE — 99232 SBSQ HOSP IP/OBS MODERATE 35: CPT | Performed by: FAMILY MEDICINE

## 2025-07-02 RX ADMIN — LEVETIRACETAM 500 MG: 500 TABLET, FILM COATED ORAL at 17:42

## 2025-07-02 RX ADMIN — POLYETHYLENE GLYCOL 3350 17 G: 17 POWDER, FOR SOLUTION ORAL at 08:29

## 2025-07-02 RX ADMIN — ACETAMINOPHEN 975 MG: 325 TABLET ORAL at 08:29

## 2025-07-02 RX ADMIN — LEVETIRACETAM 500 MG: 500 TABLET, FILM COATED ORAL at 08:29

## 2025-07-02 RX ADMIN — ENOXAPARIN SODIUM 30 MG: 30 INJECTION SUBCUTANEOUS at 20:23

## 2025-07-02 RX ADMIN — SENNOSIDES 17.2 MG: 8.6 TABLET, FILM COATED ORAL at 20:24

## 2025-07-02 RX ADMIN — SULFAMETHOXAZOLE AND TRIMETHOPRIM 1 TABLET: 800; 160 TABLET ORAL at 20:24

## 2025-07-02 RX ADMIN — CARVEDILOL 25 MG: 12.5 TABLET, FILM COATED ORAL at 08:29

## 2025-07-02 RX ADMIN — AMIODARONE HYDROCHLORIDE 100 MG: 200 TABLET ORAL at 08:29

## 2025-07-02 RX ADMIN — ACETAMINOPHEN 975 MG: 325 TABLET ORAL at 20:24

## 2025-07-02 RX ADMIN — CARVEDILOL 25 MG: 12.5 TABLET, FILM COATED ORAL at 17:42

## 2025-07-02 RX ADMIN — SULFAMETHOXAZOLE AND TRIMETHOPRIM 1 TABLET: 800; 160 TABLET ORAL at 08:30

## 2025-07-02 RX ADMIN — ENOXAPARIN SODIUM 30 MG: 30 INJECTION SUBCUTANEOUS at 08:29

## 2025-07-02 NOTE — DISCHARGE SUPPORT
Case Management Assessment & Discharge Planning Note    Patient name Alice Rangel  Location S /S -01 MRN [id number]  : 1930 Date 2025       Current Admission Date: 2025  Current Admission Diagnosis:SDH (subdural hematoma) (HCC)   Patient Active Problem List    Diagnosis Date Noted    Cognitive impairment 2025    Urinary tract infection 2025    At risk for delirium 2025    Anemia 2025    Fall 2025    Lip laceration 2025    Skin tear of hand without complication 2025    SAH (subarachnoid hemorrhage) (HCC) 2025    SDH (subdural hematoma) (HCC) 2025      LOS (days): 3  Geometric Mean LOS (GMLOS) (days): 3  Days to GMLOS:-0.1   [employer] Appeal Submitted  [facility] was tasked to submit clinicals for a [employer] Appeal by Care Manager: Bro BOJORQUEZ  Case Control ID: [id number]  EMR Key: [id number]  Appeal is pending. Clinicals sent via: [employer] Online Portal   Notified: Bro BOJORQUEZ       Please reach out to  for updates on any clinical information.

## 2025-07-02 NOTE — ASSESSMENT & PLAN NOTE
Started on Bactrim by primary team.  Completing today.  Monitor for side effects and renally adjust as needed  Encourage p.o. hydration  Out of bed as tolerated and monitor for urinary retention

## 2025-07-02 NOTE — PROGRESS NOTES
Progress Note - Trauma   Name: Alice Rangel 89 y.o. female I MRN: [id number]  Unit/Bed#: S -01 I Date of Admission: 6/29/2025   Date of Service: 7/2/2025 I Hospital Day: 3    Assessment & Plan  Fall  - Status post fall with the below noted injuries.  - Fall precautions.    SDH (subdural hematoma) (HCC)  SAH (subarachnoid hemorrhage) (HCC)  - Neuro exam: GCS 15, non-focal  - Continue neurologic checks: Every 4 hours.  - Reversal agent administered: K-Centra  - CT scan of the head on 6/29 reviewed: tiny interhemispheric SDH, tiny left paramedian frontal SAH  - Repeat CT scan of the head in the evening of 6/29 with stable SAH/SDH   - Appreciate Neurosurgery evaluation and recommendations.  - Complete 7 day course of Keppra for seizure prophylaxis  - Chemical DVT prophylaxis: Lovenox  - Hold all anticoagulants and anti platelet medications for 2 weeks and/or until cleared by Neurosurgery to resume.  - PT and OT (including cognitive evaluation) evaluation and treatment as indicated.  Lip laceration  - Upper buccal lip laceration with active bleeding  - Improved with Surgicel and ice and chromic suture  - Continue local wound care  Skin tear of hand without complication  - Right dorsal hand skin tear x 2   - Local wound care  - Ace wrap for compression with adequate hemostasis   - continue local wound care with dressing change every 4 days.   Urinary tract infection  - present on admission  - treat with three day course of Bactrim due to PCN allergy    Bowel Regimen: MiraLAX and senna  VTE Prophylaxis:Heparin     Disposition: Placement at rehab facility. I have discussed the above management plan in detail with the primary service.     24 Hour Events : Family is currently filing for discharge appeal.  Subjective : Patient alert and resting comfortably in bed.  She states she slept well through the night with only minor discomfort in her right hand.  She states Tylenol has been controlling her pain.    Objective  :  Temp:  [98 °F (36.7 °C)-98.8 °F (37.1 °C)] 98 °F (36.7 °C)  HR:  [60-75] 67  BP: ()/(35-57) 119/57  Resp:  [18] 18  SpO2:  [89 %-98 %] 98 %    I/O         06/30 0701  07/01 0700 07/01 0701  07/02 0700 07/02 0701  07/03 0700    P.O. 480 240     Total Intake(mL/kg) 480 (5.7) 240 (2.8)     Urine (mL/kg/hr) 575 (0.3) 250 (0.1)     Total Output 575 250     Net -95 -10            Unmeasured Urine Occurrence 5 x              Physical Exam  Vitals and nursing note reviewed.   Constitutional:       General: She is not in acute distress.     Appearance: She is well-developed.   HENT:      Head: Normocephalic.      Comments: Facial bruising over upper lip and anterior neck with dry crusted blood surrounding left nare.    Eyes:      Conjunctiva/sclera: Conjunctivae normal.       Cardiovascular:      Rate and Rhythm: Normal rate and regular rhythm.      Heart sounds: No murmur heard.  Pulmonary:      Effort: Pulmonary effort is normal. No respiratory distress.      Breath sounds: Normal breath sounds.   Abdominal:      Palpations: Abdomen is soft.      Tenderness: There is no abdominal tenderness.     Musculoskeletal:      Cervical back: Neck supple.      Right lower leg: No edema.      Left lower leg: No edema.     Skin:     General: Skin is warm and dry.      Capillary Refill: Capillary refill takes less than 2 seconds.     Neurological:      Mental Status: She is alert.     Psychiatric:         Mood and Affect: Mood normal.               Lab Results: I have reviewed the following results:  Recent Labs     06/30/25  0512   WBC 7.58   HGB 8.5*   HCT 26.3*      SODIUM 136   K 4.3      CO2 25   BUN 38*   CREATININE 1.11   GLUC 94       Imaging Results Review: No pertinent imaging studies reviewed.  Other Study Results Review: No additional pertinent studies reviewed.

## 2025-07-02 NOTE — DISCHARGE SUPPORT
Rec'd voicemail from [employer]zen[employer] @ [facility] stating that they did not receive medical records for appeal filed for patient. Case [id number]. Can call back P# [phone] if needed.     EAN notified: Bro BOJORQUEZ

## 2025-07-02 NOTE — PHYSICAL THERAPY NOTE
PHYSICAL THERAPY NOTE          Patient Name: Alice Rangel  Today's Date: 7/2/2025 07/02/25 1537   PT Last Visit   PT Visit Date 07/02/25   Note Type   Note Type Treatment   Pain Assessment   Pain Assessment Tool 0-10   Pain Score No Pain   Restrictions/Precautions   Weight Bearing Precautions Per Order No   Other Precautions Cognitive;Chair Alarm;Bed Alarm;Fall Risk;Hard of hearing   General   Chart Reviewed Yes   Additional Pertinent History Pt is a 89 yr old female admitted 6/29/25 s/p fall resulting in SAH and SDH, lip laceration. PMH includes: falls, Afib, pulmonary HTN, complete heart block with pacemaker.   Response to Previous Treatment Patient with no complaints from previous session.   Family/Caregiver Present No   Cognition   Overall Cognitive Status WFL   Arousal/Participation Responsive;Cooperative;Alert   Attention Within functional limits   Orientation Level Oriented X4   Memory Within functional limits   Following Commands Follows one step commands without difficulty   Comments pt was pleasant and cooperative throughout todays tx session. pt required VC's for safety and balance with ambulation in todays tx session   Subjective   Subjective pt was agreeable to participate in PT tx session and reports no pain pre/post tx session   Bed Mobility   Supine to Sit 6  Modified independent   Additional items Assist x 1;HOB elevated;Bedrails;Increased time required   Sit to Supine 6  Modified independent   Additional items Assist x 1;HOB elevated;Bedrails;Increased time required   Additional Comments pt was able to sit EOB w/o LOB while participating in TE activities >8 minutes in order to strengthen LE's and increase pt static/dynamic sitting balance   Transfers   Sit to Stand 6  Modified independent   Additional items Assist x 1;Increased time required;Verbal cues   Stand to Sit 6  Modified independent   Additional items  Assist x 1;Armrests;Increased time required;Verbal cues   Stand pivot 6  Modified independent   Additional items Assist x 1;Armrests;Increased time required;Verbal cues   Toilet transfer 6  Modified independent   Additional items Assist x 1;Armrests;Increased time required;Standard toilet   Additional Comments pt demonstrated good recall on hand placement from previous tx sessions and complete multiple functional transfers from EOB/recliner and standards toilet with mod I, no LOB   Ambulation/Elevation   Gait pattern Decreased foot clearance;Narrow GILBERTO;Short stride;Foward flexed;Excessively slow;Decreased hip extension;Decreased heel strike;Decreased toe off   Gait Assistance 4  Minimal assist   Additional items Assist x 1;Verbal cues   Assistive Device Rolling walker   Distance 90'x1 RW, 70'x1 RW 20'x1 RW   Stair Management Assistance Not tested   Ambulation/Elevation Additional Comments pt required RW for all ambulation in todays tx session. pt was min Ax1, VC's required as pt would veer towards her L side constantly.   Balance   Static Sitting Fair +   Dynamic Sitting Fair   Static Standing Fair -   Dynamic Standing Poor +   Ambulatory Poor +  (w/ RW)   Endurance Deficit   Endurance Deficit Yes   Endurance Deficit Description limited activity tolerance and ambulation distance   Activity Tolerance   Activity Tolerance Patient limited by fatigue   Nurse Made Aware Spoke to RN   Exercises   Quad Sets Supine;10 reps;AROM;Bilateral   Hip Abduction Sitting;15 reps;AROM;Bilateral   Hip Adduction Sitting;15 reps;AROM;Bilateral  (pillow squeezes)   Knee AROM Long Arc Quad Sitting;15 reps;AROM;Bilateral   Ankle Pumps Sitting;20 reps;AROM;Bilateral  (20 seated bilateral heel raises)   Marching Sitting;10 reps;AROM;Bilateral   Assessment   Prognosis Good   Problem List Decreased strength;Decreased endurance;Impaired balance;Decreased coordination;Decreased cognition;Decreased mobility;Impaired judgement;Decreased safety  awareness;Pain;Decreased skin integrity   Assessment pt began tx session lying supine in the bed as pt was agreeable to participate in PT tx session. Progress was noted compared to last PT tx session as pt was able to complete all bed mobility with mod I as pt used bed rail to complete transfer to seated EOB. pt was able to increase her static/dynamic sitting balance as pt completed TE at EOB >8 minutes w/o LOB. Progress was again notices at pt completed multiple functional transfers with RW from EOB/recliner/standard toilet with good recall on hand placement, mod I and no LOB in todays tx session. pt did increase her activity tolerance and ambulation distance with RW to 90'x1, 70'x1 and 20'x1 w/ min Ax1 for safety and balance. pt required RW management whith ambulation due to pt constantly veering towards her Left side. Post tx pt in the bed with call bell, bed alrma activated and all pt needs met. pt would benefit from continued skilled PT tx sessions in order to increase pt safety w/ OOB activities and RW management   Goals   Patient Goals to go home soon   STG Expiration Date 07/14/25   PT Treatment Day 2   Plan   Treatment/Interventions Functional transfer training;LE strengthening/ROM;Therapeutic exercise;Endurance training;Cognitive reorientation;Patient/family training;Equipment eval/education;Bed mobility;Gait training;Spoke to nursing;Continued evaluation   Progress Slow progress, decreased activity tolerance   PT Frequency 2-3x/wk   Discharge Recommendation   Rehab Resource Intensity Level, PT III (Minimum Resource Intensity)   AM-PAC Basic Mobility Inpatient   Turning in Flat Bed Without Bedrails 4   Lying on Back to Sitting on Edge of Flat Bed Without Bedrails 4   Moving Bed to Chair 3   Standing Up From Chair Using Arms 3   Walk in Room 3   Climb 3-5 Stairs With Railing 2   Basic Mobility Inpatient Raw Score 19   Basic Mobility Standardized Score 42.48   [facility] Highest Level Of Mobility   [facility]  Goal 6: Walk 10 steps or more   -HLM Achieved 7: Walk 25 feet or more   Education   Education Provided Mobility training;Assistive device;Other  (TE activities, transfer training)   Patient Demonstrates acceptance/verbal understanding   End of Consult   Patient Position at End of Consult Supine;All needs within reach;Bed/Chair alarm activated   The patient's AM-PAC Basic Mobility Inpatient Short Form Raw Score is 19. A Raw score of greater than 16 suggests the patient may benefit from discharge to home. Please also refer to the recommendation of the Physical Therapist for safe discharge planning.    Jasiel Barkley

## 2025-07-02 NOTE — ASSESSMENT & PLAN NOTE
Patient presents with a fall and head strike.  Uses a walker for ambulation at baseline.    Plan  Encourage oral hydration  Fall precautions  PT/OT  Avoid hypotension and hypoglycemia

## 2025-07-02 NOTE — PLAN OF CARE
Problem: PHYSICAL THERAPY ADULT  Goal: Performs mobility at highest level of function for planned discharge setting.  See evaluation for individualized goals.  Description: Treatment/Interventions: Functional transfer training, LE strengthening/ROM, Therapeutic exercise, Endurance training, Cognitive reorientation, Patient/family training, Equipment eval/education, Bed mobility, Gait training, Continued evaluation, Spoke to nursing, Spoke to case management     See flowsheet documentation for full assessment, interventions and recommendations.  Outcome: Progressing  Note: Prognosis: Good  Problem List: Decreased strength, Decreased endurance, Impaired balance, Decreased coordination, Decreased cognition, Decreased mobility, Impaired judgement, Decreased safety awareness, Pain, Decreased skin integrity  Assessment: pt began tx session lying supine in the bed as pt was agreeable to participate in PT tx session. Progress was noted compared to last PT tx session as pt was able to complete all bed mobility with mod I as pt used bed rail to complete transfer to seated EOB. pt was able to increase her static/dynamic sitting balance as pt completed TE at EOB >8 minutes w/o LOB. Progress was again notices at pt completed multiple functional transfers with RW from EOB/recliner/standard toilet with good recall on hand placement, mod I and no LOB in todays tx session. pt did increase her activity tolerance and ambulation distance with RW to 90'x1, 70'x1 and 20'x1 w/ min Ax1 for safety and balance. pt required RW management whith ambulation due to pt constantly veering towards her Left side. Post tx pt in the bed with call bell, bed alrma activated and all pt needs met. pt would benefit from continued skilled PT tx sessions in order to increase pt safety w/ OOB activities and RW management        Rehab Resource Intensity Level, PT: III (Minimum Resource Intensity)    See flowsheet documentation for full assessment.

## 2025-07-02 NOTE — ASSESSMENT & PLAN NOTE
Patient presents with subdural hematoma and subarachnoid hematoma following a fall.  Neurosurgery is following    Plan  Continue to monitor neurochecks  Holding Xarelto, on DVT prophylaxis  On Keppra for seizure prophylaxis for 7 days  PT/OT

## 2025-07-02 NOTE — PROGRESS NOTES
Progress Note - Geriatric Medicine   Name: Alice Rangel 89 y.o. female I MRN: [id number]  Unit/Bed#: S -01 I Date of Admission: 6/29/2025   Date of Service: 7/2/2025 I Hospital Day: 3     Assessment & Plan  SDH (subdural hematoma) (HCC)  Patient presents with subdural hematoma and subarachnoid hematoma following a fall.  Neurosurgery is following    Plan  Continue to monitor neurochecks  Holding Xarelto, on DVT prophylaxis  On Keppra for seizure prophylaxis for 7 days  PT/OT  At risk for delirium  Patient is A and O x 3.  Patient has increased risk for delirium due to baseline cognitive impairment, SAH/SDH, UTI on Bactrim, change in environment, pain    Plan  Continue delirium precautions  Maintain normal sleep/wake cycles  Adequate pain control  Avoid constipation and urinary retention  Encourage oral intake, assist with feeding if needed  Encourage ambulation and mobilization  Frequent reorientation and redirection  Encourage family and friends at bedside  Avoid medications that can precipitate delirium: Tramadol, antihistamines, anticholinergics, benzodiazepines    Cognitive impairment  Recall 1/3.  Not able to draw the clock due to pain in right hand.  Requires assistant with all IADLs at baseline.  CT head with chronic microangiopathic changes.  Currently A and O x 3, calm and pleasant.  Vitamin B12 and TSH levels normal    Plan  Continue supportive care  Reorientation as needed  Delirium precautions  See plan for #2  Follow-up with geriatrics outpatient  Fall  Patient presents with a fall and head strike.  Uses a walker for ambulation at baseline.    Plan  Encourage oral hydration  Fall precautions  PT/OT  Avoid hypotension and hypoglycemia    Lip laceration  Swelling is gradually improving.  Able to eat and drink.  No much pain    Skin tear of hand without complication  Continue local care  Pain management as per primary team  Apply ice as tolerated  X-ray right hand ruled out fracture  Urinary tract  "infection  Started on Bactrim by primary team.  Completing today.  Monitor for side effects and renally adjust as needed  Encourage p.o. hydration  Out of bed as tolerated and monitor for urinary retention  Anemia  Hemoglobin around 8.  Continue to monitor  Transfuse as needed    Subjective: Patient was seen and examined at bedside.  Patient is feeling much better.  Lip swelling is improving, still having some intermittent pain in right hand.  But responding to Tylenol.  Had a good sleep last night.  Eating and drinking good.  Having regular bowel movements and urine output.  A&O x 3.    Review of Systems   Constitutional:  Negative for chills and fever.   HENT:  Negative for ear pain and sore throat.    Eyes:  Negative for pain and visual disturbance.   Respiratory:  Negative for cough and shortness of breath.    Cardiovascular:  Negative for chest pain and palpitations.   Gastrointestinal:  Negative for abdominal pain and vomiting.   Genitourinary:  Negative for dysuria and hematuria.   Musculoskeletal:  Positive for myalgias (R arm). Negative for arthralgias and back pain.   Skin:  Negative for color change and rash.   Neurological:  Negative for seizures and syncope.   All other systems reviewed and are negative.        Objective:     Vitals: Blood pressure 119/57, pulse 67, temperature 98 °F (36.7 °C), temperature source Oral, resp. rate 18, height 5' 5\" (1.651 m), weight 84.3 kg (185 lb 13.6 oz), SpO2 98%.,Body mass index is 30.93 kg/m².      Intake/Output Summary (Last 24 hours) at 7/2/2025 0932  Last data filed at 7/2/2025 0836  Gross per 24 hour   Intake --   Output 310 ml   Net -310 ml       Current Medications: Reviewed    Physical Exam:   Physical Exam  Vitals and nursing note reviewed.   Constitutional:       General: She is not in acute distress.     Appearance: She is well-developed.   HENT:      Head: Normocephalic and atraumatic.      Comments: Swelling and dried blood on and above upper lip     " Mouth/Throat:      Mouth: Mucous membranes are moist.      Pharynx: Oropharynx is clear.     Eyes:      Conjunctiva/sclera: Conjunctivae normal.       Cardiovascular:      Rate and Rhythm: Normal rate and regular rhythm.      Heart sounds: No murmur heard.  Pulmonary:      Effort: Pulmonary effort is normal. No respiratory distress.      Breath sounds: Normal breath sounds.   Abdominal:      Palpations: Abdomen is soft.      Tenderness: There is no abdominal tenderness.     Musculoskeletal:         General: No swelling.      Cervical back: Neck supple.      Comments: Swelling in right upper arm, improving     Skin:     General: Skin is warm and dry.      Capillary Refill: Capillary refill takes less than 2 seconds.     Neurological:      Mental Status: She is alert.     Psychiatric:         Mood and Affect: Mood normal.          Invasive Devices       Peripheral Intravenous Line  Duration             Peripheral IV 06/29/25 Left;Proximal;Ventral (anterior) Antecubital 3 days                    Lab, Imaging and other studies: Results Review Statement: No pertinent imaging studies reviewed.

## 2025-07-03 PROBLEM — N39.0 URINARY TRACT INFECTION: Status: RESOLVED | Noted: 2025-06-30 | Resolved: 2025-07-03

## 2025-07-03 PROCEDURE — 99233 SBSQ HOSP IP/OBS HIGH 50: CPT | Performed by: FAMILY MEDICINE

## 2025-07-03 PROCEDURE — 99232 SBSQ HOSP IP/OBS MODERATE 35: CPT | Performed by: SURGERY

## 2025-07-03 RX ADMIN — ACETAMINOPHEN 975 MG: 325 TABLET ORAL at 16:23

## 2025-07-03 RX ADMIN — ACETAMINOPHEN 975 MG: 325 TABLET ORAL at 23:50

## 2025-07-03 RX ADMIN — LEVETIRACETAM 500 MG: 500 TABLET, FILM COATED ORAL at 08:16

## 2025-07-03 RX ADMIN — CARVEDILOL 25 MG: 12.5 TABLET, FILM COATED ORAL at 08:17

## 2025-07-03 RX ADMIN — POLYETHYLENE GLYCOL 3350 17 G: 17 POWDER, FOR SOLUTION ORAL at 08:17

## 2025-07-03 RX ADMIN — AMIODARONE HYDROCHLORIDE 100 MG: 200 TABLET ORAL at 08:16

## 2025-07-03 RX ADMIN — LEVETIRACETAM 500 MG: 500 TABLET, FILM COATED ORAL at 16:23

## 2025-07-03 RX ADMIN — ENOXAPARIN SODIUM 30 MG: 30 INJECTION SUBCUTANEOUS at 08:16

## 2025-07-03 RX ADMIN — ACETAMINOPHEN 975 MG: 325 TABLET ORAL at 06:14

## 2025-07-03 RX ADMIN — ENOXAPARIN SODIUM 30 MG: 30 INJECTION SUBCUTANEOUS at 21:06

## 2025-07-03 RX ADMIN — CARVEDILOL 25 MG: 12.5 TABLET, FILM COATED ORAL at 16:23

## 2025-07-03 NOTE — CASE MANAGEMENT
Case Management Progress Note    Patient name Alice JACOBS /S -01 MRN [id number]  : 1930 Date 7/3/2025       LOS (days): 4  Geometric Mean LOS (GMLOS) (days): 3  Days to GMLOS:-1.3        OBJECTIVE:        Current admission status: Inpatient  Preferred Pharmacy:   [facility] - [place], [place] - [address]  [address] [facility] [address]  Phone: [phone] Fax: [phone]    Primary Care Provider: Simi Elizabeth    Primary Insurance: MEDICARE  Secondary Insurance:     PROGRESS NOTE:     received a return call from Lelia at  [place] [phone]. She is aware that family appealed pt's dc. If appeal determination received today pt may be able to return tomorrow pending outcome. Lelia will contact  with what is needed for return tomorrow.

## 2025-07-03 NOTE — ASSESSMENT & PLAN NOTE
Patient is A and O x 3.  Patient has increased risk for delirium due to baseline cognitive impairment, SAH/SD, change in environment, and pain    Plan  Continue delirium precautions  Maintain normal sleep/wake cycles  Adequate pain control  Avoid constipation and urinary retention  Encourage oral intake, assist with feeding if needed  Encourage ambulation and mobilization  Frequent reorientation and redirection  Encourage family and friends at bedside  Avoid medications that can precipitate delirium: Tramadol, antihistamines, anticholinergics, benzodiazepines

## 2025-07-03 NOTE — ASSESSMENT & PLAN NOTE
Continue local care  Pain management as per primary team  Apply ice as tolerated  X-ray right hand ruled out fracture   [address] Detailed

## 2025-07-03 NOTE — PROGRESS NOTES
Progress Note - Geriatric Medicine   Name: Alice Rangel 89 y.o. female I MRN: [id number]  Unit/Bed#: S -01 I Date of Admission: 6/29/2025   Date of Service: 7/3/2025 I Hospital Day: 4     Assessment & Plan  SDH (subdural hematoma) (HCC)  Patient presents with subdural hematoma and subarachnoid hematoma following a fall.  Neurosurgery is following    Plan  Continue to monitor neurochecks  Holding Xarelto, on DVT prophylaxis  On Keppra for seizure prophylaxis for 7 days  PT/OT  At risk for delirium  Patient is A and O x 3.  Patient has increased risk for delirium due to baseline cognitive impairment, SAH/SD, change in environment, and pain    Plan  Continue delirium precautions  Maintain normal sleep/wake cycles  Adequate pain control  Avoid constipation and urinary retention  Encourage oral intake, assist with feeding if needed  Encourage ambulation and mobilization  Frequent reorientation and redirection  Encourage family and friends at bedside  Avoid medications that can precipitate delirium: Tramadol, antihistamines, anticholinergics, benzodiazepines    Cognitive impairment  Recall 1/3.  Not able to draw the clock due to pain in right hand.  Requires assistant with all IADLs at baseline.  CT head with chronic microangiopathic changes.  Currently A and O x 3, calm and pleasant.  Vitamin B12 and TSH levels normal    Plan  Continue supportive care  Reorientation as needed  Delirium precautions  See plan for #2  Follow-up with geriatrics outpatient  Fall  Patient presents with a fall and head strike.  Uses a walker for ambulation at baseline.    Plan  Encourage oral hydration  Fall precautions  PT/OT  Avoid hypotension and hypoglycemia    Lip laceration  Swelling is gradually improving.  Able to eat and drink.  No much pain    Skin tear of hand without complication  Continue local care  Pain management as per primary team  Apply ice as tolerated  X-ray right hand ruled out fracture  Urinary tract infection  "(Resolved: 7/3/2025)  Completed 3 days of Bactrim  Encourage p.o. hydration  Out of bed as tolerated and monitor for urinary retention  Anemia  Hemoglobin around 8.  Continue to monitor  Transfuse as needed    Subjective: Patient was seen and examined at bedside.  No new complaints.  Pain in the arm is much better.  Having adequate oral intake.  Having regular bowel movements.  A&O x 3.    Review of Systems   Constitutional:  Negative for chills and fever.   HENT:  Negative for ear pain and sore throat.    Eyes:  Negative for pain and visual disturbance.   Respiratory:  Negative for cough and shortness of breath.    Cardiovascular:  Negative for chest pain and palpitations.   Gastrointestinal:  Negative for abdominal pain and vomiting.   Genitourinary:  Negative for dysuria and hematuria.   Musculoskeletal:  Positive for myalgias (R arm). Negative for arthralgias and back pain.   Skin:  Negative for color change and rash.   Neurological:  Negative for seizures and syncope.   All other systems reviewed and are negative.        Objective:     Vitals: Blood pressure (!) 126/44, pulse 65, temperature 97.6 °F (36.4 °C), temperature source Oral, resp. rate 18, height 5' 5\" (1.651 m), weight 84.3 kg (185 lb 13.6 oz), SpO2 97%.,Body mass index is 30.93 kg/m².      Intake/Output Summary (Last 24 hours) at 7/3/2025 0940  Last data filed at 7/3/2025 0931  Gross per 24 hour   Intake 240 ml   Output 1225 ml   Net -985 ml       Current Medications: Reviewed    Physical Exam:   Physical Exam  Vitals and nursing note reviewed.   Constitutional:       General: She is not in acute distress.     Appearance: She is well-developed.   HENT:      Head: Normocephalic and atraumatic.      Comments: Swelling and dried blood on and above upper lip     Mouth/Throat:      Mouth: Mucous membranes are moist.      Pharynx: Oropharynx is clear.     Eyes:      Conjunctiva/sclera: Conjunctivae normal.       Cardiovascular:      Rate and Rhythm: " Normal rate and regular rhythm.      Heart sounds: No murmur heard.  Pulmonary:      Effort: Pulmonary effort is normal. No respiratory distress.      Breath sounds: Normal breath sounds.   Abdominal:      Palpations: Abdomen is soft.      Tenderness: There is no abdominal tenderness.     Musculoskeletal:         General: No swelling.      Cervical back: Neck supple.      Comments: Swelling in right upper arm, improving     Skin:     General: Skin is warm and dry.      Capillary Refill: Capillary refill takes less than 2 seconds.     Neurological:      Mental Status: She is alert.     Psychiatric:         Mood and Affect: Mood normal.          Invasive Devices       Peripheral Intravenous Line  Duration             Peripheral IV 07/03/25 Left;Ventral (anterior) Wrist <1 day                    Lab, Imaging and other studies: Results Review Statement: No pertinent imaging studies reviewed.

## 2025-07-03 NOTE — CASE MANAGEMENT
Case Management Progress Note    Patient name Alice JACOBS /S -01 MRN [id number]  : 1930 Date 7/3/2025       LOS (days): 4  Geometric Mean LOS (GMLOS) (days): 3  Days to GMLOS:-1.2        OBJECTIVE:        Current admission status: Inpatient  Preferred Pharmacy:   [facility] - [address]  [address] [facility] [address]  Phone: [phone] Fax: [phone]    Primary Care Provider: Simi Elizabeth    Primary Insurance: MEDICARE  Secondary Insurance:     PROGRESS NOTE:    EAN notified by  Director that Lelia with [facility] asking for an update. CM contacted [facility] [phone]. CM left [facility] requesting return call.

## 2025-07-03 NOTE — ASSESSMENT & PLAN NOTE
Completed 3 days of Bactrim  Encourage p.o. hydration  Out of bed as tolerated and monitor for urinary retention

## 2025-07-03 NOTE — PLAN OF CARE
Problem: PAIN - ADULT  Goal: Verbalizes/displays adequate comfort level or baseline comfort level  Description: Interventions:  - Encourage patient to monitor pain and request assistance  - Assess pain using appropriate pain scale  - Administer analgesics as ordered based on type and severity of pain and evaluate response  - Implement non-pharmacological measures as appropriate and evaluate response  - Consider cultural and social influences on pain and pain management  - Notify physician/advanced practitioner if interventions unsuccessful or patient reports new pain  - Educate patient/family on pain management process including their role and importance of  reporting pain   - Provide non-pharmacologic/complimentary pain relief interventions  Outcome: Progressing     Problem: INFECTION - ADULT  Goal: Absence or prevention of progression during hospitalization  Description: INTERVENTIONS:  - Assess and monitor for signs and symptoms of infection  - Monitor lab/diagnostic results  - Monitor all insertion sites, i.e. indwelling lines, tubes, and drains  - Monitor endotracheal if appropriate and nasal secretions for changes in amount and color  - [place] appropriate cooling/warming therapies per order  - Administer medications as ordered  - Instruct and encourage patient and family to use good hand hygiene technique  - Identify and instruct in appropriate isolation precautions for identified infection/condition  Outcome: Progressing     Problem: SAFETY ADULT  Goal: Patient will remain free of falls  Description: INTERVENTIONS:  - Educate patient/family on patient safety including physical limitations  - Instruct patient to call for assistance with activity   - Consider consulting OT/PT to assist with strengthening/mobility based on AM PAC & JH-HLM score  - Consult OT/PT to assist with strengthening/mobility   - Keep Call bell within reach  - Keep bed low and locked with side rails adjusted as appropriate  - Keep  care items and personal belongings within reach  - Initiate and maintain comfort rounds  - Make Fall Risk Sign visible to staff  - Offer Toileting every 2 Hours, in advance of need  - Initiate/Maintain bed alarm  - Obtain necessary fall risk management equipment  - Apply yellow socks and bracelet for high fall risk patients  - Consider moving patient to room near nurses station  Outcome: Progressing     Problem: DISCHARGE PLANNING  Goal: Discharge to home or other facility with appropriate resources  Description: INTERVENTIONS:  - Identify barriers to discharge w/patient and caregiver  - Arrange for needed discharge resources and transportation as appropriate  - Identify discharge learning needs (meds, wound care, etc.)  - Arrange for interpretive services to assist at discharge as needed  - Refer to Case Management Department for coordinating discharge planning if the patient needs post-hospital services based on physician/advanced practitioner order or complex needs related to functional status, cognitive ability, or social support system  Outcome: Progressing     Problem: Knowledge Deficit  Goal: Patient/family/caregiver demonstrates understanding of disease process, treatment plan, medications, and discharge instructions  Description: Complete learning assessment and assess knowledge base.  Interventions:  - Provide teaching at level of understanding  - Provide teaching via preferred learning methods  Outcome: Progressing

## 2025-07-03 NOTE — PROGRESS NOTES
"Progress Note - Trauma   Name: Alice Rangel 89 y.o. female I MRN: [id number]  Unit/Bed#: S -01 I Date of Admission: 6/29/2025   Date of Service: 7/3/2025 I Hospital Day: 4    Assessment & Plan  Fall  - Status post fall with the below noted injuries.  - Fall precautions.    SDH (subdural hematoma) (HCC)  SAH (subarachnoid hemorrhage) (HCC)  - Neuro exam: GCS 15, non-focal  - Continue neurologic checks: Every 4 hours.  - Reversal agent administered: K-Centra  - CT scan of the head on 6/29 reviewed: tiny interhemispheric SDH, tiny left paramedian frontal SAH  - Repeat CT scan of the head in the evening of 6/29 with stable SAH/SDH   - Appreciate Neurosurgery evaluation and recommendations.  - Complete 7 day course of Keppra for seizure prophylaxis  - Chemical DVT prophylaxis: Lovenox  - Hold all anticoagulants and anti platelet medications for 2 weeks and/or until cleared by Neurosurgery to resume.  - PT and OT (including cognitive evaluation) evaluation and treatment as indicated.  Lip laceration  - Upper buccal lip laceration with active bleeding  - Improved with Surgicel and ice and chromic suture  - Continue local wound care  Skin tear of hand without complication  - Right dorsal hand skin tear x 2   - Local wound care  - Ace wrap for compression with adequate hemostasis   - continue local wound care with dressing change every 4 days.   Urinary tract infection (Resolved: 7/3/2025)  - present on admission  - treat with three day course of Bactrim due to PCN allergy    Bowel Regimen: MiraLAX and senna  VTE Prophylaxis:Heparin     Disposition: Placement at rehab facility Please contact the SecureChat role,\"AN trauma ap/residents\", with any questions/concerns.    24 Hour Events : Dissipating placement at rehab today.  Subjective : Patient alert and resting comfortably in bed.  She stated that her pain was controlled through the night and she got a good night of sleep.  Has no questions or concerns at this " "time.    Objective :  Temp:  [97.6 °F (36.4 °C)-98.3 °F (36.8 °C)] 97.6 °F (36.4 °C)  HR:  [60-65] 60  BP: (102-127)/(44-57) 104/48  Resp:  [16-18] 18  SpO2:  [94 %-98 %] 97 %    I/O         07/01 0701 07/02 0700 07/02 0701 07/03 0700 07/03 0701  07/04 0700    P.O. 240      Total Intake(mL/kg) 240 (2.8)      Urine (mL/kg/hr) 250 (0.1) 1085 (0.5)     Total Output 250 1085     Net -10 -1085                    Physical Exam  Vitals and nursing note reviewed.   Constitutional:       General: She is not in acute distress.     Appearance: She is well-developed.   HENT:      Head: Normocephalic.      Comments: Improving ecchymosis present over upper lip and lower face.    Eyes:      Conjunctiva/sclera: Conjunctivae normal.       Cardiovascular:      Rate and Rhythm: Normal rate and regular rhythm.      Heart sounds: No murmur heard.  Pulmonary:      Effort: Pulmonary effort is normal. No respiratory distress.      Breath sounds: Normal breath sounds.   Abdominal:      Palpations: Abdomen is soft.      Tenderness: There is no abdominal tenderness.     Musculoskeletal:         General: No swelling.      Cervical back: Neck supple.     Skin:     General: Skin is warm and dry.      Capillary Refill: Capillary refill takes less than 2 seconds.     Neurological:      Mental Status: She is alert.     Psychiatric:         Mood and Affect: Mood normal.               Lab Results: I have reviewed the following results:  No results for input(s): \"WBC\", \"HGB\", \"HCT\", \"PLT\", \"BANDSPCT\", \"SODIUM\", \"K\", \"CL\", \"CO2\", \"BUN\", \"CREATININE\", \"GLUC\", \"CAIONIZED\", \"MG\", \"PHOS\", \"AST\", \"ALT\", \"ALB\", \"TBILI\", \"DBILI\", \"ALKPHOS\", \"PTT\", \"INR\", \"HSTNI0\", \"HSTNI2\", \"BNP\", \"LACTICACID\" in the last 72 hours.    Imaging Results Review: No pertinent imaging studies reviewed.  Other Study Results Review: No additional pertinent studies reviewed.  "

## 2025-07-04 VITALS
DIASTOLIC BLOOD PRESSURE: 62 MMHG | HEART RATE: 72 BPM | DIASTOLIC BLOOD PRESSURE: 62 MMHG | OXYGEN SATURATION: 96 % | OXYGEN SATURATION: 96 % | SYSTOLIC BLOOD PRESSURE: 124 MMHG | SYSTOLIC BLOOD PRESSURE: 124 MMHG | BODY MASS INDEX: 30.96 KG/M2 | TEMPERATURE: 97.6 F | RESPIRATION RATE: 16 BRPM | WEIGHT: 185.85 LBS | BODY MASS INDEX: 30.96 KG/M2 | HEART RATE: 72 BPM | TEMPERATURE: 97.6 F | HEIGHT: 65 IN | HEIGHT: 65 IN | WEIGHT: 185.85 LBS | RESPIRATION RATE: 16 BRPM

## 2025-07-04 PROCEDURE — NC001 PR NO CHARGE: Performed by: SURGERY

## 2025-07-04 PROCEDURE — 99239 HOSP IP/OBS DSCHRG MGMT >30: CPT | Performed by: SURGERY

## 2025-07-04 RX ADMIN — ACETAMINOPHEN 975 MG: 325 TABLET ORAL at 10:47

## 2025-07-04 RX ADMIN — ENOXAPARIN SODIUM 30 MG: 30 INJECTION SUBCUTANEOUS at 10:46

## 2025-07-04 RX ADMIN — LEVETIRACETAM 500 MG: 500 TABLET, FILM COATED ORAL at 10:47

## 2025-07-04 RX ADMIN — CARVEDILOL 25 MG: 12.5 TABLET, FILM COATED ORAL at 10:46

## 2025-07-04 RX ADMIN — AMIODARONE HYDROCHLORIDE 100 MG: 200 TABLET ORAL at 10:47

## 2025-07-04 NOTE — CASE MANAGEMENT
Case Management Progress Note    Patient name Alice Mclean S /S -01 MRN [id number]  : 1930 Date 2025       LOS (days): 5  Geometric Mean LOS (GMLOS) (days): 3  Days to GMLOS:-2.2        OBJECTIVE:        Current admission status: Inpatient  Preferred Pharmacy:   [facility] [place], [place] - [address]  [address] [place] [address]  Phone: [phone] Fax: [phone]    Primary Care Provider: Simi Elizabeth    Primary Insurance: MEDICARE  Secondary Insurance:     PROGRESS NOTE:    Cm followed up with [facility] to discuss patients return due to appeal being denied. CM left  requesting call back.

## 2025-07-04 NOTE — CASE MANAGEMENT
Case Management Progress Note    Patient name Alice Mclean S /S -01 MRN [id number]  : 1930 Date 2025       LOS (days): 5  Geometric Mean LOS (GMLOS) (days): 3  Days to GMLOS:-2.2        OBJECTIVE:        Current admission status: Inpatient  Preferred Pharmacy:   [facility] [place], [place] - [address]  [address] [address]  Phone: [phone] Fax: [phone]    Primary Care Provider: Simi Elizabeth    Primary Insurance: MEDICARE  Secondary Insurance:     PROGRESS NOTE:    Cm advised family in room and requesting to speak to .    Cm met with patient, son, and daughter in law in room to discuss dc planning. Cm advised Cm reached out to [facility] without any response. Son tried to call facility while Cm in room with no response either.     Cm requesting 12pm transport to get transport secured while CM and son attempt to contact facility. Cm received confirmed transport time of 3:20. CM called facility again and was able to speak with Lelia. Lelia confirming patient can return today. Cm advised of requested 12pm transport. Lelia requesting AVS be faxed to - [phone].    Cm met with family in room to advise that Cm was able to get in contact with Lelia and patient clear to return. Cm advised transport confirmed for 3:20. Family decided to transport patient back themselves. Transport cancelled and AVS faxed to [facility] as per Lelia's request. Rn and provider updated.

## 2025-07-04 NOTE — DISCHARGE SUMMARY
"Discharge Summary - Trauma   Name: Alice Rangel 89 y.o. female I MRN: [id number]  Unit/Bed#: S -01 I Date of Admission: 6/29/2025   Date of Service: 7/4/2025 I Hospital Day: 5    Admission Date: 6/29/2025 0436  Discharge Date: 07/04/25  Admitting Diagnosis: Multiple injuries [T07.XXXA]  SAH (subarachnoid hemorrhage) ([facility]) [I60.9]  Discharge Diagnosis:   Medical Problems       Resolved Problems  Date Reviewed: 6/29/2025          Resolved    Urinary tract infection 7/3/2025     Resolved by  Lilo An MD          HPI: per Zakia Wagoner:  \"Alice Rangel is a 89 y.o. 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 She reports immediate bleeding from her mouth and nose. She did not lose consciousness. She complains of pain in her right hand at the site of two skin tears. She denies headache, neck pain, chest pain, shortness of breath, abdominal pain, nausea, vomiting, or other extremity pain. \"    Procedures Performed:   Orders Placed This Encounter   Procedures    Fast Ultrasound    Laceration repair       Summary of Hospital Course: 89 y/o female admitted after a mechanical fall landing flat on her face.  Immediate blood from nose and mouth per patient.  Right hand suffered skin tears, no other issues.  Patient being discharged to rehab today.  Doing well and pain controlled.  Will follow up with Trauma and PCP.  For more details please refer to medical records.    Significant Findings, Care, Treatment and Services Provided: XR hand 3+ vw right  Result Date: 6/30/2025  Impression: No acute osseous abnormality. Computerized Assisted Algorithm (CAA) may have been used to analyze all applicable images. Workstation performed: [id number]     XR Trauma multiple (SLB/SLRA trauma bay ONLY)  Result Date: 6/30/2025  Impression: Bilateral peribronchial wall thickening suspicious for an infectious or inflammatory " bronchiolitis. Mild pulmonary vascular congestion. Cardiomegaly. Computerized Assisted Algorithm (CAA) may have been used to analyze all applicable images. Workstation performed: [id number]     CT head wo contrast  Result Date: 6/30/2025  Impression: Stable trace left parafalcine subdural hematoma, with stable trace subarachnoid hemorrhage along the parasagittal left frontal lobe. Right frontal scalp hematoma. Workstation performed: [id number]     XR chest 1 view  Result Date: 6/29/2025  Impression: Bilateral peribronchial wall thickening suspicious for an infectious or inflammatory bronchiolitis. Mild pulmonary vascular congestion. Cardiomegaly. Computerized Assisted Algorithm (CAA) may have been used to analyze all applicable images. Workstation performed: [id number]     TRAUMA - CT chest abdomen pelvis w contrast  Result Date: 6/29/2025  Impression: No findings of acute traumatic injury in the chest, abdomen or pelvis. Mild pulmonary vascular congestion. Trace right pleural fluid. No pneumothorax. No acute intra-abdominal abnormality. No free air or free fluid. Scattered colonic diverticulosis with no inflammatory changes present to suggest acute diverticulitis. Chronic appearing compression deformities of the T1, T2, T3, L5 vertebral bodies. Computerized Assisted Algorithm (CAA) may have aided analysis of applicable images. Workstation performed: [id number]     TRAUMA - CT spine cervical wo contrast  Result Date: 6/29/2025  Impression: No acute cervical spine fracture or traumatic malalignment. Workstation performed: [id number]     TRAUMA - CT head wo contrast  Result Date: 6/29/2025  Impression: Tiny amount of acute interhemispheric subdural hemorrhage. Tiny amount of left paramedian frontal acute subarachnoid hemorrhage. No mass effect or midline shift. I personally discussed this study with DULCE TOMPKINS on 6/29/2025 5:10 AM. Workstation performed: [id number]         Complications: none    Condition at  Discharge: stable       Discharge instructions/Information to patient and family:   See After Visit Summary (AVS) for information provided to patient and family.      Provisions for Follow-Up Care:  See after visit summary for information related to follow-up care and any pertinent home health orders.      PCP: Simi Elizabeth    Disposition: [facility]    Planned Readmission: No     Discharge Medications:  See after visit summary for reconciled discharge medications provided to patient and family.      Discharge Statement:  I have spent a total time of 30 minutes in caring for this patient on the day of the visit/encounter. .

## 2025-07-04 NOTE — DISCHARGE INSTR - OTHER ORDERS
Please change hand dressing every 24 hours.  May moisten to remove dressing if needed.  Apply xeroform over abrasions and then 4X4 gauze and wrap with antolin.

## 2025-07-04 NOTE — PLAN OF CARE
Problem: PAIN - ADULT  Goal: Verbalizes/displays adequate comfort level or baseline comfort level  Description: Interventions:  - Encourage patient to monitor pain and request assistance  - Assess pain using appropriate pain scale  - Administer analgesics as ordered based on type and severity of pain and evaluate response  - Implement non-pharmacological measures as appropriate and evaluate response  - Consider cultural and social influences on pain and pain management  - Notify physician/advanced practitioner if interventions unsuccessful or patient reports new pain  - Educate patient/family on pain management process including their role and importance of  reporting pain   - Provide non-pharmacologic/complimentary pain relief interventions  Outcome: Progressing     Problem: INFECTION - ADULT  Goal: Absence or prevention of progression during hospitalization  Description: INTERVENTIONS:  - Assess and monitor for signs and symptoms of infection  - Monitor lab/diagnostic results  - Monitor all insertion sites, i.e. indwelling lines, tubes, and drains  - Monitor endotracheal if appropriate and nasal secretions for changes in amount and color  - [place] appropriate cooling/warming therapies per order  - Administer medications as ordered  - Instruct and encourage patient and family to use good hand hygiene technique  - Identify and instruct in appropriate isolation precautions for identified infection/condition  Outcome: Progressing  Goal: Absence of fever/infection during neutropenic period  Description: INTERVENTIONS:  - Monitor WBC  - Perform strict hand hygiene  - Limit to healthy visitors only  - No plants, dried, fresh or silk flowers with mariscal in patient room  Outcome: Progressing     Problem: SAFETY ADULT  Goal: Patient will remain free of falls  Description: INTERVENTIONS:  - Educate patient/family on patient safety including physical limitations  - Instruct patient to call for assistance with activity   -  Consider consulting OT/PT to assist with strengthening/mobility based on AM PAC & JH-HLM score  - Consult OT/PT to assist with strengthening/mobility   - Keep Call bell within reach  - Keep bed low and locked with side rails adjusted as appropriate  - Keep care items and personal belongings within reach  - Initiate and maintain comfort rounds  - Make Fall Risk Sign visible to staff  - Apply yellow socks and bracelet for high fall risk patients  - Consider moving patient to room near nurses station  Outcome: Progressing  Goal: Maintain or return to baseline ADL function  Description: INTERVENTIONS:  -  Assess patient's ability to carry out ADLs; assess patient's baseline for ADL function and identify physical deficits which impact ability to perform ADLs (bathing, care of mouth/teeth, toileting, grooming, dressing, etc.)  - Assess/evaluate cause of self-care deficits   - Assess range of motion  - Assess patient's mobility; develop plan if impaired  - Assess patient's need for assistive devices and provide as appropriate  - Encourage maximum independence but intervene and supervise when necessary  - Involve family in performance of ADLs  - Assess for home care needs following discharge   - Consider OT consult to assist with ADL evaluation and planning for discharge  - Provide patient education as appropriate  - Monitor functional capacity and physical performance, use of AM PAC & JH-HLM   - Monitor gait, balance and fatigue with ambulation    Outcome: Progressing  Goal: Maintains/Returns to pre admission functional level  Description: INTERVENTIONS:  - Perform AM-PAC 6 Click Basic Mobility/ Daily Activity assessment daily.  - Set and communicate daily mobility goal to care team and patient/family/caregiver.   - Collaborate with rehabilitation services on mobility goals if consulted  - Out of bed for toileting  - Record patient progress and toleration of activity level   Outcome: Progressing     Problem: DISCHARGE  PLANNING  Goal: Discharge to home or other facility with appropriate resources  Description: INTERVENTIONS:  - Identify barriers to discharge w/patient and caregiver  - Arrange for needed discharge resources and transportation as appropriate  - Identify discharge learning needs (meds, wound care, etc.)  - Arrange for interpretive services to assist at discharge as needed  - Refer to Case Management Department for coordinating discharge planning if the patient needs post-hospital services based on physician/advanced practitioner order or complex needs related to functional status, cognitive ability, or social support system  Outcome: Progressing     Problem: Knowledge Deficit  Goal: Patient/family/caregiver demonstrates understanding of disease process, treatment plan, medications, and discharge instructions  Description: Complete learning assessment and assess knowledge base.  Interventions:  - Provide teaching at level of understanding  - Provide teaching via preferred learning methods  Outcome: Progressing     Problem: Nutrition/Hydration-ADULT  Goal: Nutrient/Hydration intake appropriate for improving, restoring or maintaining nutritional needs  Description: Monitor and assess patient's nutrition/hydration status for malnutrition. Collaborate with interdisciplinary team and initiate plan and interventions as ordered.  Monitor patient's weight and dietary intake as ordered or per policy. Utilize nutrition screening tool and intervene as necessary. Determine patient's food preferences and provide high-protein, high-caloric foods as appropriate.     INTERVENTIONS:  - Monitor oral intake, urinary output, labs, and treatment plans  - Assess nutrition and hydration status and recommend course of action  - Evaluate amount of meals eaten  - Assist patient with eating if necessary   - Allow adequate time for meals  - Recommend/ encourage appropriate diets, oral nutritional supplements, and vitamin/mineral supplements  -  Order, calculate, and assess calorie counts as needed  - Recommend, monitor, and adjust tube feedings and TPN/PPN based on assessed needs  - Assess need for intravenous fluids  - Provide specific nutrition/hydration education as appropriate  - Include patient/family/caregiver in decisions related to nutrition  Outcome: Progressing

## 2025-07-04 NOTE — PROGRESS NOTES
"Progress Note - Trauma   Name: Alice Rangel 89 y.o. female I MRN: [id number]  Unit/Bed#: S -01 I Date of Admission: 6/29/2025   Date of Service: 7/4/2025 I Hospital Day: 5    Assessment & Plan  Fall  - Status post fall with the below noted injuries.  - Fall precautions.    SDH (subdural hematoma) (HCC)  SAH (subarachnoid hemorrhage) (HCC)  - Neuro exam: GCS 15, non-focal  - Continue neurologic checks: Every 4 hours.  - Reversal agent administered: K-Centra  - CT scan of the head on 6/29 reviewed: tiny interhemispheric SDH, tiny left paramedian frontal SAH  - Repeat CT scan of the head in the evening of 6/29 with stable SAH/SDH   - Appreciate Neurosurgery evaluation and recommendations.  - Complete 7 day course of Keppra for seizure prophylaxis  - Chemical DVT prophylaxis: Lovenox  - Hold all anticoagulants and anti platelet medications for 2 weeks and/or until cleared by Neurosurgery to resume.  - PT and OT (including cognitive evaluation) evaluation and treatment as indicated.  Lip laceration  - Upper buccal lip laceration with active bleeding  - Improved with Surgicel and ice and chromic suture  - Continue local wound care  Skin tear of hand without complication  - Right dorsal hand skin tear x 2   - Local wound care  - Ace wrap for compression with adequate hemostasis   - continue local wound care with dressing change every 4 days.     Bowel Regimen: MiraLAX and senna  VTE Prophylaxis:Heparin     Disposition: Anticipating rehab placement today pending case management Please contact the Secure[employer] role,\"AN Trauma PA/Resident\", with any questions/concerns.    24 Hour Events : Family informed that they will be transporting the patient back to skilled nursing facility.  Subjective : Alert and resting comfortably in bed.  Patient states she slept comfortably through the night and has no concerns at this time.  She states that her pain has been well-controlled.    Objective :  Temp:  [97.5 °F (36.4 °C)-97.8 °F " "(36.6 °C)] 97.6 °F (36.4 °C)  HR:  [60-72] 72  BP: (108-142)/(45-71) 136/61  Resp:  [16] 16  SpO2:  [94 %-97 %] 97 %    I/O         07/02 0701  07/03 0700 07/03 0701  07/04 0700 07/04 0701  07/05 0700    P.O.  420     Total Intake(mL/kg)  420 (5)     Urine (mL/kg/hr) 1085 (0.5) 1275 (0.6)     Total Output 1085 1275     Net -1085 -855                    Physical Exam  Vitals and nursing note reviewed.   Constitutional:       General: She is not in acute distress.     Appearance: She is well-developed.   HENT:      Head: Normocephalic.      Comments: Improving swelling and erythema over upper lip and anterior neck.    Eyes:      Conjunctiva/sclera: Conjunctivae normal.       Cardiovascular:      Rate and Rhythm: Normal rate and regular rhythm.      Heart sounds: No murmur heard.  Pulmonary:      Effort: Pulmonary effort is normal. No respiratory distress.      Breath sounds: Normal breath sounds.   Abdominal:      Palpations: Abdomen is soft.      Tenderness: There is no abdominal tenderness.     Musculoskeletal:         General: No swelling.      Cervical back: Neck supple.     Skin:     General: Skin is warm and dry.      Capillary Refill: Capillary refill takes less than 2 seconds.     Neurological:      Mental Status: She is alert.     Psychiatric:         Mood and Affect: Mood normal.               Lab Results: I have reviewed the following results:  No results for input(s): \"WBC\", \"HGB\", \"HCT\", \"PLT\", \"BANDSPCT\", \"SODIUM\", \"K\", \"CL\", \"CO2\", \"BUN\", \"CREATININE\", \"GLUC\", \"CAIONIZED\", \"MG\", \"PHOS\", \"AST\", \"ALT\", \"ALB\", \"TBILI\", \"DBILI\", \"ALKPHOS\", \"PTT\", \"INR\", \"HSTNI0\", \"HSTNI2\", \"BNP\", \"LACTICACID\" in the last 72 hours.    Imaging Results Review: No pertinent imaging studies reviewed.  Other Study Results Review: No additional pertinent studies reviewed.  "

## 2025-07-05 NOTE — DISCHARGE SUPPORT
[employer] portal was not responding. Call [employer] for status update of appeal. P: [phone] and left message for call back. CM notified: Darlin CASTILLO

## 2025-07-05 NOTE — DISCHARGE SUPPORT
Case Management Assessment & Discharge Planning Note    Patient name Alice Rangel  Location S /S -01 MRN [id number]  : 1930 Date 2025       Current Admission Date: 2025  Current Admission Diagnosis:SDH (subdural hematoma) (HCC)   Patient Active Problem List    Diagnosis Date Noted    Cognitive impairment 2025    At risk for delirium 2025    Anemia 2025    Fall 2025    Lip laceration 2025    Skin tear of hand without complication 2025    SAH (subarachnoid hemorrhage) (HCC) 2025    SDH (subdural hematoma) ([facility]) 2025      LOS (days): 5  Geometric Mean LOS (GMLOS) (days): 3  Days to GMLOS:-2.2   [employer] Appeal Determination  Determination: Medical Director agrees with term of services  Liable for payment: Beneficiary  Date Liability Starts: 25   Notified: Darlin CASTILLO       Please reach out to CM for updates on any clinical information.

## 2025-07-06 ENCOUNTER — HOSPITAL ENCOUNTER (EMERGENCY)
Facility: HOSPITAL | Age: OVER 89
Discharge: HOME | End: 2025-07-06
Attending: EMERGENCY MEDICINE | Admitting: EMERGENCY MEDICINE
Payer: MEDICARE

## 2025-07-06 VITALS
SYSTOLIC BLOOD PRESSURE: 146 MMHG | DIASTOLIC BLOOD PRESSURE: 64 MMHG | WEIGHT: 146.16 LBS | TEMPERATURE: 97.6 F | HEART RATE: 76 BPM | BODY MASS INDEX: 25.09 KG/M2 | OXYGEN SATURATION: 96 % | RESPIRATION RATE: 16 BRPM

## 2025-07-06 DIAGNOSIS — N39.0 URINARY TRACT INFECTION: Primary | ICD-10-CM

## 2025-07-06 DIAGNOSIS — N17.9 AKI (ACUTE KIDNEY INJURY) (HCC): ICD-10-CM

## 2025-07-06 LAB
ANION GAP SERPL CALCULATED.3IONS-SCNC: 5 MMOL/L (ref 4–13)
BACTERIA UR QL AUTO: ABNORMAL /HPF
BASOPHILS # BLD AUTO: 0.03 THOUSANDS/ÂΜL (ref 0–0.1)
BASOPHILS NFR BLD AUTO: 0 % (ref 0–1)
BILIRUB UR QL STRIP: NEGATIVE
BUN SERPL-MCNC: 48 MG/DL (ref 5–25)
CALCIUM SERPL-MCNC: 8.6 MG/DL (ref 8.4–10.2)
CAOX CRY URNS QL MICRO: ABNORMAL /HPF
CHLORIDE SERPL-SCNC: 105 MMOL/L (ref 96–108)
CLARITY UR: ABNORMAL
CO2 SERPL-SCNC: 24 MMOL/L (ref 21–32)
COLOR UR: ABNORMAL
CREAT SERPL-MCNC: 1.77 MG/DL (ref 0.6–1.3)
EOSINOPHIL # BLD AUTO: 0.25 THOUSAND/ÂΜL (ref 0–0.61)
EOSINOPHIL NFR BLD AUTO: 4 % (ref 0–6)
ERYTHROCYTE [DISTWIDTH] IN BLOOD BY AUTOMATED COUNT: 15.8 % (ref 11.6–15.1)
GFR SERPL CREATININE-BSD FRML MDRD: 24 ML/MIN/1.73SQ M
GLUCOSE SERPL-MCNC: 119 MG/DL (ref 65–140)
GLUCOSE UR STRIP-MCNC: NEGATIVE MG/DL
HCT VFR BLD AUTO: 27.4 % (ref 34.8–46.1)
HGB BLD-MCNC: 8.3 G/DL (ref 11.5–15.4)
HGB UR QL STRIP.AUTO: ABNORMAL
IMM GRANULOCYTES # BLD AUTO: 0.03 THOUSAND/UL (ref 0–0.2)
IMM GRANULOCYTES NFR BLD AUTO: 0 % (ref 0–2)
KETONES UR STRIP-MCNC: NEGATIVE MG/DL
LEUKOCYTE ESTERASE UR QL STRIP: ABNORMAL
LYMPHOCYTES # BLD AUTO: 1.32 THOUSANDS/ÂΜL (ref 0.6–4.47)
LYMPHOCYTES NFR BLD AUTO: 18 % (ref 14–44)
MCH RBC QN AUTO: 27.9 PG (ref 26.8–34.3)
MCHC RBC AUTO-ENTMCNC: 30.3 G/DL (ref 31.4–37.4)
MCV RBC AUTO: 92 FL (ref 82–98)
MONOCYTES # BLD AUTO: 0.87 THOUSAND/ÂΜL (ref 0.17–1.22)
MONOCYTES NFR BLD AUTO: 12 % (ref 4–12)
NEUTROPHILS # BLD AUTO: 4.73 THOUSANDS/ÂΜL (ref 1.85–7.62)
NEUTS SEG NFR BLD AUTO: 66 % (ref 43–75)
NITRITE UR QL STRIP: NEGATIVE
NON-SQ EPI CELLS URNS QL MICRO: ABNORMAL /HPF
NRBC BLD AUTO-RTO: 0 /100 WBCS
PH UR STRIP.AUTO: 8 [PH]
PLATELET # BLD AUTO: 274 THOUSANDS/UL (ref 149–390)
PMV BLD AUTO: 8.7 FL (ref 8.9–12.7)
POTASSIUM SERPL-SCNC: 4.6 MMOL/L (ref 3.5–5.3)
PROT UR STRIP-MCNC: ABNORMAL MG/DL
RBC # BLD AUTO: 2.98 MILLION/UL (ref 3.81–5.12)
RBC #/AREA URNS AUTO: ABNORMAL /HPF
SODIUM SERPL-SCNC: 134 MMOL/L (ref 135–147)
SP GR UR STRIP.AUTO: 1.02 (ref 1–1.03)
UROBILINOGEN UR STRIP-ACNC: <2 MG/DL
WBC # BLD AUTO: 7.23 THOUSAND/UL (ref 4.31–10.16)
WBC #/AREA URNS AUTO: ABNORMAL /HPF

## 2025-07-06 PROCEDURE — 36415 COLL VENOUS BLD VENIPUNCTURE: CPT

## 2025-07-06 PROCEDURE — 87086 URINE CULTURE/COLONY COUNT: CPT

## 2025-07-06 PROCEDURE — 81001 URINALYSIS AUTO W/SCOPE: CPT

## 2025-07-06 PROCEDURE — 87186 SC STD MICRODIL/AGAR DIL: CPT

## 2025-07-06 PROCEDURE — 87077 CULTURE AEROBIC IDENTIFY: CPT

## 2025-07-06 PROCEDURE — 99284 EMERGENCY DEPT VISIT MOD MDM: CPT | Performed by: EMERGENCY MEDICINE

## 2025-07-06 PROCEDURE — 96365 THER/PROPH/DIAG IV INF INIT: CPT

## 2025-07-06 PROCEDURE — 80048 BASIC METABOLIC PNL TOTAL CA: CPT

## 2025-07-06 PROCEDURE — 99283 EMERGENCY DEPT VISIT LOW MDM: CPT

## 2025-07-06 PROCEDURE — 96361 HYDRATE IV INFUSION ADD-ON: CPT

## 2025-07-06 PROCEDURE — 85025 COMPLETE CBC W/AUTO DIFF WBC: CPT

## 2025-07-06 RX ORDER — CEFPODOXIME PROXETIL 100 MG/1
100 TABLET, FILM COATED ORAL 2 TIMES DAILY
Qty: 14 TABLET | Refills: 0 | Status: SHIPPED | OUTPATIENT
Start: 2025-07-06 | End: 2025-07-13

## 2025-07-06 RX ADMIN — SODIUM CHLORIDE 1000 ML: 0.9 INJECTION, SOLUTION INTRAVENOUS at 12:30

## 2025-07-06 RX ADMIN — CEFTRIAXONE 1000 MG: 10 INJECTION, POWDER, FOR SOLUTION INTRAVENOUS at 12:30

## 2025-07-06 NOTE — ED ATTENDING ATTESTATION
7/6/2025  IKourtney DO, saw and evaluated the patient. I have discussed the patient with the resident/non-physician practitioner and agree with the resident's/non-physician practitioner's findings, Plan of Care, and MDM as documented in the resident's/non-physician practitioner's note, except where noted. All available labs and Radiology studies were reviewed.  I was present for key portions of any procedure(s) performed by the resident/non-physician practitioner and I was immediately available to provide assistance.       At this point I agree with the current assessment done in the Emergency Department.  I have conducted an independent evaluation of this patient a history and physical is as follows:    89-year-old female presenting to the emergency department with son from [facility] for difficulty urinating, dysuria, urinary urgency that started yesterday afternoon.  She was seen in the emergency department 1 week ago as a trauma after a fall.  It appears patient has 2 separate charts with her name, she came in as a trauma 1 week ago and likely a new chart was created then.  I was able to look it up, asked them to be merged. Per the discharge note on 7/4/25 from trauma surgery, patient admitted after mechanical fall.  Patient had CT head that showed trace left parafalcine subdural hematoma with trace subarachnoid hemorrhage along the parasagittal left frontal lobe.  She was discharged to rehab on 7/4/25.    Patient has not had any further trauma.  She is alert and oriented x 4.  GCS 15.  Heart regular rate, no increased work of breathing, abdomen soft, mild suprapubic discomfort, no CVA/flank tenderness to palpation, extremities well-perfused.    Urine culture from 6/29/2025 grew E. coli which was pan susceptible.  Patient was started on Bactrim, finished a 3-day course.    She did not have urinary tract infection symptoms until yesterday afternoon however.  Potentially partially treated UTI.  Will  obtain blood work, UA, start on antibiotics.    UA is concerning for infection.  Patient given a dose of IV antibiotics in the emergency department.  Does also appear to have an acute kidney injury, most recent creatinine prior to today was 1.11 on 6/30/2025.  Patient given a liter of IV fluids.    Updated patient and son with results.  Provided prescription for antibiotics, advised to finish the entire course.  Discussed close follow-up with PCP for repeat blood work to check creatinine.  Discussed hydration.  Return precautions were discussed, they verbalized understanding and are in agreement with plan.    ED Course         Critical Care Time  Procedures

## 2025-07-06 NOTE — ED PROVIDER NOTES
Time reflects when diagnosis was documented in both MDM as applicable and the Disposition within this note       Time User Action Codes Description Comment    7/6/2025  2:09 PM Miroslava Qureshi Add [N39.0] Urinary tract infection     7/6/2025  2:17 PM Kourtney Burden Add [N17.9] VERNA (acute kidney injury) (HCC)           ED Disposition       ED Disposition   Discharge    Condition   Stable    Date/Time   Sun Jul 6, 2025  2:08 PM    Comment   Minerva Crockett discharge to home/self care.                   Assessment & Plan       Medical Decision Making  Patient presented with difficulty passing urine. Reports no urine since midday, dysuria and urinary frequency started yesterday. Denies fever/chills and has been eating well. Denies respiratory symptoms.   Recent admission in hospital following a fall, where patient was given short course of antibiotics for a urine infection (3 days).   She had some suprapubic tenderness on palpation, with no guarding. She had a bladder scan which ruled out urinary retention.   Bloods were remarkable for an VERNA, and observations were normal throughout her stay.   She received a stat dose of IV antibiotics and fluids. As she remained clinically well, she discharged with course of oral antibiotics and instructed to make contact with her PCP on Monday for follow up creatinine check. Patient was happy with plan.     Amount and/or Complexity of Data Reviewed  Labs: ordered.    Risk  Prescription drug management.             Medications   sodium chloride 0.9 % bolus 1,000 mL (0 mL Intravenous Stopped 7/6/25 1415)   ceftriaxone (ROCEPHIN) 1 g/50 mL in dextrose IVPB (0 mg Intravenous Stopped 7/6/25 1336)       ED Risk Strat Scores                    No data recorded                            History of Present Illness       Chief Complaint   Patient presents with    Urinary Retention     Pt was dc from here 7/4-difficulty urinating since. +burning during urination.       Past Medical History[1]   Past  Surgical History[2]   Family History[3]   Social History[4]   E-Cigarette/Vaping    E-Cigarette Use Never User       E-Cigarette/Vaping Substances    Nicotine No     THC No     CBD No     Flavoring No     Other No     Unknown No       I have reviewed and agree with the history as documented.     Dysuria, frequency urine since yesterday   Reports hasn't been able to pass urine since midday today   Hasn't noted any blood but hasn't particularly looked    Reports normally gets up to urinate 4 times a night (for about a year)  Recent admission to hospital following a fall in the night whilst going to the bathroom  Was treated for UTI during the admission (home on Friday)    Denies respiratory symptoms   Bowels opening normally     Denies fever  Eating and drinking well             Review of Systems   Genitourinary:  Positive for difficulty urinating, dysuria and frequency. Negative for flank pain.   All other systems reviewed and are negative.          Objective       ED Triage Vitals   Temperature Pulse Blood Pressure Respirations SpO2 Patient Position - Orthostatic VS   07/06/25 1012 07/06/25 1010 07/06/25 1010 07/06/25 1010 07/06/25 1010 07/06/25 1010   97.6 °F (36.4 °C) 61 115/56 16 97 % Sitting      Temp Source Heart Rate Source BP Location FiO2 (%) Pain Score    07/06/25 1012 07/06/25 1010 07/06/25 1010 -- --    Oral Monitor Left arm        Vitals      Date and Time Temp Pulse SpO2 Resp BP Pain Score FACES Pain Rating User   07/06/25 1419 -- 76 96 % 16 146/64 -- -- LD   07/06/25 1100 -- 60 96 % 16 105/53 -- -- LD   07/06/25 1030 -- 60 96 % 16 104/51 -- -- LD   07/06/25 1012 97.6 °F (36.4 °C) -- -- -- -- -- -- LD   07/06/25 1010 -- 61 97 % 16 115/56 -- -- LD            Physical Exam  Vitals reviewed.     Cardiovascular:      Rate and Rhythm: Normal rate and regular rhythm.      Heart sounds: Normal heart sounds.   Pulmonary:      Effort: Pulmonary effort is normal.      Breath sounds: Normal breath sounds.    Abdominal:      General: Abdomen is flat.      Tenderness: There is abdominal tenderness. There is no guarding.       Abdomen soft, slightly tender suprapubic LIF  No guarding   Bowel sounds normal         Results Reviewed       Procedure Component Value Units Date/Time    Urine Microscopic [[id number]]  (Abnormal) Collected: 07/06/25 1146    Lab Status: Final result Specimen: Urine, Clean Catch Updated: 07/06/25 1244     RBC, UA 20-30 /hpf      WBC, UA Innumerable /hpf      Epithelial Cells None Seen /hpf      Bacteria, UA Occasional /hpf      Ca Oxalate Mare, UA Occasional /hpf     Urine culture [[id number]] Collected: 07/06/25 1146    Lab Status: In process Specimen: Urine, Clean Catch Updated: 07/06/25 1244    Basic metabolic panel [[id number]]  (Abnormal) Collected: 07/06/25 1135    Lab Status: Final result Specimen: Blood from Arm, Left Updated: 07/06/25 1158     Sodium 134 mmol/L      Potassium 4.6 mmol/L      Chloride 105 mmol/L      CO2 24 mmol/L      ANION GAP 5 mmol/L      BUN 48 mg/dL      Creatinine 1.77 mg/dL      Glucose 119 mg/dL      Calcium 8.6 mg/dL      eGFR 24 ml/min/1.73sq m     Narrative:      National Kidney Disease Foundation guidelines for Chronic Kidney Disease (CKD):     Stage 1 with normal or high GFR (GFR > 90 mL/min/1.73 square meters)    Stage 2 Mild CKD (GFR = 60-89 mL/min/1.73 square meters)    Stage 3A Moderate CKD (GFR = 45-59 mL/min/1.73 square meters)    Stage 3B Moderate CKD (GFR = 30-44 mL/min/1.73 square meters)    Stage 4 Severe CKD (GFR = 15-29 mL/min/1.73 square meters)    Stage 5 End Stage CKD (GFR <15 mL/min/1.73 square meters)  Note: GFR calculation is accurate only with a steady state creatinine    UA w Reflex to Microscopic w Reflex to Culture [[id number]]  (Abnormal) Collected: 07/06/25 1146    Lab Status: Final result Specimen: Urine, Clean Catch Updated: 07/06/25 1155     Color, UA Brown     Clarity, UA Extra Turbid     Specific Gravity, UA 1.020     pH, UA 8.0      Leukocytes, UA Large     Nitrite, UA Negative     Protein,  (2+) mg/dl      Glucose, UA Negative mg/dl      Ketones, UA Negative mg/dl      Urobilinogen, UA <2.0 mg/dl      Bilirubin, UA Negative     Occult Blood, UA Small    CBC and differential [[id number]]  (Abnormal) Collected: 07/06/25 1135    Lab Status: Final result Specimen: Blood from Arm, Left Updated: 07/06/25 1151     WBC 7.23 Thousand/uL      RBC 2.98 Million/uL      Hemoglobin 8.3 g/dL      Hematocrit 27.4 %      MCV 92 fL      MCH 27.9 pg      MCHC 30.3 g/dL      RDW 15.8 %      MPV 8.7 fL      Platelets 274 Thousands/uL      nRBC 0 /100 WBCs      Segmented % 66 %      Immature Grans % 0 %      Lymphocytes % 18 %      Monocytes % 12 %      Eosinophils Relative 4 %      Basophils Relative 0 %      Absolute Neutrophils 4.73 Thousands/µL      Absolute Immature Grans 0.03 Thousand/uL      Absolute Lymphocytes 1.32 Thousands/µL      Absolute Monocytes 0.87 Thousand/µL      Eosinophils Absolute 0.25 Thousand/µL      Basophils Absolute 0.03 Thousands/µL             No orders to display       Procedures    ED Medication and Procedure Management   Prior to Admission Medications   Prescriptions Last Dose Informant Patient Reported? Taking?   Cholecalciferol (Vitamin D-3) 25 MCG (1000 UT) CAPS  Outside Facility (Specify) Yes No   Sig: Take by mouth daily   Cyanocobalamin (Vitamin B 12) 500 MCG TABS  Outside Facility (Specify) Yes No   Sig: Take by mouth   D3-1000 25 MCG (1000 UT) tablet  Outside Facility (Specify) No No   Sig: TAKE 1 TABLET ORALLY DAILY (VITAMIN D DEFICIENCY) *REORDER*   Patient not taking: Reported on 1/9/2025   Xarelto 15 MG tablet  Outside Facility (Specify) Yes No   Sig: Take 15 mg by mouth daily   acetaminophen (TYLENOL) 325 mg tablet  Outside Facility (Specify) No No   Sig: TAKE 2 TABLETS (650MG) ORALLY EVERY 8 HOURS AS NEEDED FOR PAIN *NOT TO EXCEED 3GM APAP/24HR*   amiodarone 100 mg tablet  Outside Facility (Specify) Yes No   Sig:  Take 100 mg by mouth daily   carvedilol (COREG) 25 mg tablet  Outside Facility (Specify) Yes No   Sig: Take 25 mg by mouth 2 (two) times a day with meals   clindamycin (CLEOCIN) 300 MG capsule  Outside Facility (Specify) Yes No   losartan (COZAAR) 50 mg tablet  Outside Facility (Specify) No No   Sig: TAKE 1 TABLET ORALLY DAILY (HYPERTENSION) *REORDER*      Facility-Administered Medications: None     Discharge Medication List as of 7/6/2025  2:39 PM        START taking these medications    Details   cefpodoxime (VANTIN) 100 mg tablet Take 1 tablet (100 mg total) by mouth 2 (two) times a day for 7 days, Starting Sun 7/6/2025, Until Sun 7/13/2025, Normal           CONTINUE these medications which have NOT CHANGED    Details   acetaminophen (TYLENOL) 325 mg tablet TAKE 2 TABLETS (650MG) ORALLY EVERY 8 HOURS AS NEEDED FOR PAIN *NOT TO EXCEED 3GM APAP/24HR*, Normal      amiodarone 100 mg tablet Take 100 mg by mouth daily, Starting Thu 4/13/2023, Historical Med      carvedilol (COREG) 25 mg tablet Take 25 mg by mouth 2 (two) times a day with meals, Starting Mon 3/28/2022, Historical Med      Cholecalciferol (Vitamin D-3) 25 MCG (1000 UT) CAPS Take by mouth daily, Historical Med      clindamycin (CLEOCIN) 300 MG capsule Historical Med      Cyanocobalamin (Vitamin B 12) 500 MCG TABS Take by mouth, Historical Med      D3-1000 25 MCG (1000 UT) tablet TAKE 1 TABLET ORALLY DAILY (VITAMIN D DEFICIENCY) *REORDER*, Normal      losartan (COZAAR) 50 mg tablet TAKE 1 TABLET ORALLY DAILY (HYPERTENSION) *REORDER*, Normal      Xarelto 15 MG tablet Take 15 mg by mouth daily, Starting Fri 9/9/2022, Historical Med           No discharge procedures on file.  ED SEPSIS DOCUMENTATION   Time reflects when diagnosis was documented in both MDM as applicable and the Disposition within this note       Time User Action Codes Description Comment    7/6/2025  2:09 PM Miroslava Qureshi Add [N39.0] Urinary tract infection     7/6/2025  2:17 PM Bertram  Bilal Add [N17.9] VERNA (acute kidney injury) (HCC)                      [1]   Past Medical History:  Diagnosis Date    Essential hypertension     LAST ASSESSED: 4/27/15    Malignant neoplasm of breast (HCC)     RIGHT; LAST ASSESSED: 4/27/15    Osteoporosis     LAST ASSESSED: 4/27/15    Pacemaker 04/15/2021   [2]   Past Surgical History:  Procedure Laterality Date    BLADDER SURGERY      BREAST LUMPECTOMY      LAST ASSESSED: 4/27/15    HIP SURGERY Right 04/16/2021    TONSILLECTOMY     [3]   Family History  Problem Relation Name Age of Onset    Stroke Mother          CEREBROVASCULAR ACCIDENT    Hypertension Mother      Ovarian cancer Mother      Pancreatic cancer Mother          PANCREAS CARCINOMA    Pancreatic cancer Brother          PANCREAS CARCINOMA    Bipolar disorder Other MATERNAL RELATIVES     Substance Abuse Neg Hx      Mental illness Neg Hx     [4]   Social History  Tobacco Use    Smoking status: Never    Smokeless tobacco: Never   Vaping Use    Vaping status: Never Used   Substance Use Topics    Alcohol use: No     Comment: ALCOHOL USE: WINE ON OCCASION, OCCASIONAL ALCOHOL USE AS PER [facility]CRIPTS    Drug use: No        Miroslava Qureshi MD  07/06/25 2100

## 2025-07-08 LAB — BACTERIA UR CULT: ABNORMAL

## 2025-07-14 ENCOUNTER — HOSPITAL ENCOUNTER (OUTPATIENT)
Dept: CT IMAGING | Facility: HOSPITAL | Age: OVER 89
Discharge: HOME | End: 2025-07-14
Attending: PHYSICIAN ASSISTANT
Payer: MEDICARE

## 2025-07-14 DIAGNOSIS — S06.5XAA SDH (SUBDURAL HEMATOMA) (HCC): ICD-10-CM

## 2025-07-14 PROCEDURE — 70450 CT HEAD/BRAIN W/O DYE: CPT

## 2025-07-17 ENCOUNTER — OFFICE VISIT (OUTPATIENT)
Dept: NEUROSURGERY | Facility: CLINIC | Age: OVER 89
End: 2025-07-17
Payer: MEDICARE

## 2025-07-17 VITALS
DIASTOLIC BLOOD PRESSURE: 62 MMHG | BODY MASS INDEX: 24.32 KG/M2 | HEART RATE: 64 BPM | SYSTOLIC BLOOD PRESSURE: 100 MMHG | OXYGEN SATURATION: 98 % | HEIGHT: 65 IN | TEMPERATURE: 97.9 F | WEIGHT: 146 LBS

## 2025-07-17 DIAGNOSIS — S06.5XAA SUBDURAL HEMATOMA (HCC): Primary | ICD-10-CM

## 2025-07-17 PROCEDURE — 99214 OFFICE O/P EST MOD 30 MIN: CPT | Performed by: PHYSICIAN ASSISTANT

## 2025-07-17 RX ORDER — FUROSEMIDE 20 MG/1
TABLET ORAL
COMMUNITY
Start: 2025-04-14

## 2025-07-17 NOTE — PROGRESS NOTES
Name: Minerva Crockett      : 1930      MRN: [id number]  Encounter Provider: Michelle Hernandez PA-C  Encounter Date: 2025   Encounter department: [facility] [place]  :  Assessment & Plan  Subdural hematoma (HCC)  Patient presents for 2-week follow-up for subdural hematoma status post fall.    Imaging reviewed personally.  Final results below discussed with the patient.  CT head without contrast 2025: Nearly resolved trace parafalcine subdural hematoma.  No new acute intracranial abnormality.    Plan  Given near resolution of the trace subdural hematoma, no neurosurgical intervention is anticipated at this time.  Patient may resume Xarelto, however recommend risk versus benefit discussion with PCP versus cardiology.  Further follow-up with neurosurgery on appearing basis.           History of Present Illness     Minerva Crockett is a 89 y.o. female   Who presents for approximately 2-week follow-up for subdural hematoma status post fall.  Patient was previously on Xarelto that has been held since the fall head bleed.    Patient reports that she is doing well.  Denies any headache, dizziness, lightheadedness, nausea or vomiting.  Patient denies new numbness, tingling, or weakness in bilateral upper and lower extremities.           Review of Systems   Constitutional: Negative.    HENT: Negative.     Eyes: Negative.    Respiratory: Negative.     Cardiovascular: Negative.    Gastrointestinal: Negative.    Endocrine: Negative.    Genitourinary: Negative.    Musculoskeletal: Negative.    Skin: Negative.    Allergic/Immunologic: Negative.    Neurological: Negative.         2 wk  Hsp f/u on SD[facility]-- CTH on 25   S/p trip/fall on 25    Patient doing well   Wants to know hen to start Xarelto  Xarelto ( ON HOLD) / non-smoker/ has Pacemaker     Hematological: Negative.    Psychiatric/Behavioral: Negative.     All other systems reviewed and are negative.    I have personally reviewed the MA's  review of systems and made changes as necessary.    Past Medical History   Past Medical History[1]  Past Surgical History[2]  Family History[3]  she reports that she has never smoked. She has never used smokeless tobacco. She reports that she does not drink alcohol and does not use drugs.  Current Outpatient Medications   Medication Instructions    acetaminophen (TYLENOL) 325 mg tablet TAKE 2 TABLETS (650MG) ORALLY EVERY 8 HOURS AS NEEDED FOR PAIN *NOT TO EXCEED 3GM APAP/24HR*    amiodarone 100 mg, Daily    amiodarone 100 mg, Daily    carvedilol (COREG) 25 mg, 2 times daily with meals    carvedilol (COREG) 25 mg, 2 times daily with meals    Cholecalciferol (Vitamin D-3) 25 MCG (1000 UT) CAPS Daily    clindamycin (CLEOCIN) 300 MG capsule     Cyanocobalamin (Vitamin B 12) 500 MCG TABS Take by mouth    D3-1000 25 MCG (1000 UT) tablet TAKE 1 TABLET ORALLY DAILY (VITAMIN D DEFICIENCY) *REORDER*    levETIRAcetam (KEPPRA) 500 mg, Oral, 2 times daily    losartan (COZAAR) 50 mg tablet TAKE 1 TABLET ORALLY DAILY (HYPERTENSION) *REORDER*    losartan (COZAAR) 50 mg, Daily    [Paused] rivaroxaban (XARELTO) 15 mg, Daily with breakfast    Xarelto 15 mg, Daily   Allergies[4]   Objective   There were no vitals taken for this visit.    Physical Exam  Constitutional:       General: She is not in acute distress.     Appearance: She is well-developed.   HENT:      Head: Normocephalic and atraumatic.     Eyes:      Extraocular Movements: Extraocular movements intact.      Pupils: Pupils are equal, round, and reactive to light.     Neck:      Trachea: No tracheal deviation.     Cardiovascular:      Rate and Rhythm: Normal rate.   Pulmonary:      Effort: Pulmonary effort is normal.   Abdominal:      Palpations: Abdomen is soft.      Tenderness: There is no abdominal tenderness. There is no guarding.     Musculoskeletal:      Cervical back: Normal range of motion and neck supple.     Skin:     General: Skin is warm and dry.      Coloration:  Skin is not pale.     Neurological:      Mental Status: She is alert.      Comments: GCS 15, Awake, Alert, Oriented x 3    Motor: KELLY, strength 4+/5 throughout    Sensation:  intact to LT X 4     Reflexes: 2+ and symmetric, no hunt's or clonus     Coordination: no drift bilateral upper extremities, finger to nose normal bilaterally.          Psychiatric:         Behavior: Behavior normal.       Neurological Exam  Mental Status  Alert.    Cranial Nerves  CN III, IV, VI: Extraocular movements intact bilaterally. Pupils equal round and reactive to light bilaterally.  GCS 15, Awake, Alert, Oriented x 3    Motor: KELLY, strength 4+/5 throughout    Sensation:  intact to LT X 4     Reflexes: 2+ and symmetric, no hunt's or clonus     Coordination: no drift bilateral upper extremities, finger to nose normal bilaterally.       .        Administrative Statements   I have spent a total time of 30 minutes in caring for this patient on the day of the visit/encounter including Diagnostic results,  Instructions for management, Patient and family education, Risk factor reductions, Impressions, Counseling / Coordination of care, Documenting in the medical record, Reviewing / ordering tests, medicine, procedures  , Obtaining or reviewing history  , and Communicating with other healthcare professionals .                [1]   Past Medical History:  Diagnosis Date    Essential hypertension     LAST ASSESSED: 4/27/15    Malignant neoplasm of breast (HCC)     RIGHT; LAST ASSESSED: 4/27/15    Osteoporosis     LAST ASSESSED: 4/27/15    Pacemaker 04/15/2021   [2]   Past Surgical History:  Procedure Laterality Date    BLADDER SURGERY      BREAST LUMPECTOMY      LAST ASSESSED: 4/27/15    HIP SURGERY Right 04/16/2021    TONSILLECTOMY     [3]   Family History  Problem Relation Name Age of Onset    Stroke Mother          CEREBROVASCULAR ACCIDENT    Hypertension Mother      Ovarian cancer Mother      Pancreatic cancer Mother          PANCREAS  CARCINOMA    Pancreatic cancer Brother          PANCREAS CARCINOMA    Bipolar disorder Other MATERNAL RELATIVES     Substance Abuse Neg Hx      Mental illness Neg Hx     [4]   Allergies  Allergen Reactions    Penicillins Hives    Penicillins Hives    Sulfa Antibiotics Other (See Comments)     Pt doesn't remember

## 2025-07-29 PROBLEM — S01.511A LIP LACERATION: Status: RESOLVED | Noted: 2025-06-29 | Resolved: 2025-07-29

## 2025-07-30 ENCOUNTER — REMOTE DEVICE CLINIC VISIT (OUTPATIENT)
Dept: CARDIOLOGY CLINIC | Facility: CLINIC | Age: OVER 89
End: 2025-07-30
Payer: MEDICARE

## 2025-07-30 DIAGNOSIS — Z95.0 PRESENCE OF CARDIAC PACEMAKER: Primary | ICD-10-CM

## 2025-07-30 PROCEDURE — 93296 REM INTERROG EVL PM/IDS: CPT | Performed by: INTERNAL MEDICINE

## 2025-07-30 PROCEDURE — 93294 REM INTERROG EVL PM/LDLS PM: CPT | Performed by: INTERNAL MEDICINE

## 2025-08-11 ENCOUNTER — DOCUMENTATION (OUTPATIENT)
Dept: ADMINISTRATIVE | Facility: OTHER | Age: OVER 89
End: 2025-08-11